# Patient Record
Sex: FEMALE | Race: WHITE | NOT HISPANIC OR LATINO | Employment: FULL TIME | URBAN - NONMETROPOLITAN AREA
[De-identification: names, ages, dates, MRNs, and addresses within clinical notes are randomized per-mention and may not be internally consistent; named-entity substitution may affect disease eponyms.]

---

## 2017-01-01 ENCOUNTER — HOSPITAL ENCOUNTER (EMERGENCY)
Facility: HOSPITAL | Age: 22
Discharge: HOME | End: 2017-01-01
Attending: EMERGENCY MEDICINE | Admitting: EMERGENCY MEDICINE

## 2017-01-01 VITALS
OXYGEN SATURATION: 99 % | RESPIRATION RATE: 16 BRPM | SYSTOLIC BLOOD PRESSURE: 117 MMHG | WEIGHT: 167 LBS | HEIGHT: 65 IN | TEMPERATURE: 97.8 F | DIASTOLIC BLOOD PRESSURE: 77 MMHG | HEART RATE: 91 BPM | BODY MASS INDEX: 27.82 KG/M2

## 2017-01-01 DIAGNOSIS — IMO0002 UNCONTROLLED TYPE 1 DIABETES MELLITUS WITH COMPLICATION: ICD-10-CM

## 2017-01-01 DIAGNOSIS — E16.2 HYPOGLYCEMIA: Primary | ICD-10-CM

## 2017-01-01 LAB
ALBUMIN SERPL-MCNC: 5.1 G/DL (ref 3.5–5)
ALBUMIN/GLOB SERPL: 1.3 G/DL (ref 1.5–2.5)
ALP SERPL-CCNC: 186 U/L (ref 46–116)
ALT SERPL W P-5'-P-CCNC: 28 U/L (ref 10–36)
ANION GAP SERPL CALCULATED.3IONS-SCNC: 18.9 MMOL/L (ref 3.6–11.2)
AST SERPL-CCNC: 33 U/L (ref 10–30)
B-HCG UR QL: NEGATIVE
BACTERIA UR QL AUTO: ABNORMAL /HPF
BASOPHILS # BLD AUTO: 0.02 10*3/MM3 (ref 0–0.3)
BASOPHILS NFR BLD AUTO: 0.2 % (ref 0–2)
BILIRUB SERPL-MCNC: 0.5 MG/DL (ref 0.2–1.8)
BILIRUB UR QL STRIP: NEGATIVE
BUN BLD-MCNC: 15 MG/DL (ref 7–21)
BUN/CREAT SERPL: 18.8 (ref 7–25)
CALCIUM SPEC-SCNC: 10.5 MG/DL (ref 7.7–10)
CHLORIDE SERPL-SCNC: 105 MMOL/L (ref 99–112)
CLARITY UR: ABNORMAL
CO2 SERPL-SCNC: 14.1 MMOL/L (ref 24.3–31.9)
COLOR UR: YELLOW
CREAT BLD-MCNC: 0.8 MG/DL (ref 0.43–1.29)
DEPRECATED RDW RBC AUTO: 47.9 FL (ref 37–54)
EOSINOPHIL # BLD AUTO: 0 10*3/MM3 (ref 0–0.7)
EOSINOPHIL NFR BLD AUTO: 0 % (ref 0–5)
ERYTHROCYTE [DISTWIDTH] IN BLOOD BY AUTOMATED COUNT: 15.8 % (ref 11.5–14.5)
GFR SERPL CREATININE-BSD FRML MDRD: 92 ML/MIN/1.73
GLOBULIN UR ELPH-MCNC: 3.9 GM/DL
GLUCOSE BLD-MCNC: 46 MG/DL (ref 70–110)
GLUCOSE BLDC GLUCOMTR-MCNC: 172 MG/DL (ref 70–130)
GLUCOSE BLDC GLUCOMTR-MCNC: 307 MG/DL (ref 70–130)
GLUCOSE BLDC GLUCOMTR-MCNC: 33 MG/DL (ref 70–130)
GLUCOSE BLDC GLUCOMTR-MCNC: 37 MG/DL (ref 70–130)
GLUCOSE BLDC GLUCOMTR-MCNC: 38 MG/DL (ref 70–130)
GLUCOSE UR STRIP-MCNC: ABNORMAL MG/DL
HCT VFR BLD AUTO: 44.9 % (ref 37–47)
HGB BLD-MCNC: 14.2 G/DL (ref 12–16)
HGB UR QL STRIP.AUTO: ABNORMAL
HYALINE CASTS UR QL AUTO: ABNORMAL /LPF
IMM GRANULOCYTES # BLD: 0.03 10*3/MM3 (ref 0–0.03)
IMM GRANULOCYTES NFR BLD: 0.4 % (ref 0–0.5)
KETONES UR QL STRIP: ABNORMAL
LEUKOCYTE ESTERASE UR QL STRIP.AUTO: ABNORMAL
LYMPHOCYTES # BLD AUTO: 1.62 10*3/MM3 (ref 1–3)
LYMPHOCYTES NFR BLD AUTO: 19.6 % (ref 21–51)
MCH RBC QN AUTO: 26.6 PG (ref 27–33)
MCHC RBC AUTO-ENTMCNC: 31.6 G/DL (ref 33–37)
MCV RBC AUTO: 84.1 FL (ref 80–94)
MONOCYTES # BLD AUTO: 0.9 10*3/MM3 (ref 0.1–0.9)
MONOCYTES NFR BLD AUTO: 10.9 % (ref 0–10)
NEUTROPHILS # BLD AUTO: 5.7 10*3/MM3 (ref 1.4–6.5)
NEUTROPHILS NFR BLD AUTO: 68.9 % (ref 30–70)
NITRITE UR QL STRIP: NEGATIVE
OSMOLALITY SERPL CALC.SUM OF ELEC: 273.6 MOSM/KG (ref 273–305)
PH UR STRIP.AUTO: <=5 [PH] (ref 5–8)
PLATELET # BLD AUTO: 327 10*3/MM3 (ref 130–400)
PMV BLD AUTO: 12.6 FL (ref 6–10)
POTASSIUM BLD-SCNC: 4 MMOL/L (ref 3.5–5.3)
PROT SERPL-MCNC: 9 G/DL (ref 6–8)
PROT UR QL STRIP: ABNORMAL
RBC # BLD AUTO: 5.34 10*6/MM3 (ref 4.2–5.4)
RBC # UR: ABNORMAL /HPF
REF LAB TEST METHOD: ABNORMAL
SODIUM BLD-SCNC: 138 MMOL/L (ref 135–153)
SP GR UR STRIP: 1.02 (ref 1–1.03)
SQUAMOUS #/AREA URNS HPF: ABNORMAL /HPF
UROBILINOGEN UR QL STRIP: ABNORMAL
WBC NRBC COR # BLD: 8.27 10*3/MM3 (ref 4.5–12.5)
WBC UR QL AUTO: ABNORMAL /HPF

## 2017-01-01 PROCEDURE — 25010000002 ONDANSETRON PER 1 MG: Performed by: PHYSICIAN ASSISTANT

## 2017-01-01 PROCEDURE — 85025 COMPLETE CBC W/AUTO DIFF WBC: CPT | Performed by: PHYSICIAN ASSISTANT

## 2017-01-01 PROCEDURE — 96375 TX/PRO/DX INJ NEW DRUG ADDON: CPT

## 2017-01-01 PROCEDURE — 80053 COMPREHEN METABOLIC PANEL: CPT | Performed by: PHYSICIAN ASSISTANT

## 2017-01-01 PROCEDURE — 99283 EMERGENCY DEPT VISIT LOW MDM: CPT

## 2017-01-01 PROCEDURE — 96361 HYDRATE IV INFUSION ADD-ON: CPT

## 2017-01-01 PROCEDURE — 82962 GLUCOSE BLOOD TEST: CPT

## 2017-01-01 PROCEDURE — 81025 URINE PREGNANCY TEST: CPT | Performed by: PHYSICIAN ASSISTANT

## 2017-01-01 PROCEDURE — 81001 URINALYSIS AUTO W/SCOPE: CPT | Performed by: PHYSICIAN ASSISTANT

## 2017-01-01 PROCEDURE — 87086 URINE CULTURE/COLONY COUNT: CPT | Performed by: PHYSICIAN ASSISTANT

## 2017-01-01 PROCEDURE — 96374 THER/PROPH/DIAG INJ IV PUSH: CPT

## 2017-01-01 RX ORDER — DEXTROSE MONOHYDRATE 25 G/50ML
25 INJECTION, SOLUTION INTRAVENOUS ONCE
Status: COMPLETED | OUTPATIENT
Start: 2017-01-01 | End: 2017-01-01

## 2017-01-01 RX ORDER — ONDANSETRON 2 MG/ML
4 INJECTION INTRAMUSCULAR; INTRAVENOUS ONCE
Status: COMPLETED | OUTPATIENT
Start: 2017-01-01 | End: 2017-01-01

## 2017-01-01 RX ORDER — SODIUM CHLORIDE 0.9 % (FLUSH) 0.9 %
10 SYRINGE (ML) INJECTION AS NEEDED
Status: DISCONTINUED | OUTPATIENT
Start: 2017-01-01 | End: 2017-01-02 | Stop reason: HOSPADM

## 2017-01-01 RX ORDER — DEXTROSE MONOHYDRATE 25 G/50ML
INJECTION, SOLUTION INTRAVENOUS
Status: DISCONTINUED
Start: 2017-01-01 | End: 2017-01-02 | Stop reason: HOSPADM

## 2017-01-01 RX ORDER — INSULIN GLARGINE 100 [IU]/ML
INJECTION, SOLUTION SUBCUTANEOUS 2 TIMES DAILY
COMMUNITY
End: 2017-08-10 | Stop reason: DRUGHIGH

## 2017-01-01 RX ADMIN — SODIUM CHLORIDE 1000 ML: 9 INJECTION, SOLUTION INTRAVENOUS at 19:02

## 2017-01-01 RX ADMIN — ONDANSETRON 4 MG: 2 INJECTION, SOLUTION INTRAMUSCULAR; INTRAVENOUS at 19:03

## 2017-01-01 RX ADMIN — DEXTROSE MONOHYDRATE 25 G: 25 INJECTION, SOLUTION INTRAVENOUS at 19:31

## 2017-01-02 NOTE — ED PROVIDER NOTES
Subjective   Patient is a 21 y.o. female presenting with vomiting.   History provided by:  Patient  Vomiting   The primary symptoms include nausea and vomiting. Primary symptoms do not include fever, abdominal pain or dysuria. The illness began today. The onset was sudden. The problem has not changed since onset.  Associated medical issues comments: type I Diabetic / HX of Diabetic ketoacidosis. .       Review of Systems   Constitutional: Negative.  Negative for fever.   HENT: Negative.    Respiratory: Negative.    Cardiovascular: Negative.  Negative for chest pain.   Gastrointestinal: Positive for nausea and vomiting. Negative for abdominal pain.   Endocrine: Negative.    Genitourinary: Negative.  Negative for dysuria.   Skin: Negative.    Neurological: Negative.    Psychiatric/Behavioral: Negative.    All other systems reviewed and are negative.      Past Medical History   Diagnosis Date   • Diabetes mellitus 2000     Type 1   • Disease of thyroid gland      hypothyroidism   • Gestational hypertension    • Hypertension 2016     With pregnancy   • Hypertension      since 15yo   • Kidney failure 2016     R/T diabetes   • Polyhydramnios        No Known Allergies    Past Surgical History   Procedure Laterality Date   • Finger/thumb closed reduction Left 2004   • Finger/thumb arthroplasty Left      age 9 yr   • Finger/thumb closed reduction         Family History   Problem Relation Age of Onset   • Hypertension Father        Social History     Social History   • Marital status: Single     Spouse name: N/A   • Number of children: N/A   • Years of education: N/A     Social History Main Topics   • Smoking status: Never Smoker   • Smokeless tobacco: Never Used   • Alcohol use No   • Drug use: No   • Sexual activity: Yes     Partners: Male     Other Topics Concern   • None     Social History Narrative           Objective   Physical Exam   Constitutional: She is oriented to person, place, and time. She appears well-developed  and well-nourished. No distress.   HENT:   Head: Normocephalic and atraumatic.   Right Ear: External ear normal.   Left Ear: External ear normal.   Nose: Nose normal.   Eyes: Conjunctivae and EOM are normal. Pupils are equal, round, and reactive to light.   Neck: Normal range of motion. Neck supple. No JVD present. No tracheal deviation present.   Cardiovascular: Normal rate, regular rhythm and normal heart sounds.    No murmur heard.  Pulmonary/Chest: Effort normal and breath sounds normal. No respiratory distress. She has no wheezes.   Abdominal: Soft. Bowel sounds are normal. There is no tenderness.   Musculoskeletal: Normal range of motion. She exhibits no edema or deformity.   Neurological: She is alert and oriented to person, place, and time. No cranial nerve deficit.   Skin: Skin is warm and dry. No rash noted. She is not diaphoretic. No erythema. No pallor.   Psychiatric: She has a normal mood and affect. Her behavior is normal. Thought content normal.   Nursing note and vitals reviewed.      Procedures         ED Course  ED Course   Comment By Time   First finger stick reported at 38  Second finger stick reported at 33.  Pt has consumed 8oz of OJ.   JAVIER Fischer 01/01 1931   Glucose 172 at last reading.  JAVIER Fischer 01/01 2011   Last finger stick was over 300.  Pt has insulin in her possession and will tx w/ sliding scale.  JAVIER Fischer 01/02 0316                  MDM  Number of Diagnoses or Management Options  Hypoglycemia: established and improving  Uncontrolled type 1 diabetes mellitus with complication: established and improving     Amount and/or Complexity of Data Reviewed  Clinical lab tests: ordered and reviewed    Risk of Complications, Morbidity, and/or Mortality  Presenting problems: moderate  Diagnostic procedures: low  Management options: low    Patient Progress  Patient progress: stable      Final diagnoses:   Hypoglycemia   Uncontrolled type 1 diabetes mellitus with complication             JAVIER Fischer  01/02/17 0314

## 2017-01-04 LAB
BACTERIA SPEC AEROBE CULT: NORMAL
GLUCOSE BLDC GLUCOMTR-MCNC: 38 MG/DL (ref 70–130)

## 2017-07-17 ENCOUNTER — TRANSCRIBE ORDERS (OUTPATIENT)
Dept: WOMENS IMAGING | Facility: HOSPITAL | Age: 22
End: 2017-07-17

## 2017-07-17 DIAGNOSIS — IMO0001 IDDM (INSULIN DEPENDENT DIABETES MELLITUS): Primary | ICD-10-CM

## 2017-07-17 LAB
EXTERNAL ABO GROUPING: NORMAL
EXTERNAL ANTIBODY SCREEN: NEGATIVE
EXTERNAL CHLAMYDIA SCREEN: NORMAL
EXTERNAL GONORRHEA SCREEN: NORMAL
EXTERNAL HEMOGLOBIN: 12.9 G/DL
EXTERNAL HEPATITIS B SURFACE ANTIGEN: NEGATIVE
EXTERNAL RH FACTOR: NEGATIVE
EXTERNAL RUBELLA QUALITATIVE: NORMAL
EXTERNAL SYPHILIS RPR SCREEN: NORMAL
HIV1 P24 AG SERPL QL IA: NORMAL

## 2017-08-10 ENCOUNTER — HOSPITAL ENCOUNTER (OUTPATIENT)
Dept: WOMENS IMAGING | Facility: HOSPITAL | Age: 22
Discharge: HOME | End: 2017-08-10
Admitting: OBSTETRICS & GYNECOLOGY

## 2017-08-10 ENCOUNTER — OFFICE VISIT (OUTPATIENT)
Dept: OBSTETRICS AND GYNECOLOGY | Facility: HOSPITAL | Age: 22
End: 2017-08-10

## 2017-08-10 VITALS
BODY MASS INDEX: 31.22 KG/M2 | WEIGHT: 187.4 LBS | SYSTOLIC BLOOD PRESSURE: 132 MMHG | DIASTOLIC BLOOD PRESSURE: 84 MMHG | HEIGHT: 65 IN

## 2017-08-10 DIAGNOSIS — IMO0001 IDDM (INSULIN DEPENDENT DIABETES MELLITUS): ICD-10-CM

## 2017-08-10 DIAGNOSIS — O24.019 TYPE 1 DIABETES MELLITUS IN PREGNANCY, UNSPECIFIED TRIMESTER: Primary | ICD-10-CM

## 2017-08-10 DIAGNOSIS — Z71.89 DIABETES EDUCATION, ENCOUNTER FOR: ICD-10-CM

## 2017-08-10 LAB
BILIRUB BLD-MCNC: NEGATIVE MG/DL
CLARITY, POC: CLEAR
COLOR UR: YELLOW
GLUCOSE UR STRIP-MCNC: ABNORMAL MG/DL
KETONES UR QL: ABNORMAL
LEUKOCYTE EST, POC: NEGATIVE
NITRITE UR-MCNC: NEGATIVE MG/ML
PH UR: 6 [PH] (ref 5–8)
PROT UR STRIP-MCNC: NEGATIVE MG/DL
RBC # UR STRIP: NEGATIVE /UL
SP GR UR: 1.02 (ref 1–1.03)
UROBILINOGEN UR QL: NORMAL

## 2017-08-10 PROCEDURE — 99241 PR OFFICE CONSULTATION NEW/ESTAB PATIENT 15 MIN: CPT | Performed by: OBSTETRICS & GYNECOLOGY

## 2017-08-10 PROCEDURE — 76811 OB US DETAILED SNGL FETUS: CPT

## 2017-08-10 PROCEDURE — 93325 DOPPLER ECHO COLOR FLOW MAPG: CPT

## 2017-08-10 PROCEDURE — 76825 ECHO EXAM OF FETAL HEART: CPT

## 2017-08-10 PROCEDURE — 76811 OB US DETAILED SNGL FETUS: CPT | Performed by: OBSTETRICS & GYNECOLOGY

## 2017-08-10 PROCEDURE — 93325 DOPPLER ECHO COLOR FLOW MAPG: CPT | Performed by: OBSTETRICS & GYNECOLOGY

## 2017-08-10 PROCEDURE — 76825 ECHO EXAM OF FETAL HEART: CPT | Performed by: OBSTETRICS & GYNECOLOGY

## 2017-08-10 RX ORDER — ASPIRIN 81 MG/1
81 TABLET ORAL DAILY
COMMUNITY
End: 2017-12-19 | Stop reason: HOSPADM

## 2017-08-10 RX ORDER — INSULIN GLARGINE 100 [IU]/ML
52 INJECTION, SOLUTION SUBCUTANEOUS DAILY
COMMUNITY
End: 2017-09-29 | Stop reason: CLARIF

## 2017-08-10 NOTE — PROGRESS NOTES
Denies problems. [facility] has not been offered genetic screening. Her PCP (APRN) manages T1DM when she is not pregnant but has not seen her as scheduled and only provides glucose values every 6 months. [facility] no one has been following glucose values during pregnancy but Dr. Rodriguez wants [facility] to manage. Admits only does fasting glucose values, which have been 90's-440's. Has not done a 24 hour urine in this pregnancy. Takes Lantus each morning and Humalog 1u/7gm carb ratio with each meal and snack. Denies any recent diabetes education. Had GHTN in previous pregnancy. Denies any history of CHTN or renal failure; both were previously documented in patient hx.  Patient was admitted at 33w 6d in the third epidsode of DKA during that pregnancy and IUFD was dx; was transferred to [facility] and delivered.

## 2017-08-10 NOTE — PROGRESS NOTES
Documentation of the ultrasound findings, images, and interpretations as well as consultation notes will be available in the patient's ViewPoint report located in the chart review imaging tab in [facility].

## 2017-08-14 ENCOUNTER — TELEPHONE (OUTPATIENT)
Dept: INTERNAL MEDICINE | Facility: CLINIC | Age: 22
End: 2017-08-14

## 2017-08-17 ENCOUNTER — TELEPHONE (OUTPATIENT)
Dept: WOMENS IMAGING | Facility: HOSPITAL | Age: 22
End: 2017-08-17

## 2017-08-17 NOTE — TELEPHONE ENCOUNTER
Patient did not have preeclampsia panel drawn as ordered on 8/10/2017. Also has not yet scheduled follow-up appointment here. Left voice message for patient to call [facility].

## 2017-08-22 ENCOUNTER — OFFICE VISIT (OUTPATIENT)
Dept: ENDOCRINOLOGY | Facility: CLINIC | Age: 22
End: 2017-08-22

## 2017-08-22 VITALS
WEIGHT: 185 LBS | OXYGEN SATURATION: 100 % | BODY MASS INDEX: 30.82 KG/M2 | DIASTOLIC BLOOD PRESSURE: 70 MMHG | HEIGHT: 65 IN | SYSTOLIC BLOOD PRESSURE: 124 MMHG | HEART RATE: 93 BPM

## 2017-08-22 DIAGNOSIS — I10 ESSENTIAL HYPERTENSION: ICD-10-CM

## 2017-08-22 DIAGNOSIS — IMO0001 IDDM (INSULIN DEPENDENT DIABETES MELLITUS): Primary | ICD-10-CM

## 2017-08-22 LAB
ALBUMIN SERPL-MCNC: 3.8 G/DL (ref 3.2–4.8)
ALBUMIN/GLOB SERPL: 1.4 G/DL (ref 1.5–2.5)
ALP SERPL-CCNC: 91 U/L (ref 25–100)
ALT SERPL W P-5'-P-CCNC: 10 U/L (ref 7–40)
ANION GAP SERPL CALCULATED.3IONS-SCNC: 14 MMOL/L (ref 3–11)
AST SERPL-CCNC: 13 U/L (ref 0–33)
BILIRUB SERPL-MCNC: 0.3 MG/DL (ref 0.3–1.2)
BUN BLD-MCNC: 14 MG/DL (ref 9–23)
BUN/CREAT SERPL: 23.3 (ref 7–25)
CALCIUM SPEC-SCNC: 9.2 MG/DL (ref 8.7–10.4)
CHLORIDE SERPL-SCNC: 105 MMOL/L (ref 99–109)
CO2 SERPL-SCNC: 17 MMOL/L (ref 20–31)
CREAT BLD-MCNC: 0.6 MG/DL (ref 0.6–1.3)
GFR SERPL CREATININE-BSD FRML MDRD: 127 ML/MIN/1.73
GLOBULIN UR ELPH-MCNC: 2.8 GM/DL
GLUCOSE BLD-MCNC: 215 MG/DL (ref 70–100)
GLUCOSE BLDC GLUCOMTR-MCNC: 274 MG/DL (ref 70–130)
HBA1C MFR BLD: 9.6 %
POTASSIUM BLD-SCNC: 4 MMOL/L (ref 3.5–5.5)
PROT SERPL-MCNC: 6.6 G/DL (ref 5.7–8.2)
SODIUM BLD-SCNC: 136 MMOL/L (ref 132–146)
T4 FREE SERPL-MCNC: 1.2 NG/DL (ref 0.89–1.76)
TSH SERPL DL<=0.05 MIU/L-ACNC: 3.64 MIU/ML (ref 0.35–5.35)

## 2017-08-22 PROCEDURE — 82043 UR ALBUMIN QUANTITATIVE: CPT | Performed by: INTERNAL MEDICINE

## 2017-08-22 PROCEDURE — 84439 ASSAY OF FREE THYROXINE: CPT | Performed by: INTERNAL MEDICINE

## 2017-08-22 PROCEDURE — 99244 OFF/OP CNSLTJ NEW/EST MOD 40: CPT | Performed by: INTERNAL MEDICINE

## 2017-08-22 PROCEDURE — 82947 ASSAY GLUCOSE BLOOD QUANT: CPT | Performed by: INTERNAL MEDICINE

## 2017-08-22 PROCEDURE — 80053 COMPREHEN METABOLIC PANEL: CPT | Performed by: INTERNAL MEDICINE

## 2017-08-22 PROCEDURE — 84443 ASSAY THYROID STIM HORMONE: CPT | Performed by: INTERNAL MEDICINE

## 2017-08-22 PROCEDURE — 83036 HEMOGLOBIN GLYCOSYLATED A1C: CPT | Performed by: INTERNAL MEDICINE

## 2017-08-22 PROCEDURE — 82570 ASSAY OF URINE CREATININE: CPT | Performed by: INTERNAL MEDICINE

## 2017-08-22 RX ORDER — INSULIN LISPRO 100 [IU]/ML
20 INJECTION, SOLUTION INTRAVENOUS; SUBCUTANEOUS
Qty: 10 PEN | Refills: 1 | Status: SHIPPED | OUTPATIENT
Start: 2017-08-22 | End: 2017-09-29 | Stop reason: CLARIF

## 2017-08-22 RX ORDER — LANCETS 33 GAUGE
EACH MISCELLANEOUS
Qty: 200 EACH | Refills: 5 | Status: ON HOLD | OUTPATIENT
Start: 2017-08-22 | End: 2017-10-12

## 2017-08-22 RX ORDER — CHLORPHENIR/PHENYLEPH/ASPIRIN 2-7.8-325
1 TABLET, EFFERVESCENT ORAL DAILY
Qty: 50 STRIP | Refills: 3 | Status: ON HOLD | OUTPATIENT
Start: 2017-08-22 | End: 2017-10-12

## 2017-08-22 RX ORDER — BLOOD-GLUCOSE METER
1 KIT MISCELLANEOUS DAILY
Qty: 1 EACH | Refills: 0 | Status: ON HOLD | OUTPATIENT
Start: 2017-08-22 | End: 2017-10-12

## 2017-08-22 NOTE — ASSESSMENT & PLAN NOTE
Blood sugar and 90 day average sugar reviewed  Results for orders placed or performed in visit on 08/22/17   POC Glycosylated Hemoglobin (Hb A1C)   Result Value Ref Range    Hemoglobin A1C 9.6 %   POC Glucose Fingerstick   Result Value Ref Range    Glucose 274 (A) 70 - 130 mg/dL     Goal average sugar 6.5% or less discussed  She will start testing sugars ac and hs, some pp sugars  Consider glucose sensor  Update eye exam   Check ur alb   F/u 4-6 weeks with weekly email   Adjustment based on blood sugars  No changes were made today as I am hopeful that with testing, adjustment and changes in diet sugars will improve   rx for pens sent as patient is working a lot, has no where to store insulin   Also having problems with manual dexterity due to the pregnancy and needs pen for management and ability to continue to self administer insulin at this time

## 2017-08-22 NOTE — PROGRESS NOTES
Emily Bagley 22 y.o.  [facility]:Establish Care (New patient being seen at the request of Dr Whitaker for a consultation regarding Type I Diabetes with pregnancy, GAVINO 1-)    [place]: Establish Care (New patient being seen at the request of Dr Whitaker for a consultation regarding Type I Diabetes with pregnancy, GAVINO 1-)    Blood sugar and 90 day average sugar reviewed  Results for orders placed or performed in visit on 08/22/17   POC Glycosylated Hemoglobin (Hb A1C)   Result Value Ref Range    Hemoglobin A1C 9.6 %   POC Glucose Fingerstick   Result Value Ref Range    Glucose 274 (A) 70 - 130 mg/dL     Average sugar is 225   Last check 10/16 was 8.5%  Diagnosed diabetes since age 5 and 1/2  Is currently 20 weeks pregnant  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)  lantus 52 units in the morning  Also using humalog 1 unit per 7 carbs   At times insulin working and at times sugar high after meals  Has talked to Dr Bazan in past about pumps   Most recent IDDM management via PCP   Discussed goals for fasting sugar less than 100 and goal for pp sugar less than 140  Rationale for goals discussed along with risk to baby (assoc with higher sugars) including macrosomia, IUFD  Discussed carb counts recommended with meals  Advised best results with standardization of meals and sticking with consistent meals which have known carb counts   Insulin to carb ratio and sensitivity reviewed  1:7:20 with 5 u per 100 over goal (goal 100) discussed  Also discussed long term management with pump and options reviewed, information provided   bp is good     No Known Allergies    Current Outpatient Prescriptions:   •  aspirin 81 MG EC tablet, Take 81 mg by mouth Daily., Disp: , Rfl:   •  insulin glargine (LANTUS) 100 UNIT/ML injection, Inject 52 Units under the skin Daily. Takes each morning, Disp: , Rfl:   •  insulin lispro (HumaLOG) 100 UNIT/ML injection, Inject 0-14 Units under the skin 4 (Four) Times a Day With Meals & at Bedtime. (Patient taking  differently: Inject 0-14 Units under the skin 4 (Four) Times a Day With Meals & at Bedtime. 1u/7gm carb ratio; takes with each meal and snack; also does correction scale - Inject 7-20 units TID with meals), Disp: 2 mL, Rfl: 12  •  Syringe, Disposable, 1 ML misc, 1 mL 4 (Four) Times a Day Before Meals & at Bedtime., Disp: 100 each, Rfl: 1  Patient Active Problem List    Diagnosis   • IDDM (insulin dependent diabetes mellitus) [E11.9, Z79.4]   • Endometritis [N71.9]   • PIH (pregnancy induced hypertension) [O13.9]   • DKA (diabetic ketoacidoses) [E13.10]   • Intrauterine fetal death in pregnancy, s/p induction, spontaneous vaginal delivery 10/9/16, Laborist Dr Juan [O36.4XX0]   • Lactic acidosis [E87.2]   • N&V (nausea and vomiting) [R11.2]   • Hypothyroid [E03.9]   • Pre-existing type 1 diabetes mellitus during pregnancy in third trimester [O24.013]   • Polyhydramnios [O40.9XX0]   • Chronic hypertension in pregnancy [O10.919]   • Hypertension [I10]     Overview Note:     With pregnancy     • Diabetes education, encounter for [Z71.89]     Overview Note:     Type 1       Review of Systems   Constitutional: Positive for fatigue and unexpected weight change. Negative for activity change, appetite change, chills, diaphoresis and fever.   HENT: Negative for congestion, dental problem, drooling, ear discharge, ear pain, facial swelling, hearing loss, mouth sores, nosebleeds, postnasal drip, rhinorrhea, sinus pressure, sneezing, sore throat, tinnitus, trouble swallowing and voice change.    Eyes: Negative for photophobia, pain, discharge, redness, itching and visual disturbance.   Respiratory: Negative for apnea, cough, choking, chest tightness, shortness of breath, wheezing and stridor.    Cardiovascular: Positive for palpitations. Negative for chest pain and leg swelling.   Gastrointestinal: Positive for nausea. Negative for abdominal distention, abdominal pain, anal bleeding, blood in stool, constipation, diarrhea,  "rectal pain and vomiting.   Endocrine: Positive for polydipsia. Negative for cold intolerance, heat intolerance, polyphagia and polyuria.   Genitourinary: Negative for decreased urine volume, difficulty urinating, dysuria, enuresis, flank pain, frequency, genital sores, hematuria and urgency.        Pregnant LMP 4/4/17   Musculoskeletal: Negative for arthralgias, back pain, gait problem, joint swelling, myalgias, neck pain and neck stiffness.   Skin: Negative for color change, pallor, rash and wound.   Allergic/Immunologic: Negative for environmental allergies, food allergies and immunocompromised state.   Neurological: Negative for dizziness, tremors, seizures, syncope, facial asymmetry, speech difficulty, weakness, light-headedness, numbness and headaches.   Hematological: Negative for adenopathy. Does not bruise/bleed easily.   Psychiatric/Behavioral: Negative for agitation, behavioral problems, confusion, decreased concentration, dysphoric mood, hallucinations, self-injury, sleep disturbance and suicidal ideas. The patient is not nervous/anxious and is not hyperactive.      Social History     Social History   • Marital status: Single     Spouse name: N/A   • Number of children: N/A   • Years of education: N/A     Occupational History   • Not on file.     Social History Main Topics   • Smoking status: Never Smoker   • Smokeless tobacco: Never Used   • Alcohol use No   • Drug use: No   • Sexual activity: Yes     Partners: Male     Other Topics Concern   • Not on file     Social History Narrative     Family History   Problem Relation Age of Onset   • Hypertension Father      /70  Pulse 93  Ht 65\" (165.1 cm)  Wt 185 lb (83.9 kg)  LMP 04/04/2017  SpO2 100%  BMI 30.79 kg/m2  Physical Exam   Constitutional: She is oriented to person, place, and time. She appears well-developed and well-nourished.   HENT:   Head: Normocephalic and atraumatic.   Nose: Nose normal.   Mouth/Throat: Oropharynx is clear and " moist.   Eyes: Conjunctivae, EOM and lids are normal. Pupils are equal, round, and reactive to light.   Neck: Trachea normal and normal range of motion. Neck supple. Carotid bruit is not present. No tracheal deviation present. No thyroid mass and no thyromegaly present.   Cardiovascular: Normal rate, regular rhythm, normal heart sounds and intact distal pulses.  Exam reveals no gallop and no friction rub.    No murmur heard.  Pulmonary/Chest: Effort normal and breath sounds normal. No respiratory distress. She has no wheezes.   Musculoskeletal: Normal range of motion. She exhibits no edema or deformity.    Emily had a diabetic foot exam performed today.   During the foot exam she had a monofilament test performed.    Neurological Sensory Findings - Unaltered hot/cold right ankle/foot discrimination and unaltered hot/cold left ankle/foot discrimination. Unaltered sharp/dull right ankle/foot discrimination and unaltered sharp/dull left ankle/foot discrimination. No right ankle/foot altered proprioception and no left ankle/foot altered proprioception    Vascular Status -  Her exam exhibits right foot vasculature normal. Her exam exhibits no right foot edema. Her exam exhibits left foot vasculature normal. Her exam exhibits no left foot edema.   Skin Integrity  -  Her right foot skin is intact.     Emily 's left foot skin is intact. .  Lymphadenopathy:     She has no cervical adenopathy.   Neurological: She is alert and oriented to person, place, and time. She has normal reflexes. She displays normal reflexes. No cranial nerve deficit.   Skin: Skin is warm and dry. No rash noted. No cyanosis or erythema. Nails show no clubbing.   Psychiatric: She has a normal mood and affect. Her speech is normal and behavior is normal. Judgment and thought content normal. Cognition and memory are normal.   Nursing note and vitals reviewed.    Results for orders placed or performed in visit on 08/10/17   POC Urinalysis Dipstick    Result Value Ref Range    Color Yellow Yellow, Straw, Dark Yellow, Seda    Clarity, UA Clear Clear    Glucose, UA 2000 mg/dL (A) Negative, 1000 mg/dL (3+) mg/dL    Bilirubin Negative Negative    Ketones, UA 15 mg/dL (A) Negative    Specific Gravity  1.020 1.005 - 1.030    Blood, UA Negative Negative    pH, Urine 6.0 5.0 - 8.0    Protein, POC Negative Negative mg/dL    Urobilinogen, UA Normal Normal    Leukocytes Negative Negative    Nitrite, UA Negative Negative     Emily was seen today for establish care.    Diagnoses and all orders for this visit:    IDDM (insulin dependent diabetes mellitus)  -     POC Glycosylated Hemoglobin (Hb A1C)  -     POC Glucose Fingerstick      Problem List Items Addressed This Visit        Cardiovascular and Mediastinum    Hypertension     bp is currently well controlled          Relevant Orders    TSH       Endocrine    IDDM (insulin dependent diabetes mellitus) - Primary     Blood sugar and 90 day average sugar reviewed  Results for orders placed or performed in visit on 08/22/17   POC Glycosylated Hemoglobin (Hb A1C)   Result Value Ref Range    Hemoglobin A1C 9.6 %   POC Glucose Fingerstick   Result Value Ref Range    Glucose 274 (A) 70 - 130 mg/dL     Goal average sugar 6.5% or less discussed  She will start testing sugars ac and hs, some pp sugars  Consider glucose sensor  Update eye exam   Check ur alb   F/u 4-6 weeks with weekly email   Adjustment based on blood sugars  No changes were made today as I am hopeful that with testing, adjustment and changes in diet sugars will improve   rx for pens sent as patient is working a lot, has no where to store insulin   Also having problems with manual dexterity due to the pregnancy and needs pen for management and ability to continue to self administer insulin at this time         Relevant Medications    glucagon (GLUCAGEN) 1 MG injection    Insulin Glargine (LANTUS SOLOSTAR) 100 UNIT/ML injection pen    HUMALOG KWIKPEN 100 UNIT/ML  solution pen-injector    Other Relevant Orders    POC Glycosylated Hemoglobin (Hb A1C) (Completed)    POC Glucose Fingerstick (Completed)    Microalbumin / Creatinine Urine Ratio    T4, Free    Comprehensive Metabolic Panel        Return in about 4 weeks (around 9/19/2017) for Recheck 30 min .    Crystal Reynolds MA  Signed Ne Kerr MD

## 2017-08-23 LAB
CREAT 24H UR-MCNC: 61.8 MG/DL
MICROALB/CRT. RATIO UR: 12.9 MG/G CREAT (ref 0–30)
MICROALBUMIN UR-MCNC: 8 UG/ML

## 2017-09-22 ENCOUNTER — TELEPHONE (OUTPATIENT)
Dept: INTERNAL MEDICINE | Facility: CLINIC | Age: 22
End: 2017-09-22

## 2017-09-22 NOTE — TELEPHONE ENCOUNTER
I called [facility] and spoke with John.  He states they did not receive the prescriptions that were eprescribed on 8-22-17  All prescriptions were given to him verbally.  I left a message for pt that all prescriptions were called to John and for her to call here if any questions

## 2017-09-22 NOTE — TELEPHONE ENCOUNTER
PATIENT TRY TO  RX FOR THE FOLLOWING RX'S FROM  [facility] AND WOULD NOT LET HER HAVE THEM.  CAN YOU PLEASE ADVISE   [facility]  [facility]    [phone] [facility]

## 2017-09-22 NOTE — TELEPHONE ENCOUNTER
PATIENTS HUMLOG MEDICATION REQUIRES PRIOR AUTH. PHARMACY NEEDS A PRIOR AUTH FOR THIS MEDICATION. PATIENTS INSURANCE IS NOT COVERING LANTUS SOLOSTAR BUT THEY WILL COVER BASAGLAR. PHARMACY NEEDS TO KNOW WHAT DR. HECK WANTS TO DO ABOUT THESE MEDICATIONS.

## 2017-09-25 NOTE — TELEPHONE ENCOUNTER
rx sent for Levemir (to replace Lantus) and novolog (to replace humalog) due to insurance requiring a PA on Lantus and Humalog

## 2017-09-29 ENCOUNTER — OFFICE VISIT (OUTPATIENT)
Dept: ENDOCRINOLOGY | Facility: CLINIC | Age: 22
End: 2017-09-29

## 2017-09-29 VITALS
DIASTOLIC BLOOD PRESSURE: 64 MMHG | SYSTOLIC BLOOD PRESSURE: 112 MMHG | BODY MASS INDEX: 31.02 KG/M2 | HEIGHT: 66 IN | OXYGEN SATURATION: 99 % | WEIGHT: 193 LBS | HEART RATE: 98 BPM

## 2017-09-29 DIAGNOSIS — IMO0001 IDDM (INSULIN DEPENDENT DIABETES MELLITUS): Primary | ICD-10-CM

## 2017-09-29 LAB — HBA1C MFR BLD: 10 %

## 2017-09-29 PROCEDURE — 99213 OFFICE O/P EST LOW 20 MIN: CPT | Performed by: INTERNAL MEDICINE

## 2017-09-29 PROCEDURE — 82947 ASSAY GLUCOSE BLOOD QUANT: CPT | Performed by: INTERNAL MEDICINE

## 2017-09-29 PROCEDURE — 83036 HEMOGLOBIN GLYCOSYLATED A1C: CPT | Performed by: INTERNAL MEDICINE

## 2017-10-02 LAB — GLUCOSE BLDC GLUCOMTR-MCNC: 232 MG/DL (ref 70–130)

## 2017-10-03 RX ORDER — SYRINGE AND NEEDLE,INSULIN,1ML 28GX1/2"
SYRINGE, EMPTY DISPOSABLE MISCELLANEOUS
Refills: 1 | OUTPATIENT
Start: 2017-10-03

## 2017-10-11 ENCOUNTER — HOSPITAL ENCOUNTER (EMERGENCY)
Facility: HOSPITAL | Age: 22
Discharge: HOME | End: 2017-10-11
Attending: EMERGENCY MEDICINE | Admitting: EMERGENCY MEDICINE

## 2017-10-11 ENCOUNTER — HOSPITAL ENCOUNTER (OUTPATIENT)
Facility: HOSPITAL | Age: 22
Setting detail: OBSERVATION
Discharge: HOME | End: 2017-10-12
Attending: OBSTETRICS & GYNECOLOGY | Admitting: OBSTETRICS & GYNECOLOGY

## 2017-10-11 VITALS
OXYGEN SATURATION: 100 % | TEMPERATURE: 97.9 F | WEIGHT: 190 LBS | SYSTOLIC BLOOD PRESSURE: 119 MMHG | HEART RATE: 105 BPM | DIASTOLIC BLOOD PRESSURE: 79 MMHG | HEIGHT: 66 IN | BODY MASS INDEX: 30.53 KG/M2 | RESPIRATION RATE: 18 BRPM

## 2017-10-11 DIAGNOSIS — IMO0001 IDDM (INSULIN DEPENDENT DIABETES MELLITUS): Primary | ICD-10-CM

## 2017-10-11 PROBLEM — Z34.90 PREGNANCY: Status: ACTIVE | Noted: 2017-10-11

## 2017-10-11 LAB
ALBUMIN SERPL-MCNC: 4.5 G/DL (ref 3.5–5)
ALBUMIN/GLOB SERPL: 1.2 G/DL (ref 1.5–2.5)
ALP SERPL-CCNC: 95 U/L (ref 35–104)
ALT SERPL W P-5'-P-CCNC: 20 U/L (ref 10–36)
ANION GAP SERPL CALCULATED.3IONS-SCNC: 17.7 MMOL/L (ref 3.6–11.2)
AST SERPL-CCNC: 29 U/L (ref 10–30)
BASOPHILS # BLD AUTO: 0.03 10*3/MM3 (ref 0–0.3)
BASOPHILS NFR BLD AUTO: 0.3 % (ref 0–2)
BILIRUB SERPL-MCNC: 0.4 MG/DL (ref 0.2–1.8)
BILIRUB UR QL STRIP: NEGATIVE
BUN BLD-MCNC: 13 MG/DL (ref 7–21)
BUN/CREAT SERPL: 13.8 (ref 7–25)
CALCIUM SPEC-SCNC: 9.5 MG/DL (ref 7.7–10)
CHLORIDE SERPL-SCNC: 104 MMOL/L (ref 99–112)
CLARITY UR: CLEAR
CO2 SERPL-SCNC: 10.3 MMOL/L (ref 24.3–31.9)
COLOR UR: YELLOW
CREAT BLD-MCNC: 0.94 MG/DL (ref 0.43–1.29)
DEPRECATED RDW RBC AUTO: 42.5 FL (ref 37–54)
EOSINOPHIL # BLD AUTO: 0.01 10*3/MM3 (ref 0–0.7)
EOSINOPHIL NFR BLD AUTO: 0.1 % (ref 0–5)
ERYTHROCYTE [DISTWIDTH] IN BLOOD BY AUTOMATED COUNT: 13.6 % (ref 11.5–14.5)
GFR SERPL CREATININE-BSD FRML MDRD: 76 ML/MIN/1.73
GLOBULIN UR ELPH-MCNC: 3.9 GM/DL
GLUCOSE BLD-MCNC: 300 MG/DL (ref 70–110)
GLUCOSE BLDC GLUCOMTR-MCNC: 108 MG/DL (ref 70–130)
GLUCOSE BLDC GLUCOMTR-MCNC: 267 MG/DL (ref 70–130)
GLUCOSE BLDC GLUCOMTR-MCNC: 310 MG/DL (ref 70–130)
GLUCOSE BLDC GLUCOMTR-MCNC: 57 MG/DL (ref 70–130)
GLUCOSE UR STRIP-MCNC: ABNORMAL MG/DL
HCT VFR BLD AUTO: 42 % (ref 37–47)
HGB BLD-MCNC: 13.9 G/DL (ref 12–16)
HGB UR QL STRIP.AUTO: NEGATIVE
IMM GRANULOCYTES # BLD: 0.11 10*3/MM3 (ref 0–0.03)
IMM GRANULOCYTES NFR BLD: 0.9 % (ref 0–0.5)
KETONES UR QL STRIP: ABNORMAL
LEUKOCYTE ESTERASE UR QL STRIP.AUTO: NEGATIVE
LYMPHOCYTES # BLD AUTO: 1.14 10*3/MM3 (ref 1–3)
LYMPHOCYTES NFR BLD AUTO: 9.8 % (ref 21–51)
MCH RBC QN AUTO: 28.7 PG (ref 27–33)
MCHC RBC AUTO-ENTMCNC: 33.1 G/DL (ref 33–37)
MCV RBC AUTO: 86.6 FL (ref 80–94)
MONOCYTES # BLD AUTO: 0.54 10*3/MM3 (ref 0.1–0.9)
MONOCYTES NFR BLD AUTO: 4.6 % (ref 0–10)
NEUTROPHILS # BLD AUTO: 9.82 10*3/MM3 (ref 1.4–6.5)
NEUTROPHILS NFR BLD AUTO: 84.3 % (ref 30–70)
NITRITE UR QL STRIP: NEGATIVE
OSMOLALITY SERPL CALC.SUM OF ELEC: 275.8 MOSM/KG (ref 273–305)
PH UR STRIP.AUTO: <=5 [PH] (ref 5–8)
PLATELET # BLD AUTO: 243 10*3/MM3 (ref 130–400)
PMV BLD AUTO: 12 FL (ref 6–10)
POTASSIUM BLD-SCNC: 4.8 MMOL/L (ref 3.5–5.3)
PROT SERPL-MCNC: 8.4 G/DL (ref 6–8)
PROT UR QL STRIP: ABNORMAL
RBC # BLD AUTO: 4.85 10*6/MM3 (ref 4.2–5.4)
SODIUM BLD-SCNC: 132 MMOL/L (ref 135–153)
SP GR UR STRIP: >1.03 (ref 1–1.03)
UROBILINOGEN UR QL STRIP: ABNORMAL
WBC NRBC COR # BLD: 11.65 10*3/MM3 (ref 4.5–12.5)

## 2017-10-11 PROCEDURE — 82962 GLUCOSE BLOOD TEST: CPT

## 2017-10-11 PROCEDURE — 99284 EMERGENCY DEPT VISIT MOD MDM: CPT

## 2017-10-11 PROCEDURE — 85025 COMPLETE CBC W/AUTO DIFF WBC: CPT | Performed by: EMERGENCY MEDICINE

## 2017-10-11 PROCEDURE — 96361 HYDRATE IV INFUSION ADD-ON: CPT

## 2017-10-11 PROCEDURE — 80053 COMPREHEN METABOLIC PANEL: CPT | Performed by: EMERGENCY MEDICINE

## 2017-10-11 PROCEDURE — 63710000001 INSULIN REGULAR HUMAN PER 5 UNITS: Performed by: EMERGENCY MEDICINE

## 2017-10-11 PROCEDURE — 63710000001 INSULIN ASPART PER 5 UNITS: Performed by: OBSTETRICS & GYNECOLOGY

## 2017-10-11 PROCEDURE — 96375 TX/PRO/DX INJ NEW DRUG ADDON: CPT

## 2017-10-11 PROCEDURE — 81003 URINALYSIS AUTO W/O SCOPE: CPT | Performed by: EMERGENCY MEDICINE

## 2017-10-11 PROCEDURE — 96365 THER/PROPH/DIAG IV INF INIT: CPT

## 2017-10-11 PROCEDURE — 25010000002 PROMETHAZINE PER 50 MG: Performed by: EMERGENCY MEDICINE

## 2017-10-11 PROCEDURE — 59025 FETAL NON-STRESS TEST: CPT

## 2017-10-11 PROCEDURE — G0378 HOSPITAL OBSERVATION PER HR: HCPCS

## 2017-10-11 RX ORDER — DEXTROSE MONOHYDRATE 25 G/50ML
25 INJECTION, SOLUTION INTRAVENOUS
Status: DISCONTINUED | OUTPATIENT
Start: 2017-10-11 | End: 2017-10-12 | Stop reason: HOSPADM

## 2017-10-11 RX ORDER — SODIUM CHLORIDE 0.9 % (FLUSH) 0.9 %
10 SYRINGE (ML) INJECTION AS NEEDED
Status: DISCONTINUED | OUTPATIENT
Start: 2017-10-11 | End: 2017-10-11 | Stop reason: HOSPADM

## 2017-10-11 RX ORDER — DEXTROSE MONOHYDRATE 25 G/50ML
INJECTION, SOLUTION INTRAVENOUS
Status: DISCONTINUED
Start: 2017-10-11 | End: 2017-10-11 | Stop reason: HOSPADM

## 2017-10-11 RX ORDER — DEXTROSE MONOHYDRATE 25 G/50ML
12.5 INJECTION, SOLUTION INTRAVENOUS ONCE
Status: COMPLETED | OUTPATIENT
Start: 2017-10-11 | End: 2017-10-11

## 2017-10-11 RX ORDER — NICOTINE POLACRILEX 4 MG
15 LOZENGE BUCCAL
Status: DISCONTINUED | OUTPATIENT
Start: 2017-10-11 | End: 2017-10-12 | Stop reason: HOSPADM

## 2017-10-11 RX ORDER — SODIUM CHLORIDE 9 MG/ML
150 INJECTION, SOLUTION INTRAVENOUS CONTINUOUS
Status: DISCONTINUED | OUTPATIENT
Start: 2017-10-11 | End: 2017-10-12 | Stop reason: HOSPADM

## 2017-10-11 RX ADMIN — SODIUM CHLORIDE 1000 ML: 9 INJECTION, SOLUTION INTRAVENOUS at 15:57

## 2017-10-11 RX ADMIN — HUMAN INSULIN 5 UNITS: 100 INJECTION, SOLUTION SUBCUTANEOUS at 17:14

## 2017-10-11 RX ADMIN — PROMETHAZINE HYDROCHLORIDE 12.5 MG: 25 INJECTION INTRAMUSCULAR; INTRAVENOUS at 15:58

## 2017-10-11 RX ADMIN — SODIUM CHLORIDE 1000 ML: 9 INJECTION, SOLUTION INTRAVENOUS at 17:18

## 2017-10-11 RX ADMIN — DEXTROSE MONOHYDRATE 12.5 G: 500 INJECTION PARENTERAL at 18:41

## 2017-10-11 RX ADMIN — INSULIN ASPART 10 UNITS: 100 INJECTION, SOLUTION INTRAVENOUS; SUBCUTANEOUS at 23:43

## 2017-10-11 NOTE — ED PROVIDER NOTES
Subjective   Patient is a 22 y.o. female presenting with hyperglycemia.   Hyperglycemia   Severity:  Moderate  Onset quality:  Gradual  Progression:  Worsening  Chronicity:  Recurrent  Diabetes status:  Controlled with insulin  Context: recent illness    Relieved by:  Nothing  Ineffective treatments:  Insulin  Associated symptoms: fatigue    Associated symptoms: no abdominal pain, no chest pain, no dysuria and no fever        Review of Systems   Constitutional: Positive for appetite change and fatigue. Negative for fever.   HENT: Negative.    Respiratory: Negative.    Cardiovascular: Negative.  Negative for chest pain.   Gastrointestinal: Negative.  Negative for abdominal pain.   Endocrine: Negative.    Genitourinary: Negative.  Negative for dysuria.   Skin: Negative.    Neurological: Negative.    Psychiatric/Behavioral: Negative.    All other systems reviewed and are negative.      Past Medical History:   Diagnosis Date   • Anxiety 2014    took meds briefly   • Diabetes mellitus 2000    Type 1   • Gestational hypertension 2016   • Hypertension 2016    With pregnancy (2017-pt denies previously recorded dx of CHTN)   • Kidney failure 2016    R/T diabetes (8/10/2017- patient denies)    • Polyhydramnios        No Known Allergies    Past Surgical History:   Procedure Laterality Date   • FINGER/THUMB ARTHROPLASTY Left     age 9 yr   • FINGER/THUMB CLOSED REDUCTION Left 2004   • FINGER/THUMB CLOSED REDUCTION         Family History   Problem Relation Age of Onset   • Hypertension Father    • Cancer Maternal Grandfather    • Cancer Paternal Grandmother    • Arthritis Paternal Grandfather    • Heart attack Paternal Grandfather    • Hypertension Paternal Grandfather        Social History     Social History   • Marital status: Single     Spouse name: N/A   • Number of children: N/A   • Years of education: N/A     Social History Main Topics   • Smoking status: Never Smoker   • Smokeless tobacco: Never Used   • Alcohol use No    • Drug use: No   • Sexual activity: Yes     Partners: Male     Other Topics Concern   • None     Social History Narrative           Objective   Physical Exam   Constitutional: She is oriented to person, place, and time. She appears well-developed and well-nourished. No distress.   HENT:   Head: Normocephalic and atraumatic.   Right Ear: External ear normal.   Left Ear: External ear normal.   Nose: Nose normal.   Eyes: Conjunctivae and EOM are normal. Pupils are equal, round, and reactive to light.   Neck: Normal range of motion. Neck supple. No JVD present. No tracheal deviation present.   Cardiovascular: Normal rate, regular rhythm and normal heart sounds.    No murmur heard.  Pulmonary/Chest: Effort normal and breath sounds normal. No respiratory distress. She has no wheezes.   Abdominal: Soft. Bowel sounds are normal. There is no tenderness.   Musculoskeletal: Normal range of motion. She exhibits no edema or deformity.   Neurological: She is alert and oriented to person, place, and time. No cranial nerve deficit.   Skin: Skin is warm and dry. No rash noted. She is not diaphoretic. No erythema. No pallor.   Psychiatric: She has a normal mood and affect. Her behavior is normal. Thought content normal.   Nursing note and vitals reviewed.      Procedures         ED Course  ED Course                  MDM    Final diagnoses:   IDDM (insulin dependent diabetes mellitus)            Rolando Malik MD  10/20/17 [id number]

## 2017-10-11 NOTE — ED NOTES
Spoke to l&d, was asked if we could hold the pt. Was told there was not anyone available to monitor the pt due to stat  being performed. Lead nurse notified     Carol Michelle RN  10/11/17 2616

## 2017-10-12 VITALS
SYSTOLIC BLOOD PRESSURE: 115 MMHG | RESPIRATION RATE: 18 BRPM | HEART RATE: 85 BPM | HEIGHT: 66 IN | DIASTOLIC BLOOD PRESSURE: 71 MMHG | BODY MASS INDEX: 31.82 KG/M2 | TEMPERATURE: 97.6 F | WEIGHT: 198 LBS

## 2017-10-12 LAB
GLUCOSE BLDC GLUCOMTR-MCNC: 178 MG/DL (ref 70–130)
GLUCOSE BLDC GLUCOMTR-MCNC: 268 MG/DL (ref 70–130)

## 2017-10-12 PROCEDURE — 82962 GLUCOSE BLOOD TEST: CPT

## 2017-10-12 PROCEDURE — 63710000001 INSULIN ASPART PER 5 UNITS: Performed by: OBSTETRICS & GYNECOLOGY

## 2017-10-12 PROCEDURE — G0378 HOSPITAL OBSERVATION PER HR: HCPCS

## 2017-10-12 PROCEDURE — 59025 FETAL NON-STRESS TEST: CPT

## 2017-10-12 RX ORDER — ASPIRIN 81 MG/1
81 TABLET ORAL DAILY
Status: CANCELLED | OUTPATIENT
Start: 2017-10-12

## 2017-10-12 RX ORDER — PRENATAL VIT/IRON FUM/FOLIC AC 27MG-0.8MG
1 TABLET ORAL DAILY
Status: DISCONTINUED | OUTPATIENT
Start: 2017-10-12 | End: 2017-10-12 | Stop reason: HOSPADM

## 2017-10-12 RX ORDER — PRENATAL VIT/IRON FUM/FOLIC AC 27MG-0.8MG
1 TABLET ORAL DAILY
COMMUNITY
End: 2018-05-24

## 2017-10-12 RX ADMIN — SODIUM CHLORIDE 150 ML/HR: 9 INJECTION, SOLUTION INTRAVENOUS at 07:21

## 2017-10-12 RX ADMIN — INSULIN ASPART 8 UNITS: 100 INJECTION, SOLUTION INTRAVENOUS; SUBCUTANEOUS at 07:20

## 2017-10-12 RX ADMIN — INSULIN ASPART 3 UNITS: 100 INJECTION, SOLUTION INTRAVENOUS; SUBCUTANEOUS at 02:24

## 2017-10-12 NOTE — ED PROVIDER NOTES
Subjective   HPI Comments: 27 week GA IUP with pregestational IDDM. C/O N/V and decreased baby movement    Patient is a 22 y.o. female presenting with vomiting.   History provided by:  Patient   used: No    Vomiting   The primary symptoms include nausea and vomiting. Primary symptoms do not include fever, abdominal pain or dysuria. Primary symptoms comment: decrease baby movement. The illness began today. The onset was gradual. The problem has been gradually worsening.       Review of Systems   Constitutional: Negative.  Negative for fever.   HENT: Negative.    Respiratory: Negative.    Cardiovascular: Negative.  Negative for chest pain.   Gastrointestinal: Positive for nausea and vomiting. Negative for abdominal pain.   Endocrine: Negative.    Genitourinary: Negative.  Negative for dysuria.   Skin: Negative.    Neurological: Negative.    Psychiatric/Behavioral: Negative.    All other systems reviewed and are negative.      Past Medical History:   Diagnosis Date   • Anxiety 2014    took meds briefly   • Diabetes mellitus 2000    Type 1   • Gestational hypertension 2016   • Hypertension 2016    With pregnancy (2017-[facility] denies [facility]   • Kidney failure 2016    R/T diabetes (8/10/2017- patient denies)    • Polyhydramnios        No Known Allergies    Past Surgical History:   Procedure Laterality Date   • FINGER/THUMB ARTHROPLASTY Left     age 9 yr   • FINGER/THUMB CLOSED REDUCTION Left 2004   • FINGER/THUMB CLOSED REDUCTION         Family History   Problem Relation Age of Onset   • Hypertension Father    • Cancer Maternal Grandfather    • Cancer Paternal Grandmother    • Arthritis Paternal Grandfather    • Heart attack Paternal Grandfather    • Hypertension Paternal Grandfather        Social History     Social History   • Marital status: Single     Spouse name: N/A   • Number of children: N/A   • Years of education: N/A     Social History Main Topics   • Smoking status: Never  Smoker   • Smokeless tobacco: Never Used   • Alcohol use No   • Drug use: No   • Sexual activity: Yes     Partners: Male     Other Topics Concern   • None     Social History Narrative           Objective   Physical Exam   Constitutional: She is oriented to person, place, and time. She appears well-developed and well-nourished. No distress.   HENT:   Head: Normocephalic and atraumatic.   Right Ear: External ear normal.   Left Ear: External ear normal.   Nose: Nose normal.   Eyes: Conjunctivae and EOM are normal. Pupils are equal, round, and reactive to light.   Neck: Normal range of motion. Neck supple. No JVD present. No tracheal deviation present.   Cardiovascular: Normal rate, regular rhythm and normal heart sounds.    No murmur heard.  Pulmonary/Chest: Effort normal and breath sounds normal. No respiratory distress. She has no wheezes.   Abdominal: Soft. Bowel sounds are normal. There is no tenderness.   Gravid c/w dates. FHT's 150's   Musculoskeletal: Normal range of motion. She exhibits no edema or deformity.   Neurological: She is alert and oriented to person, place, and time. No cranial nerve deficit.   Skin: Skin is warm and dry. No rash noted. She is not diaphoretic. No erythema. No pallor.   Psychiatric: She has a normal mood and affect. Her behavior is normal. Thought content normal.   Nursing note and vitals reviewed.      Procedures        Lab Results (last 24 hours)     Procedure Component Value Units Date/Time    CBC & Differential [[id number]] Collected:  10/11/17 1552    Specimen:  Blood Updated:  10/11/17 1605    Narrative:       The following orders were created for panel order CBC & Differential.  Procedure                               Abnormality         Status                     ---------                               -----------         ------                     CBC Auto Differential[[id number]]        Abnormal            Final result                 Please view results for these tests on the  individual orders.    Comprehensive Metabolic Panel [[id number]]  (Abnormal) Collected:  10/11/17 1552    Specimen:  Blood from Arm, Right Updated:  10/11/17 1652     Glucose 300 (H) mg/dL      BUN 13 mg/dL      Creatinine 0.94 mg/dL      Sodium 132 (L) mmol/L      Potassium 4.8 mmol/L       2+ Hemolysis          Chloride 104 mmol/L      CO2 10.3 (L) mmol/L      Calcium 9.5 mg/dL      Total Protein 8.4 (H) g/dL      Albumin 4.50 g/dL      ALT (SGPT) 20 U/L      AST (SGOT) 29 U/L      Alkaline Phosphatase 95 U/L       Note New Reference Ranges        Total Bilirubin 0.4 mg/dL      eGFR Non African Amer 76 mL/min/1.73      Globulin 3.9 gm/dL      A/G Ratio 1.2 (L) g/dL      BUN/Creatinine Ratio 13.8     Anion Gap 17.7 (H) mmol/L     CBC Auto Differential [[id number]]  (Abnormal) Collected:  10/11/17 1552    Specimen:  Blood from Arm, Right Updated:  10/11/17 1605     WBC 11.65 10*3/mm3      RBC 4.85 10*6/mm3      Hemoglobin 13.9 g/dL      Hematocrit 42.0 %      MCV 86.6 fL      MCH 28.7 pg      MCHC 33.1 g/dL      RDW 13.6 %      RDW-SD 42.5 fl      MPV 12.0 (H) fL      Platelets 243 10*3/mm3      Neutrophil % 84.3 (H) %      Lymphocyte % 9.8 (L) %      Monocyte % 4.6 %      Eosinophil % 0.1 %      Basophil % 0.3 %      Immature Grans % 0.9 (H) %      Neutrophils, Absolute 9.82 (H) 10*3/mm3      Lymphocytes, Absolute 1.14 10*3/mm3      Monocytes, Absolute 0.54 10*3/mm3      Eosinophils, Absolute 0.01 10*3/mm3      Basophils, Absolute 0.03 10*3/mm3      Immature Grans, Absolute 0.11 (H) 10*3/mm3     Urinalysis With / Culture If Indicated - Urine, Clean Catch [[id number]]  (Abnormal) Collected:  10/11/17 1603    Specimen:  Urine from Urine, Clean Catch Updated:  10/11/17 1612     Color, UA Yellow     Appearance, UA Clear     pH, UA <=5.0     Specific Gravity, UA >1.030 (H)     Glucose, UA >=1000 mg/dL (3+) (A)     Ketones, UA >=160 mg/dL (4+) (A)     Bilirubin, UA Negative     Blood, UA Negative     Protein, UA Trace (A)      Leuk Esterase, UA Negative     Nitrite, UA Negative     Urobilinogen, UA 0.2 E.U./dL    Narrative:       Urine microscopic not indicated.    POC Glucose Fingerstick [[id number]]  (Abnormal) Collected:  10/11/17 1835    Specimen:  Blood Updated:  10/11/17 1842     Glucose 57 (L) mg/dL     Narrative:       Meter: [id number] : [id number] alex wetzel    POC Glucose Fingerstick [[id number]]  (Normal) Collected:  10/11/17 1853    Specimen:  Blood Updated:  10/11/17 1859     Glucose 108 mg/dL     Narrative:       Meter: [id number] : [id number] YIN YODER    POC Glucose Fingerstick [[id number]]  (Abnormal) Collected:  10/11/17 2051    Specimen:  Blood Updated:  10/11/17 2101     Glucose 267 (H) mg/dL     Narrative:       Meter: [id number] : [id number] ZAK WETZEL            ED Course  ED Course      Discussed with Dr maki and will send to L&D            MDM    Final diagnoses:   IDDM (insulin dependent diabetes mellitus)            Christiano Thomas MD  10/11/17 2156

## 2017-10-12 NOTE — H&P
Chief complaint   Chief Complaint   Patient presents with   • Vomiting     seen in er for nausea/vomiting dehydration       History of Present Illness: Patient is a 22 y.o. female who presents with IUP at 26w6d weeks gestation. G 3, P 0110. Nausea. Vomiting. Elevated Glucose.       Past Medical History:   Diagnosis Date   • Anxiety 2014    took meds briefly   • Diabetes mellitus 2000    Type 1   • Gestational hypertension 2016   • Hypertension 2016    With pregnancy (2017-pt denies previously recorded dx of [place])   • Kidney failure 2016    R/T diabetes (8/10/2017- patient denies)    • Polyhydramnios    History of IUFD  Type 1 Diabetic    Blood Type: O -  Fetal Gender Female    Past Surgical History:   Procedure Laterality Date   • FINGER/THUMB ARTHROPLASTY Left     age 9 yr   • FINGER/THUMB CLOSED REDUCTION Left 2004   • FINGER/THUMB CLOSED REDUCTION       Family History   Problem Relation Age of Onset   • Hypertension Father    • Cancer Maternal Grandfather    • Cancer Paternal Grandmother    • Arthritis Paternal Grandfather    • Heart attack Paternal Grandfather    • Hypertension Paternal Grandfather      Social History   Substance Use Topics   • Smoking status: Never Smoker   • Smokeless tobacco: Never Used   • Alcohol use No     Prescriptions Prior to Admission   Medication Sig Dispense Refill Last Dose   • Acetone, Urine, Test (KETONE TEST) strip 1 strip by In Vitro route Daily. Test urine ketones daily in am 50 strip 3    • aspirin 81 MG EC tablet Take 81 mg by mouth Daily.   Taking   • Blood Glucose Monitoring Suppl (ONE TOUCH ULTRA MINI) w/Device kit 1 Device Daily. 1 each 0    • glucagon (GLUCAGEN) 1 MG injection Inject 1 mg under the skin See Admin Instructions. Follow package directions for low blood sugar. 1 kit 5    • glucose blood (ONE TOUCH ULTRA TEST) test strip Test blood sugars 4 - 6 times per day 200 each 5    • insulin aspart (NOVOLOG FLEXPEN) 100 UNIT/ML solution pen-injector sc pen Inject  10 - 25 units TID and prn correction dose 5 pen 3    • insulin detemir (LEVEMIR FLEXPEN) 100 UNIT/ML injection Inject 52 units subQ daily (Patient taking differently: Inject 56 units subQ daily) 5 pen 3    • insulin lispro (HumaLOG) 100 UNIT/ML injection Inject 0-14 Units under the skin 4 (Four) Times a Day With Meals & at Bedtime. (Patient taking differently: Inject 0-14 Units under the skin 4 (Four) Times a Day With Meals & at Bedtime. 1u/7gm carb ratio; takes with each meal and snack; also does correction scale - Inject 7-20 units TID with meals) 2 mL 12 Taking   • Insulin Pen Needle (B-D ULTRAFINE III SHORT PEN) 31G X 8 MM misc Use qid to administer insulin 100 each 5    • ONETOUCH DELICA LANCETS 33G misc Test blood sugars 4 - 6 times per day 200 each 5    • Syringe, Disposable, 1 ML misc 1 mL 4 (Four) Times a Day Before Meals & at Bedtime. 100 each 1 Taking     Allergies:  Review of patient's allergies indicates no known allergies.      Vital Signs  Temp:  [97.9 °F (36.6 °C)] 97.9 °F (36.6 °C)  Heart Rate:  [105-128] 105  Resp:  [18] 18  BP: (116-133)/(79-92) 119/79    Radiology  Imaging Results (last 24 hours)     ** No results found for the last 24 hours. **          Labs  Lab Results (last 24 hours)     Procedure Component Value Units Date/Time    POC Glucose Fingerstick [[id number]]  (Abnormal) Collected:  10/11/17 2051    Specimen:  Blood Updated:  10/11/17 2101     Glucose 267 (H) mg/dL     Narrative:       Meter: [id number] : [id number] ZAK WETZEL            Review of Systems    The following systems were reviewed and negative;  ENT, respiratory, cardiovascular, gastrointestinal, genitourinary, breast, endocrine and allergies / immunologic.      Physical Exam:      General Appearance:    Alert, cooperative, in no acute distress   Head:    Normocephalic, without obvious abnormality, atraumatic   Eyes:            Lids and lashes normal, conjunctivae and sclerae normal, no   icterus, no pallor,  corneas clear, PERRLA   Ears:    Ears appear intact with no abnormalities noted   Throat:   No oral lesions, no thrush, oral mucosa moist   Neck:   No adenopathy, supple, trachea midline, no thyromegaly, no     carotid bruit, no JVD   Back:     No kyphosis present, no scoliosis present, no skin lesions,       erythema or scars, no tenderness to percussion or                   palpation,   range of motion normal   Lungs:     Clear to auscultation,respirations regular, even and                   unlabored    Heart:    Regular rhythm and normal rate, normal S1 and S2, no            murmur, no gallop, no rub, no click   Breast Exam:    Deferred   Abdomen:     Normal bowel sounds, no masses, no organomegaly, soft        non-tender, non-distended, no guarding, no rebound                 tenderness   Genitalia:    Deferred   Extremities:   Moves all extremities well, no edema, no cyanosis, no              redness   Pulses:   Pulses palpable and equal bilaterally   Skin:   No bleeding, bruising or rash   Lymph nodes:   No palpable adenopathy   Neurologic:   Cranial nerves 2 - 12 grossly intact, sensation intact, DTR        present and equal bilaterally         Assessment: Patient is a 22 y.o. female who presents with IUP at 26w6d weeks gestation. G 3, . Elevated glucose. Nausea & Vomiting.     Chief Complaint   Patient presents with   • Vomiting     seen in er for nausea/vomiting dehydration       Plan of Care: Admit. Proceed with IV hydration, sliding scale insulin, glucose control. Hospitalist consult.       Jose Noel III, MD  10/11/17  11:00 PM

## 2017-10-12 NOTE — PLAN OF CARE
Problem: Patient Care Overview (Adult)  Goal: Plan of Care Review  Outcome: Ongoing (interventions implemented as appropriate)    10/12/17 0751   Coping/Psychosocial Response Interventions   Plan Of Care Reviewed With patient   Patient Care Overview   Progress no change       Goal: Adult Individualization and Mutuality  Outcome: Ongoing (interventions implemented as appropriate)  Goal: Discharge Needs Assessment  Outcome: Ongoing (interventions implemented as appropriate)    Problem: Diabetes, Type 1 (Adult)  Goal: Signs and Symptoms of Listed Potential Problems Will be Absent or Manageable (Diabetes, Type 1)  Outcome: Ongoing (interventions implemented as appropriate)

## 2017-10-12 NOTE — DISCHARGE SUMMARY
Discharge Summary    Admit Date: 10/11/2017  8:15 PM    Admit Diagnosis: Pregnancy [Z34.90]    Date of Discharge:  10/12/2017    Discharge Diagnosis: Same        Hospital Course  Patient is a 22 y.o. female,  @ 27.0 wks. Patient was admitted due to N/V/, h/o IUFD and type I DM. Patient doing better. Symptoms have improved. Patient tolerating a regular diet and ambulating without difficulty. Will discharge to home today. Pt denies VB/LOF/ctx, +FM. Pt strongly desires to be discharged. Pt declines discussion about insulin adjustments or pump, states she is going to continue current insulin regimen despite intermittent elevated BSs.        Vital Signs  Temp:  [97.6 °F (36.4 °C)-98.5 °F (36.9 °C)] 97.6 °F (36.4 °C)  Heart Rate:  [] 85  Resp:  [18] 18  BP: (115-133)/(66-92) 115/71    Review of Systems    The following systems were reviewed and negative;  ENT, respiratory, cardiovascular, gastrointestinal, genitourinary, breast, endocrine and allergies / immunologic.      Physical Exam:      General Appearance:    Alert, cooperative, in no acute distress   Head:    Normocephalic, without obvious abnormality, atraumatic   Eyes:            Lids and lashes normal, conjunctivae and sclerae normal, no   icterus, no pallor, corneas clear, PERRLA   Ears:    Ears appear intact with no abnormalities noted   Throat:   No oral lesions, no thrush, oral mucosa moist   Neck:   No adenopathy, supple, trachea midline, no thyromegaly, no     carotid bruit, no JVD   Back:     No kyphosis present, no scoliosis present, no skin lesions,       erythema or scars, no tenderness to percussion or                   palpation,   range of motion normal   Lungs:     Clear to auscultation,respirations regular, even and                   unlabored    Heart:    Regular rhythm and normal rate, normal S1 and S2, no            murmur, no gallop, no rub, no click   Breast Exam:    Deferred   Abdomen:     Normal bowel sounds, no masses, no  organomegaly, soft        non-tender, gravid uterus, no guarding, no rebound                 tenderness   Genitalia:    Deferred   Extremities:   Moves all extremities well, no edema, no cyanosis, no              redness   Pulses:   Pulses palpable and equal bilaterally   Skin:   No bleeding, bruising or rash   Lymph nodes:   No palpable adenopathy   Neurologic:   Cranial nerves 2 - 12 grossly intact, sensation intact, DTR        present and equal bilaterally             Condition on Discharge:  Stable    Discharge Diet: Regular    Follow-up Appointments  Patient will follow up in clinic this week. ER warnings discussed.       Sylvia James DO   10/12/17  10:20 AM

## 2017-10-12 NOTE — NON STRESS TEST
Emily Bagley, a  at 27w0d with an GAVINO of 2018, by Ultrasound, was seen at [facility] LABOR DELIV[facility] for a nonstress test.    Chief Complaint   Patient presents with   • Vomiting     seen in er for nausea/vomiting dehydration     Diabetic type 1     Reactive  Brittany Crawford RN

## 2017-10-26 ENCOUNTER — OFFICE VISIT (OUTPATIENT)
Dept: ENDOCRINOLOGY | Facility: CLINIC | Age: 22
End: 2017-10-26

## 2017-10-26 VITALS
WEIGHT: 199 LBS | HEART RATE: 85 BPM | OXYGEN SATURATION: 99 % | SYSTOLIC BLOOD PRESSURE: 120 MMHG | HEIGHT: 66 IN | DIASTOLIC BLOOD PRESSURE: 60 MMHG | BODY MASS INDEX: 31.98 KG/M2

## 2017-10-26 DIAGNOSIS — O24.013 PRE-EXISTING TYPE 1 DIABETES MELLITUS DURING PREGNANCY IN THIRD TRIMESTER: Primary | ICD-10-CM

## 2017-10-26 DIAGNOSIS — I10 ESSENTIAL HYPERTENSION: ICD-10-CM

## 2017-10-26 DIAGNOSIS — E03.9 ACQUIRED HYPOTHYROIDISM: ICD-10-CM

## 2017-10-26 LAB
GLUCOSE BLDC GLUCOMTR-MCNC: 351 MG/DL (ref 70–130)
HBA1C MFR BLD: 10 %

## 2017-10-26 PROCEDURE — 99214 OFFICE O/P EST MOD 30 MIN: CPT | Performed by: INTERNAL MEDICINE

## 2017-10-26 PROCEDURE — 82947 ASSAY GLUCOSE BLOOD QUANT: CPT | Performed by: INTERNAL MEDICINE

## 2017-10-26 PROCEDURE — 83036 HEMOGLOBIN GLYCOSYLATED A1C: CPT | Performed by: INTERNAL MEDICINE

## 2017-10-26 NOTE — ASSESSMENT & PLAN NOTE
IDDM on levemir and novolog   Results for orders placed or performed in visit on 10/26/17   POC Glycosylated Hemoglobin (Hb A1C)   Result Value Ref Range    Hemoglobin A1C 10.0 %   POC Glucose Fingerstick   Result Value Ref Range    Glucose 351 (A) 70 - 130 mg/dL     Average sugar is 240   She is due for eye exam  No neuropathy   Ur alb neg early pregnancy   Risk of higher sugars reviewed, especially risk of dka and fetal demise  She is aware of these risks  Refills sent for levemir and novolog  She was asked to increase levemir by 2 units every other day until fasting sugar is under 100  Based on total daily dose we reviewed insulin to carb ratio should be 1:3 with correction factor of 10   F/u 3-4 months

## 2017-10-26 NOTE — PROGRESS NOTES
Emily Bagley 22 y.o.  [place]:Follow-up and Diabetes (Type I with pregnancy, GAVINO 1-, OB: Dr Rodriguez in [facility])    [place]: Follow-up and Diabetes (Type I with pregnancy, GAVINO 1-, OB: Dr Rodriguez in [facility])  blood sugar and 90 day average sugar reviewed  Results for orders placed or performed in visit on 10/26/17   POC Glycosylated Hemoglobin (Hb A1C)   Result Value Ref Range    Hemoglobin A1C 10.0 %   POC Glucose Fingerstick   Result Value Ref Range    Glucose 351 (A) 70 - 130 mg/dL     Average sugar is 240  This is the same as last ov   She is on levemir 60 units daily  She takes novolog 10-27 u before meals  All sugars are high- especially fasting and sugars are rising overnight   She has not been sending in blood sugars by email   She states insurance is not covering insulin   Risk of dka discussed with her - risk of fetal demise reviewed  Samples provided today of levemir and novolog pens and printed PA for novolog pens provided as well  We have never gotten a PA request from [facility]   She denies headache or floaters  No neuropathy or foot lesions  Ur alb neg 8/17    No Known Allergies    Current Outpatient Prescriptions:   •  aspirin 81 MG EC tablet, Take 81 mg by mouth Daily., Disp: , Rfl:   •  insulin aspart (novoLOG) 100 UNIT/ML injection, Inject 10-27 Units under the skin 3 (Three) Times a Day Before Meals. Patient says she uses on sliding scale by counting carbs, Disp: , Rfl:   •  insulin detemir (LEVEMIR) 100 UNIT/ML injection, Inject 60 Units under the skin Daily., Disp: , Rfl:   •  Prenatal Vit-Fe Fumarate-FA (PRENATAL VITAMIN 27-0.8) 27-0.8 MG tablet tablet, Take 1 tablet by mouth Daily., Disp: , Rfl:   Patient Active Problem List    Diagnosis   • Pregnancy [Z34.90]   • IDDM (insulin dependent diabetes mellitus) [E11.9, Z79.4]   • Endometritis [N71.9]   • PIH (pregnancy induced hypertension) [O13.9]   • DKA (diabetic ketoacidoses) [E13.10]   • Intrauterine fetal death in pregnancy, s/p  induction, spontaneous vaginal delivery 10/9/16, Laborist Dr Juan [O36.4XX0]   • Lactic acidosis [E87.2]   • N&V (nausea and vomiting) [R11.2]   • Hypothyroid [E03.9]   • Pre-existing type 1 diabetes mellitus during pregnancy in third trimester [O24.013]   • Polyhydramnios [O40.9XX0]   • Chronic hypertension in pregnancy [O10.919]   • Hypertension [I10]     Overview Note:     With pregnancy     • Diabetes education, encounter for [Z71.89]     Overview Note:     Type 1       Review of Systems   Constitutional: Negative for activity change, appetite change, chills, diaphoresis, fatigue, fever and unexpected weight change.   HENT: Negative for congestion, dental problem, drooling, ear discharge, ear pain, facial swelling, hearing loss, mouth sores, nosebleeds, postnasal drip, rhinorrhea, sinus pressure, sneezing, sore throat, tinnitus, trouble swallowing and voice change.    Eyes: Negative for photophobia, pain, discharge, redness, itching and visual disturbance.   Respiratory: Negative for apnea, cough, choking, chest tightness, shortness of breath, wheezing and stridor.    Cardiovascular: Negative for chest pain, palpitations and leg swelling.   Gastrointestinal: Negative for abdominal distention, abdominal pain, anal bleeding, blood in stool, constipation, diarrhea, nausea, rectal pain and vomiting.   Endocrine: Negative for cold intolerance, heat intolerance, polydipsia, polyphagia and polyuria.   Genitourinary: Negative for decreased urine volume, difficulty urinating, dysuria, enuresis, flank pain, frequency, genital sores, hematuria and urgency.   Musculoskeletal: Negative for arthralgias, back pain, gait problem, joint swelling, myalgias, neck pain and neck stiffness.   Skin: Negative for color change, pallor, rash and wound.   Allergic/Immunologic: Negative for environmental allergies, food allergies and immunocompromised state.   Neurological: Negative for dizziness, tremors, seizures, syncope, facial  "asymmetry, speech difficulty, weakness, light-headedness, numbness and headaches.   Hematological: Negative for adenopathy. Does not bruise/bleed easily.   Psychiatric/Behavioral: Negative for agitation, behavioral problems, confusion, decreased concentration, dysphoric mood, hallucinations, self-injury, sleep disturbance and suicidal ideas. The patient is not nervous/anxious and is not hyperactive.      Social History     Social History   • Marital status: Single     Spouse name: N/A   • Number of children: N/A   • Years of education: N/A     Occupational History   • Not on file.     Social History Main Topics   • Smoking status: Never Smoker   • Smokeless tobacco: Never Used   • Alcohol use No   • Drug use: No   • Sexual activity: Yes     Partners: Male     Other Topics Concern   • Not on file     Social History Narrative     Family History   Problem Relation Age of Onset   • Hypertension Father    • Cancer Maternal Grandfather    • Cancer Paternal Grandmother    • Arthritis Paternal Grandfather    • Heart attack Paternal Grandfather    • Hypertension Paternal Grandfather      /60  Pulse 85  Ht 66\" (167.6 cm)  Wt 199 lb (90.3 kg)  LMP 04/04/2017  SpO2 99%  BMI 32.12 kg/m2  Physical Exam   Constitutional: She is oriented to person, place, and time. She appears well-developed and well-nourished.   HENT:   Head: Normocephalic and atraumatic.   Nose: Nose normal.   Mouth/Throat: Oropharynx is clear and moist.   Eyes: Conjunctivae, EOM and lids are normal. Pupils are equal, round, and reactive to light.   Neck: Trachea normal and normal range of motion. Neck supple. Carotid bruit is not present. No tracheal deviation present. No thyroid mass and no thyromegaly present.   Cardiovascular: Normal rate, regular rhythm, normal heart sounds and intact distal pulses.  Exam reveals no gallop and no friction rub.    No murmur heard.  Pulmonary/Chest: Effort normal and breath sounds normal. No respiratory distress. " She has no wheezes.   Musculoskeletal: Normal range of motion. She exhibits no edema or deformity.    Emily had a diabetic foot exam performed today.   During the foot exam she had a monofilament test performed.    Neurological Sensory Findings - Unaltered hot/cold right ankle/foot discrimination and unaltered hot/cold left ankle/foot discrimination. Unaltered sharp/dull right ankle/foot discrimination and unaltered sharp/dull left ankle/foot discrimination. No right ankle/foot altered proprioception and no left ankle/foot altered proprioception    Vascular Status -  Her exam exhibits right foot vasculature normal. Her exam exhibits no right foot edema. Her exam exhibits left foot vasculature normal. Her exam exhibits no left foot edema.   Skin Integrity  -  Her right foot skin is intact.     Emily 's left foot skin is intact. .  Lymphadenopathy:     She has no cervical adenopathy.   Neurological: She is alert and oriented to person, place, and time. She has normal reflexes. She displays normal reflexes. No cranial nerve deficit.   Skin: Skin is warm and dry. No rash noted. No cyanosis or erythema. Nails show no clubbing.   Psychiatric: She has a normal mood and affect. Her speech is normal and behavior is normal. Judgment and thought content normal. Cognition and memory are normal.   Nursing note and vitals reviewed.    Results for orders placed or performed during the hospital encounter of 10/11/17   Chlamydia trachomatis, Neisseria gonorrhoeae, PCR w/ confirmation - Swab, Vagina   Result Value Ref Range    External Chlamydia Screen NEG    Gonorrhea Screen - Swab,   Result Value Ref Range    External Gonorrhea Screen NEG    Hemoglobin   Result Value Ref Range    External Hemoglobin 12.9 g/dL   Hepatitis B Surface Antigen   Result Value Ref Range    External Hepatitis B Surface Ag Negative    RPR   Result Value Ref Range    External RPR Non-Reactive    Rubella Antibody, IgG   Result Value Ref Range    External  Rubella Qual Immune    HIV-1 Antibody, EIA   Result Value Ref Range    External HIV-1 Antibody Non-Reactive    POC Glucose Fingerstick   Result Value Ref Range    Glucose 267 (H) 70 - 130 mg/dL   POC Glucose Fingerstick   Result Value Ref Range    Glucose 310 (H) 70 - 130 mg/dL   POC Glucose Fingerstick   Result Value Ref Range    Glucose 178 (H) 70 - 130 mg/dL   POC Glucose Fingerstick   Result Value Ref Range    Glucose 268 (H) 70 - 130 mg/dL   Antibody Screen   Result Value Ref Range    External Antibody Screen Negative    ABO / Rh   Result Value Ref Range    External ABO Grouping O    ABO / Rh   Result Value Ref Range    External Rh Factor Negative      Emily was seen today for follow-up and diabetes.    Diagnoses and all orders for this visit:    Pre-existing type 1 diabetes mellitus during pregnancy in third trimester  -     POC Glycosylated Hemoglobin (Hb A1C)  -     POC Glucose Fingerstick      Problem List Items Addressed This Visit        Cardiovascular and Mediastinum    Hypertension     bp is good             Endocrine    Pre-existing type 1 diabetes mellitus during pregnancy in third trimester - Primary     IDDM on levemir and novolog   Results for orders placed or performed in visit on 10/26/17   POC Glycosylated Hemoglobin (Hb A1C)   Result Value Ref Range    Hemoglobin A1C 10.0 %   POC Glucose Fingerstick   Result Value Ref Range    Glucose 351 (A) 70 - 130 mg/dL     Average sugar is 240   She is due for eye exam  No neuropathy   Ur alb neg early pregnancy   Risk of higher sugars reviewed, especially risk of dka and fetal demise  She is aware of these risks  Refills sent for levemir and novolog  She was asked to increase levemir by 2 units every other day until fasting sugar is under 100  Based on total daily dose we reviewed insulin to carb ratio should be 1:3 with correction factor of 10   F/u 3-4 months          Relevant Medications    insulin aspart (novoLOG FLEXPEN) 100 UNIT/ML solution  pen-injector sc pen    insulin detemir (LEVEMIR) 100 UNIT/ML injection    Other Relevant Orders    POC Glycosylated Hemoglobin (Hb A1C) (Completed)    POC Glucose Fingerstick (Completed)    Hypothyroid     tsh 8/17 was 3.6            Return in about 3 weeks (around 11/16/2017) for Recheck 30 min .      Crystal Reynolds MA  Signed Ne Kerr MD

## 2017-11-07 ENCOUNTER — HOSPITAL ENCOUNTER (OUTPATIENT)
Facility: HOSPITAL | Age: 22
Setting detail: OBSERVATION
Discharge: HOME | End: 2017-11-08
Attending: OBSTETRICS & GYNECOLOGY | Admitting: OBSTETRICS & GYNECOLOGY

## 2017-11-07 LAB
ALBUMIN SERPL-MCNC: 3.6 G/DL (ref 3.5–5)
ALBUMIN/GLOB SERPL: 1.2 G/DL (ref 1.5–2.5)
ALP SERPL-CCNC: 81 U/L (ref 35–104)
ALT SERPL W P-5'-P-CCNC: 17 U/L (ref 10–36)
ANION GAP SERPL CALCULATED.3IONS-SCNC: 11.1 MMOL/L (ref 3.6–11.2)
AST SERPL-CCNC: 21 U/L (ref 10–30)
BACTERIA UR QL AUTO: NORMAL /HPF
BASOPHILS # BLD AUTO: 0.01 10*3/MM3 (ref 0–0.3)
BASOPHILS NFR BLD AUTO: 0.1 % (ref 0–2)
BILIRUB SERPL-MCNC: 0.5 MG/DL (ref 0.2–1.8)
BILIRUB UR QL STRIP: NEGATIVE
BUN BLD-MCNC: 9 MG/DL (ref 7–21)
BUN/CREAT SERPL: 15.5 (ref 7–25)
CALCIUM SPEC-SCNC: 8.9 MG/DL (ref 7.7–10)
CHLORIDE SERPL-SCNC: 106 MMOL/L (ref 99–112)
CLARITY UR: CLEAR
CO2 SERPL-SCNC: 15.9 MMOL/L (ref 24.3–31.9)
COLOR UR: YELLOW
CREAT BLD-MCNC: 0.58 MG/DL (ref 0.43–1.29)
DEPRECATED RDW RBC AUTO: 42.4 FL (ref 37–54)
EOSINOPHIL # BLD AUTO: 0.02 10*3/MM3 (ref 0–0.7)
EOSINOPHIL NFR BLD AUTO: 0.2 % (ref 0–5)
ERYTHROCYTE [DISTWIDTH] IN BLOOD BY AUTOMATED COUNT: 13.8 % (ref 11.5–14.5)
GFR SERPL CREATININE-BSD FRML MDRD: 133 ML/MIN/1.73
GLOBULIN UR ELPH-MCNC: 3.1 GM/DL
GLUCOSE BLD-MCNC: 127 MG/DL (ref 70–110)
GLUCOSE UR STRIP-MCNC: ABNORMAL MG/DL
HCT VFR BLD AUTO: 37 % (ref 37–47)
HGB BLD-MCNC: 11.9 G/DL (ref 12–16)
HGB UR QL STRIP.AUTO: NEGATIVE
HYALINE CASTS UR QL AUTO: NORMAL /LPF
IMM GRANULOCYTES # BLD: 0.09 10*3/MM3 (ref 0–0.03)
IMM GRANULOCYTES NFR BLD: 0.8 % (ref 0–0.5)
KETONES UR QL STRIP: ABNORMAL
LEUKOCYTE ESTERASE UR QL STRIP.AUTO: NEGATIVE
LYMPHOCYTES # BLD AUTO: 1.16 10*3/MM3 (ref 1–3)
LYMPHOCYTES NFR BLD AUTO: 10.5 % (ref 21–51)
MCH RBC QN AUTO: 27.7 PG (ref 27–33)
MCHC RBC AUTO-ENTMCNC: 32.2 G/DL (ref 33–37)
MCV RBC AUTO: 86.2 FL (ref 80–94)
MONOCYTES # BLD AUTO: 0.75 10*3/MM3 (ref 0.1–0.9)
MONOCYTES NFR BLD AUTO: 6.8 % (ref 0–10)
NEUTROPHILS # BLD AUTO: 9.01 10*3/MM3 (ref 1.4–6.5)
NEUTROPHILS NFR BLD AUTO: 81.6 % (ref 30–70)
NITRITE UR QL STRIP: NEGATIVE
OSMOLALITY SERPL CALC.SUM OF ELEC: 266.6 MOSM/KG (ref 273–305)
PH UR STRIP.AUTO: 5.5 [PH] (ref 5–8)
PLATELET # BLD AUTO: 247 10*3/MM3 (ref 130–400)
PMV BLD AUTO: 11.6 FL (ref 6–10)
POTASSIUM BLD-SCNC: 3.6 MMOL/L (ref 3.5–5.3)
PROT SERPL-MCNC: 6.7 G/DL (ref 6–8)
PROT UR QL STRIP: ABNORMAL
RBC # BLD AUTO: 4.29 10*6/MM3 (ref 4.2–5.4)
RBC # UR: NORMAL /HPF
REF LAB TEST METHOD: NORMAL
SODIUM BLD-SCNC: 133 MMOL/L (ref 135–153)
SP GR UR STRIP: >1.03 (ref 1–1.03)
SQUAMOUS #/AREA URNS HPF: NORMAL /HPF
URATE SERPL-MCNC: 4.6 MG/DL (ref 2.4–5.7)
UROBILINOGEN UR QL STRIP: ABNORMAL
WBC NRBC COR # BLD: 11.04 10*3/MM3 (ref 4.5–12.5)
WBC UR QL AUTO: NORMAL /HPF

## 2017-11-07 PROCEDURE — 84550 ASSAY OF BLOOD/URIC ACID: CPT | Performed by: OBSTETRICS & GYNECOLOGY

## 2017-11-07 PROCEDURE — 80053 COMPREHEN METABOLIC PANEL: CPT | Performed by: OBSTETRICS & GYNECOLOGY

## 2017-11-07 PROCEDURE — 96372 THER/PROPH/DIAG INJ SC/IM: CPT

## 2017-11-07 PROCEDURE — 87086 URINE CULTURE/COLONY COUNT: CPT | Performed by: OBSTETRICS & GYNECOLOGY

## 2017-11-07 PROCEDURE — 96361 HYDRATE IV INFUSION ADD-ON: CPT

## 2017-11-07 PROCEDURE — G0378 HOSPITAL OBSERVATION PER HR: HCPCS

## 2017-11-07 PROCEDURE — P9612 CATHETERIZE FOR URINE SPEC: HCPCS

## 2017-11-07 PROCEDURE — 25010000002 TERBUTALINE PER 1 MG

## 2017-11-07 PROCEDURE — G0463 HOSPITAL OUTPT CLINIC VISIT: HCPCS

## 2017-11-07 PROCEDURE — 85025 COMPLETE CBC W/AUTO DIFF WBC: CPT | Performed by: OBSTETRICS & GYNECOLOGY

## 2017-11-07 PROCEDURE — 59025 FETAL NON-STRESS TEST: CPT

## 2017-11-07 PROCEDURE — 81001 URINALYSIS AUTO W/SCOPE: CPT | Performed by: OBSTETRICS & GYNECOLOGY

## 2017-11-07 RX ORDER — SODIUM CHLORIDE, SODIUM LACTATE, POTASSIUM CHLORIDE, CALCIUM CHLORIDE 600; 310; 30; 20 MG/100ML; MG/100ML; MG/100ML; MG/100ML
999 INJECTION, SOLUTION INTRAVENOUS CONTINUOUS PRN
Status: DISCONTINUED | OUTPATIENT
Start: 2017-11-07 | End: 2017-11-08 | Stop reason: HOSPADM

## 2017-11-07 RX ORDER — ASPIRIN 81 MG/1
81 TABLET ORAL DAILY
Status: DISCONTINUED | OUTPATIENT
Start: 2017-11-08 | End: 2017-11-08 | Stop reason: HOSPADM

## 2017-11-07 RX ORDER — PRENATAL VIT/IRON FUM/FOLIC AC 27MG-0.8MG
1 TABLET ORAL DAILY
Status: DISCONTINUED | OUTPATIENT
Start: 2017-11-08 | End: 2017-11-08 | Stop reason: HOSPADM

## 2017-11-07 RX ORDER — TERBUTALINE SULFATE 1 MG/ML
INJECTION, SOLUTION SUBCUTANEOUS
Status: COMPLETED
Start: 2017-11-07 | End: 2017-11-07

## 2017-11-07 RX ORDER — SODIUM CHLORIDE, SODIUM LACTATE, POTASSIUM CHLORIDE, CALCIUM CHLORIDE 600; 310; 30; 20 MG/100ML; MG/100ML; MG/100ML; MG/100ML
125 INJECTION, SOLUTION INTRAVENOUS CONTINUOUS
Status: DISCONTINUED | OUTPATIENT
Start: 2017-11-07 | End: 2017-11-08 | Stop reason: HOSPADM

## 2017-11-07 RX ORDER — TERBUTALINE SULFATE 1 MG/ML
0.25 INJECTION, SOLUTION SUBCUTANEOUS ONCE
Status: COMPLETED | OUTPATIENT
Start: 2017-11-08 | End: 2017-11-07

## 2017-11-07 RX ADMIN — SODIUM CHLORIDE, POTASSIUM CHLORIDE, SODIUM LACTATE AND CALCIUM CHLORIDE 999 ML/HR: 600; 310; 30; 20 INJECTION, SOLUTION INTRAVENOUS at 20:15

## 2017-11-07 RX ADMIN — TERBUTALINE SULFATE 0.25 MG: 1 INJECTION SUBCUTANEOUS at 23:56

## 2017-11-07 RX ADMIN — TERBUTALINE SULFATE 0.25 MG: 1 INJECTION, SOLUTION SUBCUTANEOUS at 23:56

## 2017-11-07 RX ADMIN — SODIUM CHLORIDE, POTASSIUM CHLORIDE, SODIUM LACTATE AND CALCIUM CHLORIDE 125 ML/HR: 600; 310; 30; 20 INJECTION, SOLUTION INTRAVENOUS at 21:12

## 2017-11-08 ENCOUNTER — APPOINTMENT (OUTPATIENT)
Dept: ULTRASOUND IMAGING | Facility: HOSPITAL | Age: 22
End: 2017-11-08

## 2017-11-08 VITALS
BODY MASS INDEX: 31.77 KG/M2 | SYSTOLIC BLOOD PRESSURE: 114 MMHG | HEART RATE: 90 BPM | DIASTOLIC BLOOD PRESSURE: 66 MMHG | RESPIRATION RATE: 18 BRPM | OXYGEN SATURATION: 100 % | HEIGHT: 66 IN | WEIGHT: 197.7 LBS | TEMPERATURE: 98.5 F

## 2017-11-08 LAB
CALCIUM 24H UR-MCNC: 9.8 MG/DL
CALCIUM 24H UR-MRATE: 294 MG/24 HR (ref 0–249)
COLLECT DURATION TIME UR: 24 HRS
CREAT CL 24H UR+SERPL-VRATE: 139 ML/MIN (ref 63–143)
CREAT CL 24H UR+SERPL-VRATE: 200.1 L/24 HR (ref 95–205.9)
CREAT UR-MCNC: 43.4 MG/DL
CREAT UR-MCNC: 44.5 MG/DL
CREATINE 24H UR-MRATE: 1.3 G/24 HR (ref 0.72–1.51)
CREATINE 24H UR-MRATE: 1.34 G/24 HR (ref 0.72–1.51)
HBA1C MFR BLD: 9.8 % (ref 4.5–5.7)
MICRO TOTAL PROTEIN: 7.9 MG/DL
MICROALBUMIN 24H MFR UR: 237 MG/24 HR (ref 0–29)
SPECIMEN VOL 24H UR: 3000 ML

## 2017-11-08 PROCEDURE — 82570 ASSAY OF URINE CREATININE: CPT | Performed by: OBSTETRICS & GYNECOLOGY

## 2017-11-08 PROCEDURE — 81050 URINALYSIS VOLUME MEASURE: CPT | Performed by: OBSTETRICS & GYNECOLOGY

## 2017-11-08 PROCEDURE — 99219 PR INITIAL OBSERVATION CARE/DAY 50 MINUTES: CPT | Performed by: PHYSICIAN ASSISTANT

## 2017-11-08 PROCEDURE — 83036 HEMOGLOBIN GLYCOSYLATED A1C: CPT | Performed by: PHYSICIAN ASSISTANT

## 2017-11-08 PROCEDURE — 76819 FETAL BIOPHYS PROFIL W/O NST: CPT

## 2017-11-08 PROCEDURE — 96374 THER/PROPH/DIAG INJ IV PUSH: CPT

## 2017-11-08 PROCEDURE — 82340 ASSAY OF CALCIUM IN URINE: CPT | Performed by: OBSTETRICS & GYNECOLOGY

## 2017-11-08 PROCEDURE — 25010000002 ONDANSETRON PER 1 MG

## 2017-11-08 PROCEDURE — 59025 FETAL NON-STRESS TEST: CPT

## 2017-11-08 PROCEDURE — 96361 HYDRATE IV INFUSION ADD-ON: CPT

## 2017-11-08 PROCEDURE — 82575 CREATININE CLEARANCE TEST: CPT | Performed by: OBSTETRICS & GYNECOLOGY

## 2017-11-08 PROCEDURE — 76819 FETAL BIOPHYS PROFIL W/O NST: CPT | Performed by: RADIOLOGY

## 2017-11-08 PROCEDURE — 63710000001 INSULIN DETEMIR PER 5 UNITS: Performed by: OBSTETRICS & GYNECOLOGY

## 2017-11-08 PROCEDURE — G0378 HOSPITAL OBSERVATION PER HR: HCPCS

## 2017-11-08 PROCEDURE — 84156 ASSAY OF PROTEIN URINE: CPT | Performed by: OBSTETRICS & GYNECOLOGY

## 2017-11-08 RX ORDER — ONDANSETRON 2 MG/ML
INJECTION INTRAMUSCULAR; INTRAVENOUS
Status: COMPLETED
Start: 2017-11-08 | End: 2017-11-08

## 2017-11-08 RX ORDER — ONDANSETRON 2 MG/ML
4 INJECTION INTRAMUSCULAR; INTRAVENOUS EVERY 6 HOURS PRN
Status: DISCONTINUED | OUTPATIENT
Start: 2017-11-08 | End: 2017-11-08 | Stop reason: HOSPADM

## 2017-11-08 RX ADMIN — INSULIN DETEMIR 60 UNITS: 100 INJECTION, SOLUTION SUBCUTANEOUS at 12:21

## 2017-11-08 RX ADMIN — ASPIRIN 81 MG: 81 TABLET ORAL at 10:00

## 2017-11-08 RX ADMIN — ONDANSETRON 4 MG: 2 INJECTION INTRAMUSCULAR; INTRAVENOUS at 01:49

## 2017-11-08 RX ADMIN — SODIUM CHLORIDE, POTASSIUM CHLORIDE, SODIUM LACTATE AND CALCIUM CHLORIDE 125 ML/HR: 600; 310; 30; 20 INJECTION, SOLUTION INTRAVENOUS at 03:22

## 2017-11-08 RX ADMIN — PRENATAL VIT W/ FE FUMARATE-FA TAB 27-0.8 MG 1 TABLET: 27-0.8 TAB at 11:29

## 2017-11-08 RX ADMIN — SODIUM CHLORIDE, POTASSIUM CHLORIDE, SODIUM LACTATE AND CALCIUM CHLORIDE 125 ML/HR: 600; 310; 30; 20 INJECTION, SOLUTION INTRAVENOUS at 11:28

## 2017-11-08 RX ADMIN — ONDANSETRON 4 MG: 2 INJECTION, SOLUTION INTRAMUSCULAR; INTRAVENOUS at 01:49

## 2017-11-08 NOTE — NON STRESS TEST
Emily Bagley, a  at 30w6d with an GAVINO of 2018, by Ultrasound, was seen at [facility] for a nonstress test.    NST FOR DIABETES          REACTIVE 17  Barby Flores RN    VERIFIED BY REMEDIOS PICKETT RN  17  [phone]

## 2017-11-08 NOTE — DISCHARGE SUMMARY
Discharge Summary    Admit Date: 11/7/2017  6:47 PM    Admit Diagnosis: Pregnancy [Z34.90]  Pregnancy [Z34.90]    Date of Discharge:  11/8/2017    Discharge Diagnosis: Same        Hospital Course  Patient is a 22 y.o. female, G3, . Patient was admitted last night secondary to abdominal pain. Patient doing better. Symptoms have improved. Patient tolerating a regular diet and ambulating without difficulty. BPP 8 of 8. NST reassuring. Cervix: No Change. Will discharge to home today.         Vital Signs  Temp:  [97.8 °F (36.6 °C)] 97.8 °F (36.6 °C)  Heart Rate:  [] 90  Resp:  [16-19] 16  BP: (107-129)/(66-82) 114/66    Review of Systems    The following systems were reviewed and negative;  ENT, respiratory, cardiovascular, gastrointestinal, genitourinary, breast, endocrine and allergies / immunologic.      Physical Exam:      General Appearance:    Alert, cooperative, in no acute distress   Head:    Normocephalic, without obvious abnormality, atraumatic   Eyes:            Lids and lashes normal, conjunctivae and sclerae normal, no   icterus, no pallor, corneas clear, PERRLA   Ears:    Ears appear intact with no abnormalities noted   Throat:   No oral lesions, no thrush, oral mucosa moist   Neck:   No adenopathy, supple, trachea midline, no thyromegaly, no     carotid bruit, no JVD   Back:     No kyphosis present, no scoliosis present, no skin lesions,       erythema or scars, no tenderness to percussion or                   palpation,   range of motion normal   Lungs:     Clear to auscultation,respirations regular, even and                   unlabored    Heart:    Regular rhythm and normal rate, normal S1 and S2, no            murmur, no gallop, no rub, no click   Breast Exam:    Deferred   Abdomen:     Normal bowel sounds, no masses, no organomegaly, soft        non-tender, non-distended, no guarding, no rebound                 tenderness   Genitalia:    Cervix: Dilated .5 cm, No Change   Extremities:    Moves all extremities well, no edema, no cyanosis, no              redness   Pulses:   Pulses palpable and equal bilaterally   Skin:   No bleeding, bruising or rash   Lymph nodes:   No palpable adenopathy   Neurologic:   Cranial nerves 2 - 12 grossly intact, sensation intact, DTR        present and equal bilaterally             Condition on Discharge:  Stable    Urine Output Good    Discharge Diet: Regular    Follow-up Appointments  Patient will follow up in clinic this week.        Jose Noel MD.   11/08/17  11:41 AM

## 2017-11-08 NOTE — NURSING NOTE
New admit to room 227 to the services of Dr. Calloway a 22y.o. G3,P1  @ 30 5/7 weeks gestation with complaints of abdominal pain, patient states that she is currently doing a 24 hour urine but denies any history of gestational hypertension or CHTN, there is documentation in patients chart that she had GHTN with her G2 pregnancy.

## 2017-11-08 NOTE — CONSULTS
[facility] HOSPITALIST CONSULT NOTE     Consults    Patient Identification:  Name:  Emily Bagley  Age:  22 y.o.  Sex:  female  :  1995  MRN:  [id number]  Visit Number:  [id number]  Primary care provider:  PANFILO Herrera    History of presenting illness:  Patient is a 23 yo female with IUP at 30w5d gestation that was admitted yesterday today per OBGYN for abdominal pain. Hospitalist services were consulted for hyperglycemia in setting of type I diabetes mellitus. Patient has been tracking her own glucose levels while inpatient, no record available. She states at home her blood glucose ranges in the low 200s at baseline. She is on insulin therapy with meal time doses and basal insulin dose at night. She states that she does have history of DKA, total 7-8 times in her life time most recent being one year ago where she presented to the hospital in DKA and fetal demise. Currently, she reports nausea without any further vomiting. No further abdominal pain. No other complaints or acute events. She denies any chest pain or pressure. No shortness of breath. No urinary symptoms, urgency, polyuria, or odorous urine. She does state she is followed by endocrinologist in [place], but is unsure when her next appointment is.   ---------------------------------------------------------------------------------------------------------------------  Review of Systems   Constitutional: Positive for fatigue. Negative for chills, diaphoresis and fever.   HENT: Negative for congestion, rhinorrhea, sinus pain, sinus pressure, sneezing and sore throat.    Eyes: Negative for discharge and visual disturbance.   Respiratory: Negative for cough, chest tightness, shortness of breath and wheezing.    Gastrointestinal: Positive for nausea. Negative for abdominal pain, constipation, diarrhea and vomiting.   Endocrine: Negative for cold intolerance, heat intolerance and polyuria.    Genitourinary: Negative for decreased urine volume, dysuria, frequency and urgency.   Musculoskeletal: Negative for arthralgias and myalgias.   Skin: Negative for rash and wound.   Allergic/Immunologic: Negative for environmental allergies and immunocompromised state.   Neurological: Negative for dizziness, weakness, numbness and headaches.   Hematological: Negative for adenopathy. Does not bruise/bleed easily.   Psychiatric/Behavioral: Negative for confusion and decreased concentration.      ---------------------------------------------------------------------------------------------------------------------   Past History:  Family History   Problem Relation Age of Onset   • Hypertension Father    • Cancer Maternal Grandfather    • Cancer Paternal Grandmother    • Arthritis Paternal Grandfather    • Heart attack Paternal Grandfather    • Hypertension Paternal Grandfather      Past Medical History:   Diagnosis Date   • Anxiety 2014    took meds briefly   • Diabetes mellitus 2000    Type 1   • Gestational hypertension 2016   • Hypertension 2016    With pregnancy (2017-pt denies previously recorded dx of [facility])   • Kidney failure 2016    R/T diabetes (8/10/2017- patient denies)    • Polyhydramnios      Past Surgical History:   Procedure Laterality Date   • FINGER/THUMB ARTHROPLASTY Left     age 9 yr   • FINGER/THUMB CLOSED REDUCTION Left 2004   • FINGER/THUMB CLOSED REDUCTION       Social History     Social History   • Marital status: Single     Spouse name: N/A   • Number of children: N/A   • Years of education: N/A     Social History Main Topics   • Smoking status: Never Smoker   • Smokeless tobacco: Never Used   • Alcohol use No   • Drug use: No   • Sexual activity: Yes     Partners: Male     Other Topics Concern   • None     Social History Narrative     ---------------------------------------------------------------------------------------------------------------------   Allergies:  Review of patient's allergies  indicates no known allergies.  ---------------------------------------------------------------------------------------------------------------------   Prior to Admission Medications     Prescriptions Last Dose Informant Patient Reported? Taking?    aspirin 81 MG EC tablet 11/7/2017  Yes Yes    Take 81 mg by mouth Daily.    insulin aspart (novoLOG FLEXPEN) 100 UNIT/ML solution pen-injector sc pen 11/7/2017 Self No Yes    Inject 30 Units under the skin 3 (Three) Times a Day With Meals.    Patient taking differently:  Inject 15-25 Units under the skin 4 (Four) Times a Day Before Meals & at Bedtime As Needed (bs). Sliding scale 1unit per 6 grams of carbs    insulin detemir (LEVEMIR) 100 UNIT/ML injection 11/7/2017  No Yes    Inject 60 Units under the skin Daily.    Patient taking differently:  Inject 60 Units under the skin Every Morning.    Prenatal Vit-Fe Fumarate-FA (PRENATAL VITAMIN 27-0.8) 27-0.8 MG tablet tablet 11/7/2017 Self Yes Yes    Take 1 tablet by mouth Daily.        [facility] Meds:    aspirin 81 mg Oral Daily   insulin detemir 60 Units Subcutaneous Q24H   prenatal vitamin 27-0.8 1 tablet Oral Daily       lactated ringers 999 mL/hr Last Rate: Stopped (11/07/17 2110)   lactated ringers 125 mL/hr Last Rate: 125 mL/hr (11/08/17 1128)     ---------------------------------------------------------------------------------------------------------------------   Vital Signs:  Temp:  [97.8 °F (36.6 °C)] 97.8 °F (36.6 °C)  Heart Rate:  [] 90  Resp:  [16-19] 16  BP: (107-129)/(66-82) 114/66  Last 3 weights    11/07/17  4629   Weight: 197 lb 11.2 oz (89.7 kg)     Body mass index is 31.91 kg/(m^2).  ---------------------------------------------------------------------------------------------------------------------   Physical exam:  Constitutional:  Well-developed and well-nourished.  No respiratory distress. Pregnant. Pleasant.      HENT:  Head: Normocephalic and atraumatic.  Mouth:  Moist mucous membranes.    Eyes:   Conjunctivae and EOM are normal.  Pupils are equal, round, and reactive to light.  No scleral icterus.    Neck:  Neck supple.  No JVD present.    Cardiovascular:  Normal rate, regular rhythm and normal heart sounds with no murmur.  Pulmonary/Chest:  No respiratory distress, no wheezes, no crackles, with normal breath sounds and good air movement.  Abdominal:  Gravid uterus. ega 30 weeks. Soft. Bowel sounds are normal.  No distension and no tenderness.   Musculoskeletal:  No edema, no tenderness, and no deformity.  No red or swollen joints anywhere.    Neurological:  Alert and oriented to person, place, and time.  No cranial nerve deficit.  No tongue deviation.  No facial droop.  No slurred speech.   Skin:  Skin is warm and dry. No rash noted.  No pallor.   Psychiatric:  Normal mood and affect.  Behavior is normal. Judgment and thought content normal.   ---------------------------------------------------------------------------------------------------------------------   Telemetry: Patient not currently on telemetry   ---------------------------------------------------------------------------------------------------------------------     Results from last 7 days  Lab Units 11/07/17  1917   WBC 10*3/mm3 11.04   HEMOGLOBIN g/dL 11.9*   HEMATOCRIT % 37.0   MCV fL 86.2   MCHC g/dL 32.2*   PLATELETS 10*3/mm3 247           Results from last 7 days  Lab Units 11/07/17 1917   SODIUM mmol/L 133*   POTASSIUM mmol/L 3.6   CHLORIDE mmol/L 106   CO2 mmol/L 15.9*   BUN mg/dL 9   CREATININE mg/dL 0.58   EGFR IF NONAFRICN AM mL/min/1.73 133   CALCIUM mg/dL 8.9   GLUCOSE mg/dL 127*   ALBUMIN g/dL 3.60   BILIRUBIN mg/dL 0.5   ALK PHOS U/L 81   AST (SGOT) U/L 21   ALT (SGPT) U/L 17   Estimated Creatinine Clearance: 174.6 mL/min (by C-G formula based on Cr of 0.58).  No results found for: AMMONIA          Lab Results   Component Value Date    HGBA1C 10.0 10/26/2017     Lab Results   Component Value Date    TSH 3.636 08/22/2017     FREET4 1.20 2017     Lab Results   Component Value Date    PREGTESTUR Negative 2017     Pain Management Panel     Pain Management Panel Latest Ref Rng & Units 2017    CREATININE UR mg/dL 44.5 43.4    AMPHETAMINES SCREEN, URINE Negative - -    BARBITURATES SCREEN Negative - -    BENZODIAZEPINE SCREEN, URINE Negative - -    BUPRENORPHINE Negative - -    COCAINE SCREEN, URINE Negative - -    METHADONE SCREEN, URINE Negative - -    METHAMPHETAMINE UR Negative - -        I have personally reviewed the above laboratory results.    ---------------------------------------------------------------------------------------------------------------------   Imaging Results (last 7 days)     Procedure Component Value Units Date/Time    US Fetal Biophysical Profile;Without Non-Stress Testing [[id number]] Collected:  17 1041     Updated:  17 1103    Narrative:       EXAMINATION: US FETAL BIOPHYSICAL PROFILE;WITHOUT NON-STRESS TESTING-      TECHNIQUE: Real-time transabdominal obstetrical ultrasound of the  maternal pelvis and a second or third trimester pregnancy with image  documentation.     CLINICAL INDICATION:     30 week gestation  labor      COMPARISON:    None available.     FINDINGS:         FETUS: Single living intrauterine gestation.  HEART RATE:  142 bpm  PRESENTATION: Cephalic  PLACENTA: Posterior  AMNIOTIC FLUID INDEX:  19.95 cm   ANATOMY: The visualized fetal anatomy, although limited, is  unremarkable.     BIOMETRICS:   GESTATIONAL AGE BY ULTRASOUND: 32 weeks, 3 days  CORD INSERTION: Unremarkable as visualized.  EFW: 2208 g +/-331.14 g     MATERNAL:  UTERUS: Unremarkable as visualized.  CERVIX: Not visualized or assessed.  FREE FLUID: No visible free fluid.  OTHER FINDINGS: No additional remarkable findings.     Biophysical profile score: 8/8       Impression:          1. Single living intrauterine gestation, approximately 32 weeks, 3 days  gestational age based on  biometrics.  2. YESENIA: 19.95 cm.  3. Fetal heart rate: 142 bpm.  4. Physical profile score: 8/8.  5. Obstetric follow-up as clinically indicated.     This report was finalized on 11/8/2017 10:43 AM by Dr. Brayan Baez MD.      I have personally reviewed the radiology report.  ---------------------------------------------------------------------------------------------------------------------     Assessment and Plan:   -Type I diabetes mellitus   -History of DKA  -30 week gestation   -Hypothyroidism   -Gestational hypertension   -Normocytic anemia likely pregnancy related   -Anxiety     Continue OB care and management per primary (OBGYN). Per chart review, patient is prescribed 30 units of aspart TID with meals, however inpatient she is only getting 15-25 units with meals PRN. I have started her back 30 units TID scheduled. HgbA1C level ordered and pending. Patient has been checking blood glucose on her own without any recording, thus I have scheduled accuchecks QAC and QHS. Will titrate insulin therapy as necessary. Patient does run in the 200s at baseline prior to admission, likely titrating up to prescribed dosage will improve glycemic control. Patient also on regular diet, I have placed her on consistent carbohydrate diet for better glycemic control and compliance. Patient does follow with endocrinologist in [place], [place]. Recommend patient follow up in 1-2 weeks post discharge with her history of DKA in pregnancy. Patient not currently in DKA. Will continue to follow along, don't hesitate to call for any questions or concerns.       Thank you for the consult, Hospitalist service will continue to follow.     JAVIER Gale  11/08/17  2:06 PM  ---------------------------------------------------------------------------------------------------------------------

## 2017-11-08 NOTE — NON STRESS TEST
Emily Bagley, a  at 30w5d with an GAVINO of 2018, by Ultrasound, was seen at [facility] LABOR DELIVERY for a nonstress test.    Chief Complaint   Patient presents with   • Abdominal Pain   • Headache       Interpretation A  Nonstress Test Interpretation A: Reactive (17 : Marilyn Fortune, RN)  Comments A: Verified by Michelle Elizalde RN (17 : Marilyn Fortune RN)

## 2017-11-08 NOTE — PLAN OF CARE
Problem: Patient Care Overview (Adult)  Goal: Plan of Care Review  Outcome: Ongoing (interventions implemented as appropriate)    11/08/17 0307   Coping/Psychosocial Response Interventions   Plan Of Care Reviewed With patient   Patient Care Overview   Progress progress toward functional goals as expected       Goal: Adult Individualization and Mutuality  Outcome: Ongoing (interventions implemented as appropriate)  Goal: Discharge Needs Assessment  Outcome: Ongoing (interventions implemented as appropriate)    11/08/17 0307   Discharge Needs Assessment   Concerns To Be Addressed no discharge needs identified   Readmission Within The Last 30 Days no previous admission in last 30 days   Equipment Needed After Discharge none   Current Health   Anticipated Changes Related to Illness none   Self-Care   Equipment Currently Used at Home none   Living Environment   Transportation Available car;family or friend will provide         Problem: Prenatal Inpatient (Adult,Obstetrics,Pediatric)  Intervention: Promote/Monitor Maternal/Fetal Well-being    11/07/17 2100 11/08/17 0000   Promote/Monitor Maternal/Fetal Well-being   Method --  palpation;per patient report;TOCO (external toco transducer)   Contraction Intensity --  mild by palpation  (patient does not feel these )   Uterine Resting Tone --  soft by palpation   Promote Fetal Well-being   Fetal Movement --  active   Fetal HR Assessment Method --  external   Fetal HR (Beats/Min) --  145   Fetal HR Baseline --  normal range (110-160 bpm)   Fetal HR Variability --  moderate (amplitude range 6 to 25 bpm)   Fetal HR Accelerations --  greater than/equal to10 bpm (32 wks gest or less);lasts at least 10 seconds (32 wks gest or less)   Fetal HR Decelerations --  absent   Sinusoidal Pattern Present --  absent   Positioning   Positioning semi-Fowlers --        Intervention: Prevent/Manage DVT/VTE Risk    11/08/17 0307   Support Surgical/Anesthesia Recovery   Venous Thromboembolism  Prevent/Manage intravenous hydration       Intervention: Support/Optimize Psychosocial Response to Acute Illness during Pregnancy    11/08/17 0307   Coping Strategies   Supportive Measures active listening utilized   Coping/Psychosocial Interventions   Parent/Child Attachment Promotion positive reinforcement provided         Goal: Signs and Symptoms of Listed Potential Problems Will be Absent or Manageable (Prenatal Inpatient)  Outcome: Ongoing (interventions implemented as appropriate)    11/08/17 0307   Prenatal Inpatient   Problems Assessed (Prenatal Patient) all   Problems Present (Prenatal Patient) none

## 2017-11-09 LAB — BACTERIA SPEC AEROBE CULT: NORMAL

## 2017-11-11 ENCOUNTER — HOSPITAL ENCOUNTER (OUTPATIENT)
Facility: HOSPITAL | Age: 22
Discharge: HOME | End: 2017-11-11
Attending: OBSTETRICS & GYNECOLOGY | Admitting: OBSTETRICS & GYNECOLOGY

## 2017-11-11 PROBLEM — Z36.89 NON-STRESS TEST REACTIVE: Status: ACTIVE | Noted: 2017-11-11

## 2017-11-11 PROCEDURE — 59025 FETAL NON-STRESS TEST: CPT

## 2017-11-11 NOTE — NON STRESS TEST
Emily Bagley, a  at 31w2d with an GAVINO of 2018, by Ultrasound, was seen at [facility] LABOR DELIVERY for a nonstress test.    Chief Complaint   Patient presents with   • Non-stress Test     IDDM, HX OF IUFD       Interpretation A  Nonstress Test Interpretation A: Reactive (17 1012 : Jessy Blackburn RN)  GLENNA COLLETT RN

## 2017-11-15 ENCOUNTER — HOSPITAL ENCOUNTER (OUTPATIENT)
Facility: HOSPITAL | Age: 22
Discharge: HOME | End: 2017-11-15
Attending: OBSTETRICS & GYNECOLOGY | Admitting: OBSTETRICS & GYNECOLOGY

## 2017-11-15 ENCOUNTER — HOSPITAL ENCOUNTER (OUTPATIENT)
Dept: LABOR AND DELIVERY | Facility: HOSPITAL | Age: 22
Discharge: HOME | End: 2017-11-15

## 2017-11-15 VITALS
BODY MASS INDEX: 32.95 KG/M2 | TEMPERATURE: 98.5 F | RESPIRATION RATE: 18 BRPM | HEART RATE: 78 BPM | WEIGHT: 205 LBS | DIASTOLIC BLOOD PRESSURE: 67 MMHG | SYSTOLIC BLOOD PRESSURE: 115 MMHG | HEIGHT: 66 IN

## 2017-11-15 PROBLEM — O24.913 DIABETES MELLITUS COMPLICATING PREGNANCY, ANTEPARTUM, THIRD TRIMESTER: Status: ACTIVE | Noted: 2017-11-15

## 2017-11-15 PROCEDURE — 59025 FETAL NON-STRESS TEST: CPT

## 2017-11-15 NOTE — NON STRESS TEST
Emily Bagley, a  at 31w6d with an GAVINO of 2018, by Ultrasound, was seen at [facility] LABOR DELIVERY for a nonstress test.    Chief Complaint   Patient presents with   • Non-stress Test     BIWEEKLY NST FOR TYPE 1 IDDM, DENIES CTX, VB OR LOF. STATES BABY IS MOVING WELL.        Interpretation A  Nonstress Test Interpretation A: Reactive (11/15/17 0945 : Beth Hussein RN)  Comments A: VERIFIED PER ALY WELLS RN.  (11/15/17 0945 : Beth Hussein RN)        FETAL NONSTRESS TEST REPORT      Gestational age: As recorded in hospital chart    Indication: See hospital chart    Accelerations: Present    Baseline fetal heart rate: 130    Decelerations: Few variables present    Conclusion: Reactive

## 2017-11-18 ENCOUNTER — HOSPITAL ENCOUNTER (OUTPATIENT)
Dept: LABOR AND DELIVERY | Facility: HOSPITAL | Age: 22
Discharge: HOME | End: 2017-11-18

## 2017-11-18 ENCOUNTER — HOSPITAL ENCOUNTER (OUTPATIENT)
Facility: HOSPITAL | Age: 22
Discharge: HOME | End: 2017-11-18
Attending: OBSTETRICS & GYNECOLOGY | Admitting: OBSTETRICS & GYNECOLOGY

## 2017-11-18 VITALS
HEART RATE: 116 BPM | RESPIRATION RATE: 18 BRPM | DIASTOLIC BLOOD PRESSURE: 76 MMHG | HEIGHT: 66 IN | WEIGHT: 205 LBS | BODY MASS INDEX: 32.95 KG/M2 | TEMPERATURE: 98.3 F | SYSTOLIC BLOOD PRESSURE: 120 MMHG

## 2017-11-18 PROCEDURE — 59025 FETAL NON-STRESS TEST: CPT

## 2017-11-18 NOTE — NON STRESS TEST
Emily Bagley, shari  at 32w2d with an GAVINO of 2018, by Ultrasound, was seen at [facility] LABOR DELIVERY for a nonstress test.    Chief Complaint   Patient presents with   • Non-stress Test     type 1 diabetes       Interpretation A  Nonstress Test Interpretation A: Reactive (17 0942 : Jessy Blackburn, RN)     Glenna collett rn        FETAL NONSTRESS TEST REPORT      Gestational age: As recorded in hospital chart    Indication: See hospital chart    Accelerations: Present    Baseline fetal heart rate: 150    Decelerations: Few variables present    Conclusion: Reactive

## 2017-11-22 ENCOUNTER — HOSPITAL ENCOUNTER (OUTPATIENT)
Facility: HOSPITAL | Age: 22
Discharge: HOME | End: 2017-11-22
Attending: OBSTETRICS & GYNECOLOGY | Admitting: OBSTETRICS & GYNECOLOGY

## 2017-11-22 ENCOUNTER — APPOINTMENT (OUTPATIENT)
Dept: ULTRASOUND IMAGING | Facility: HOSPITAL | Age: 22
End: 2017-11-22

## 2017-11-22 ENCOUNTER — TRANSCRIBE ORDERS (OUTPATIENT)
Dept: OBSTETRICS AND GYNECOLOGY | Facility: HOSPITAL | Age: 22
End: 2017-11-22

## 2017-11-22 VITALS
TEMPERATURE: 98.1 F | WEIGHT: 203 LBS | RESPIRATION RATE: 18 BRPM | DIASTOLIC BLOOD PRESSURE: 82 MMHG | HEIGHT: 66 IN | HEART RATE: 99 BPM | BODY MASS INDEX: 32.62 KG/M2 | SYSTOLIC BLOOD PRESSURE: 124 MMHG

## 2017-11-22 DIAGNOSIS — O40.3XX1 POLYHYDRAMNIOS IN THIRD TRIMESTER COMPLICATION, FETUS 1 OF MULTIPLE GESTATION: ICD-10-CM

## 2017-11-22 DIAGNOSIS — O10.913 PRE-EXISTING HYPERTENSION COMPLICATING PREGNANCY IN THIRD TRIMESTER: ICD-10-CM

## 2017-11-22 DIAGNOSIS — O24.013 PRE-EXISTING TYPE 1 DIABETES MELLITUS IN PREGNANCY IN THIRD TRIMESTER: Primary | ICD-10-CM

## 2017-11-22 PROBLEM — O24.919 DM (DIABETES MELLITUS) IN PREGNANCY: Status: ACTIVE | Noted: 2017-11-22

## 2017-11-22 PROCEDURE — 76819 FETAL BIOPHYS PROFIL W/O NST: CPT

## 2017-11-22 PROCEDURE — G0463 HOSPITAL OUTPT CLINIC VISIT: HCPCS

## 2017-11-22 PROCEDURE — 76819 FETAL BIOPHYS PROFIL W/O NST: CPT | Performed by: RADIOLOGY

## 2017-11-22 PROCEDURE — 59025 FETAL NON-STRESS TEST: CPT

## 2017-11-25 ENCOUNTER — HOSPITAL ENCOUNTER (OUTPATIENT)
Dept: LABOR AND DELIVERY | Facility: HOSPITAL | Age: 22
Discharge: HOME | End: 2017-11-25

## 2017-11-25 ENCOUNTER — HOSPITAL ENCOUNTER (OUTPATIENT)
Facility: HOSPITAL | Age: 22
Discharge: HOME | End: 2017-11-25
Attending: OBSTETRICS & GYNECOLOGY | Admitting: OBSTETRICS & GYNECOLOGY

## 2017-11-25 VITALS
BODY MASS INDEX: 32.78 KG/M2 | TEMPERATURE: 98.3 F | DIASTOLIC BLOOD PRESSURE: 81 MMHG | RESPIRATION RATE: 18 BRPM | HEIGHT: 66 IN | HEART RATE: 93 BPM | WEIGHT: 204 LBS | SYSTOLIC BLOOD PRESSURE: 134 MMHG

## 2017-11-25 PROCEDURE — 59025 FETAL NON-STRESS TEST: CPT

## 2017-11-25 NOTE — NON STRESS TEST
Emily Bagley, a  at 33w2d with an GAVINO of 2018, by Ultrasound, was seen at [facility] LABOR DELIVERY for a nonstress test.    Chief Complaint   Patient presents with   • Non-stress Test     hx of iufd, IDDM                Moni Chandler RN

## 2017-11-27 ENCOUNTER — OFFICE VISIT (OUTPATIENT)
Dept: OBSTETRICS AND GYNECOLOGY | Facility: HOSPITAL | Age: 22
End: 2017-11-27

## 2017-11-27 ENCOUNTER — HOSPITAL ENCOUNTER (OUTPATIENT)
Dept: WOMENS IMAGING | Facility: HOSPITAL | Age: 22
Discharge: HOME | End: 2017-11-27
Admitting: OBSTETRICS & GYNECOLOGY

## 2017-11-27 ENCOUNTER — OFFICE VISIT (OUTPATIENT)
Dept: ENDOCRINOLOGY | Facility: CLINIC | Age: 22
End: 2017-11-27

## 2017-11-27 VITALS — BODY MASS INDEX: 33.25 KG/M2 | SYSTOLIC BLOOD PRESSURE: 122 MMHG | WEIGHT: 206 LBS | DIASTOLIC BLOOD PRESSURE: 88 MMHG

## 2017-11-27 VITALS
HEIGHT: 66 IN | SYSTOLIC BLOOD PRESSURE: 104 MMHG | HEART RATE: 88 BPM | BODY MASS INDEX: 32.95 KG/M2 | OXYGEN SATURATION: 100 % | DIASTOLIC BLOOD PRESSURE: 64 MMHG | WEIGHT: 205 LBS

## 2017-11-27 DIAGNOSIS — O24.013 PRE-EXISTING TYPE 1 DIABETES MELLITUS DURING PREGNANCY IN THIRD TRIMESTER: Primary | ICD-10-CM

## 2017-11-27 DIAGNOSIS — Z3A.33 33 WEEKS GESTATION OF PREGNANCY: ICD-10-CM

## 2017-11-27 DIAGNOSIS — O24.013 PRE-EXISTING TYPE 1 DIABETES MELLITUS IN PREGNANCY IN THIRD TRIMESTER: ICD-10-CM

## 2017-11-27 DIAGNOSIS — O10.913 PRE-EXISTING HYPERTENSION COMPLICATING PREGNANCY IN THIRD TRIMESTER: ICD-10-CM

## 2017-11-27 DIAGNOSIS — O40.3XX0 POLYHYDRAMNIOS IN THIRD TRIMESTER COMPLICATION, SINGLE OR UNSPECIFIED FETUS: ICD-10-CM

## 2017-11-27 DIAGNOSIS — O40.3XX1 POLYHYDRAMNIOS IN THIRD TRIMESTER COMPLICATION, FETUS 1 OF MULTIPLE GESTATION: ICD-10-CM

## 2017-11-27 LAB
BILIRUB BLD-MCNC: NEGATIVE MG/DL
CLARITY, POC: CLEAR
COLOR UR: ABNORMAL
GLUCOSE BLDC GLUCOMTR-MCNC: 84 MG/DL (ref 70–130)
GLUCOSE UR STRIP-MCNC: ABNORMAL MG/DL
HBA1C MFR BLD: 9.1 %
KETONES UR QL: NEGATIVE
LEUKOCYTE EST, POC: NEGATIVE
NITRITE UR-MCNC: NEGATIVE MG/ML
PH UR: 6 [PH] (ref 5–8)
PROT UR STRIP-MCNC: NEGATIVE MG/DL
RBC # UR STRIP: NEGATIVE /UL
SP GR UR: 1.01 (ref 1–1.03)
UROBILINOGEN UR QL: NORMAL

## 2017-11-27 PROCEDURE — 99213 OFFICE O/P EST LOW 20 MIN: CPT | Performed by: INTERNAL MEDICINE

## 2017-11-27 PROCEDURE — 83036 HEMOGLOBIN GLYCOSYLATED A1C: CPT | Performed by: INTERNAL MEDICINE

## 2017-11-27 PROCEDURE — 81002 URINALYSIS NONAUTO W/O SCOPE: CPT | Performed by: OBSTETRICS & GYNECOLOGY

## 2017-11-27 PROCEDURE — 82947 ASSAY GLUCOSE BLOOD QUANT: CPT | Performed by: INTERNAL MEDICINE

## 2017-11-27 PROCEDURE — 76819 FETAL BIOPHYS PROFIL W/O NST: CPT | Performed by: OBSTETRICS & GYNECOLOGY

## 2017-11-27 PROCEDURE — 76816 OB US FOLLOW-UP PER FETUS: CPT | Performed by: OBSTETRICS & GYNECOLOGY

## 2017-11-27 RX ORDER — LORATADINE 10 MG/1
1 CAPSULE, LIQUID FILLED ORAL DAILY
COMMUNITY
End: 2017-12-19 | Stop reason: HOSPADM

## 2017-11-27 NOTE — PROGRESS NOTES
Emily Bagley 22 y.o.  CC:Follow-up and Diabetes (Type I with pregnancy, GAVINO 1- OB: Dr Rodriguez in [facility])    Telida: Follow-up and Diabetes (Type I with pregnancy, GAVINO 1- OB: Dr Rodriguez in [facility])    Blood sugar and 90 day average sugar reviewed -   Results for orders placed or performed in visit on 11/27/17   POC Glycosylated Hemoglobin (Hb A1C)   Result Value Ref Range    Hemoglobin A1C 9.1 %   POC Glucose Fingerstick   Result Value Ref Range    Glucose 84 70 - 130 mg/dL     Average sugar is 210  Fasting sugar   Pp lunch 200-300  Pp dinner 104-284  Is titrating lantus - current dose 70 units  Discussed dividing dose and go to 36 units twice daily  occ low - is sensing low sugar well  Is taking 20-30 units of insulin prior to meals  Baby already 8 lbs (at 33 weeks)  Discussed goals fasting sugar less than 95 and goal pp sugar less than 120  Recommended she find some meals that had predictable carb amounts and for which she knew the insulin dose would result in a normal blood sugar and stick with those until delivery   Cautioned her about risk of higher sugars including but not limited to macrosomia, fetal lung immaturity, and IUFD/stillbirth, low blood sugar pp   She is not emailing sugars and we discussed the importance of adjusting insulin more often either individually or with my help to achieve better sugar control     No Known Allergies    Current Outpatient Prescriptions:   •  aspirin 81 MG EC tablet, Take 81 mg by mouth Daily., Disp: , Rfl:   •  insulin aspart (novoLOG FLEXPEN) 100 UNIT/ML solution pen-injector sc pen, Inject 30 Units under the skin 3 (Three) Times a Day With Meals. (Patient taking differently: Inject 25-35 Units under the skin 4 (Four) Times a Day Before Meals & at Bedtime As Needed (bs). Sliding scale 1unit per 6 grams of carbs), Disp: 30 mL, Rfl: 5  •  insulin detemir (LEVEMIR) 100 UNIT/ML injection, Inject 60 Units under the skin Daily. (Patient taking differently:  Inject 70 Units under the skin Every Morning.), Disp: 30 mL, Rfl: 5  •  Loratadine (CLARITIN) 10 MG capsule, Take 1 tablet/day by mouth As Needed., Disp: , Rfl:   •  Prenatal Vit-Fe Fumarate-FA (PRENATAL VITAMIN 27-0.8) 27-0.8 MG tablet tablet, Take 1 tablet by mouth Daily., Disp: , Rfl:   Patient Active Problem List    Diagnosis   • DM (diabetes mellitus) in pregnancy [O24.919]   • Diabetes mellitus complicating pregnancy, antepartum, third trimester [O24.913]   • Non-stress test reactive [Z36.89]   • Pregnancy [Z34.90]   • IDDM (insulin dependent diabetes mellitus) [E11.9, Z79.4]   • Endometritis [N71.9]   • PIH (pregnancy induced hypertension) [O13.9]   • DKA (diabetic ketoacidoses) [E13.10]   • Intrauterine fetal death in pregnancy, s/p induction, spontaneous vaginal delivery 10/9/16, Laborist Dr Juan [O36.4XX0]   • Lactic acidosis [E87.2]   • N&V (nausea and vomiting) [R11.2]   • Hypothyroid [E03.9]   • Pre-existing type 1 diabetes mellitus during pregnancy in third trimester [O24.013]   • Polyhydramnios [O40.9XX0]   • Chronic hypertension in pregnancy [O10.919]   • Hypertension [I10]     Overview Note:     With pregnancy     • Diabetes education, encounter for [Z71.89]     Overview Note:     Type 1       Review of Systems   Constitutional: Negative for activity change, appetite change, chills, diaphoresis, fatigue, fever and unexpected weight change.   HENT: Negative for congestion, dental problem, drooling, ear discharge, ear pain, facial swelling, hearing loss, mouth sores, nosebleeds, postnasal drip, rhinorrhea, sinus pressure, sneezing, sore throat, tinnitus, trouble swallowing and voice change.    Eyes: Negative for photophobia, pain, discharge, redness, itching and visual disturbance.   Respiratory: Negative for apnea, cough, choking, chest tightness, shortness of breath, wheezing and stridor.    Cardiovascular: Negative for chest pain, palpitations and leg swelling.   Gastrointestinal: Negative for  "abdominal distention, abdominal pain, anal bleeding, blood in stool, constipation, diarrhea, nausea, rectal pain and vomiting.   Endocrine: Negative for cold intolerance, heat intolerance, polydipsia, polyphagia and polyuria.   Genitourinary: Negative for decreased urine volume, difficulty urinating, dysuria, enuresis, flank pain, frequency, genital sores, hematuria and urgency.   Musculoskeletal: Negative for arthralgias, back pain, gait problem, joint swelling, myalgias, neck pain and neck stiffness.   Skin: Negative for color change, pallor, rash and wound.   Allergic/Immunologic: Negative for environmental allergies, food allergies and immunocompromised state.   Neurological: Negative for dizziness, tremors, seizures, syncope, facial asymmetry, speech difficulty, weakness, light-headedness, numbness and headaches.   Hematological: Negative for adenopathy. Does not bruise/bleed easily.   Psychiatric/Behavioral: Negative for agitation, behavioral problems, confusion, decreased concentration, dysphoric mood, hallucinations, self-injury, sleep disturbance and suicidal ideas. The patient is not nervous/anxious and is not hyperactive.      Social History     Social History   • Marital status: Single     Spouse name: N/A   • Number of children: N/A   • Years of education: N/A     Occupational History   • Not on file.     Social History Main Topics   • Smoking status: Never Smoker   • Smokeless tobacco: Never Used   • Alcohol use No   • Drug use: No   • Sexual activity: Yes     Partners: Male     Other Topics Concern   • Not on file     Social History Narrative     Family History   Problem Relation Age of Onset   • Hypertension Father    • Cancer Maternal Grandfather    • Cancer Paternal Grandmother    • Arthritis Paternal Grandfather    • Heart attack Paternal Grandfather    • Hypertension Paternal Grandfather      /64  Pulse 88  Ht 66\" (167.6 cm)  Wt 205 lb (93 kg)  LMP 04/04/2017  SpO2 100%  BMI 33.09 " kg/m2  Physical Exam   Constitutional: She is oriented to person, place, and time. She appears well-developed and well-nourished.   HENT:   Head: Normocephalic and atraumatic.   Nose: Nose normal.   Mouth/Throat: Oropharynx is clear and moist.   Eyes: Conjunctivae, EOM and lids are normal. Pupils are equal, round, and reactive to light.   Neck: Trachea normal and normal range of motion. Neck supple. Carotid bruit is not present. No tracheal deviation present. No thyroid mass and no thyromegaly present.   Cardiovascular: Normal rate, regular rhythm, normal heart sounds and intact distal pulses.  Exam reveals no gallop and no friction rub.    No murmur heard.  Pulmonary/Chest: Effort normal and breath sounds normal. No respiratory distress. She has no wheezes.   Musculoskeletal: Normal range of motion. She exhibits no edema or deformity.   Lymphadenopathy:     She has no cervical adenopathy.   Neurological: She is alert and oriented to person, place, and time. She has normal reflexes. She displays normal reflexes. No cranial nerve deficit.   Skin: Skin is warm and dry. No rash noted. No cyanosis or erythema. Nails show no clubbing.   Psychiatric: She has a normal mood and affect. Her speech is normal and behavior is normal. Judgment and thought content normal. Cognition and memory are normal.   Nursing note and vitals reviewed.    Results for orders placed or performed in visit on 11/27/17   POC Urinalysis Dipstick   Result Value Ref Range    Color Seda Yellow, Straw, Dark Yellow, Seda    Clarity, UA Clear Clear    Glucose, UA 2000 mg/dL (A) Negative, 1000 mg/dL (3+) mg/dL    Bilirubin Negative Negative    Ketones, UA Negative Negative    Specific Gravity  1.010 1.005 - 1.030    Blood, UA Negative Negative    pH, Urine 6.0 5.0 - 8.0    Protein, POC Negative Negative mg/dL    Urobilinogen, UA Normal Normal    Leukocytes Negative Negative    Nitrite, UA Negative Negative     Emily was seen today for follow-up and  diabetes.    Diagnoses and all orders for this visit:    Pre-existing type 1 diabetes mellitus during pregnancy in third trimester  -     POC Glycosylated Hemoglobin (Hb A1C)  -     POC Glucose Fingerstick      Problem List Items Addressed This Visit        Endocrine    Pre-existing type 1 diabetes mellitus during pregnancy in third trimester - Primary     Uncontrolled IDDM- blood sugar and 90 day average sugar reviewed  Results for orders placed or performed in visit on 11/27/17   POC Glycosylated Hemoglobin (Hb A1C)   Result Value Ref Range    Hemoglobin A1C 9.1 %   POC Glucose Fingerstick   Result Value Ref Range    Glucose 84 70 - 130 mg/dL     Discussed higher sugars- asked her to split lantus to 36 units twice daily and change insulin to carb to 1:5, correction 1:10  email sugars in 2-3 days and we will continue to try to adjust   Cautioned her about nighttime correction   Recommended she try to have standard/known carb meals until delivery   Risk to her and baby discussed with her and her   Baby lga with signs of accelerated growth          Relevant Orders    POC Glycosylated Hemoglobin (Hb A1C) (Completed)    POC Glucose Fingerstick (Completed)        Return in about 3 weeks (around 12/18/2017) for Recheck 30 min .    Crystal Reynolds MA  Signed Ne Kerr MD    ]

## 2017-11-27 NOTE — PROGRESS NOTES
Pt doing twice weekly NSTs in [place].  Fasting blood sugars around 130, after meals 170-180.  Pt did not bring blood sugar log with her today.

## 2017-11-28 NOTE — ASSESSMENT & PLAN NOTE
Uncontrolled IDDM- blood sugar and 90 day average sugar reviewed  Results for orders placed or performed in visit on 11/27/17   POC Glycosylated Hemoglobin (Hb A1C)   Result Value Ref Range    Hemoglobin A1C 9.1 %   POC Glucose Fingerstick   Result Value Ref Range    Glucose 84 70 - 130 mg/dL     Discussed higher sugars- asked her to split lantus to 36 units twice daily and change insulin to carb to 1:5, correction 1:10  email sugars in 2-3 days and we will continue to try to adjust   Cautioned her about nighttime correction   Recommended she try to have standard/known carb meals until delivery   Risk to her and baby discussed with her and her   Baby lga with signs of accelerated growth

## 2017-11-29 ENCOUNTER — HOSPITAL ENCOUNTER (INPATIENT)
Facility: HOSPITAL | Age: 22
LOS: 1 days | Discharge: ANOTHER HOSPITAL | End: 2017-11-30
Attending: OBSTETRICS & GYNECOLOGY | Admitting: OBSTETRICS & GYNECOLOGY

## 2017-11-29 ENCOUNTER — HOSPITAL ENCOUNTER (OUTPATIENT)
Facility: HOSPITAL | Age: 22
Discharge: HOME | End: 2017-11-29
Attending: OBSTETRICS & GYNECOLOGY | Admitting: OBSTETRICS & GYNECOLOGY

## 2017-11-29 ENCOUNTER — HOSPITAL ENCOUNTER (OUTPATIENT)
Dept: LABOR AND DELIVERY | Facility: HOSPITAL | Age: 22
Discharge: HOME | End: 2017-11-29

## 2017-11-29 VITALS
DIASTOLIC BLOOD PRESSURE: 88 MMHG | HEART RATE: 102 BPM | BODY MASS INDEX: 33.27 KG/M2 | SYSTOLIC BLOOD PRESSURE: 144 MMHG | TEMPERATURE: 98.6 F | RESPIRATION RATE: 18 BRPM | WEIGHT: 207 LBS | HEIGHT: 66 IN

## 2017-11-29 PROBLEM — O24.919 DIABETES MELLITUS IN PREGNANCY: Status: ACTIVE | Noted: 2017-11-29

## 2017-11-29 LAB
A1 MICROGLOB PLACENTAL VAG QL: NEGATIVE
ALBUMIN SERPL-MCNC: 3.6 G/DL (ref 3.5–5)
ALBUMIN/GLOB SERPL: 1.2 G/DL (ref 1.5–2.5)
ALP SERPL-CCNC: 101 U/L (ref 35–104)
ALT SERPL W P-5'-P-CCNC: 12 U/L (ref 10–36)
ANION GAP SERPL CALCULATED.3IONS-SCNC: ABNORMAL MMOL/L (ref 3.6–11.2)
AST SERPL-CCNC: 14 U/L (ref 10–30)
BACTERIA UR QL AUTO: ABNORMAL /HPF
BASOPHILS # BLD AUTO: 0.03 10*3/MM3 (ref 0–0.3)
BASOPHILS NFR BLD AUTO: 0.3 % (ref 0–2)
BILIRUB SERPL-MCNC: 0.6 MG/DL (ref 0.2–1.8)
BILIRUB UR QL STRIP: NEGATIVE
BUN BLD-MCNC: 8 MG/DL (ref 7–21)
BUN/CREAT SERPL: 13.1 (ref 7–25)
CALCIUM SPEC-SCNC: 8.9 MG/DL (ref 7.7–10)
CHLORIDE SERPL-SCNC: 106 MMOL/L (ref 99–112)
CLARITY UR: CLEAR
CO2 SERPL-SCNC: <10 MMOL/L (ref 24.3–31.9)
COLOR UR: YELLOW
CREAT BLD-MCNC: 0.61 MG/DL (ref 0.43–1.29)
DEPRECATED RDW RBC AUTO: 46.1 FL (ref 37–54)
EOSINOPHIL # BLD AUTO: 0 10*3/MM3 (ref 0–0.7)
EOSINOPHIL NFR BLD AUTO: 0 % (ref 0–5)
ERYTHROCYTE [DISTWIDTH] IN BLOOD BY AUTOMATED COUNT: 14.9 % (ref 11.5–14.5)
GFR SERPL CREATININE-BSD FRML MDRD: 125 ML/MIN/1.73
GLOBULIN UR ELPH-MCNC: 3.1 GM/DL
GLUCOSE BLD-MCNC: 296 MG/DL (ref 70–110)
GLUCOSE BLDC GLUCOMTR-MCNC: 292 MG/DL (ref 70–130)
GLUCOSE BLDC GLUCOMTR-MCNC: 334 MG/DL (ref 70–130)
GLUCOSE UR STRIP-MCNC: ABNORMAL MG/DL
HCT VFR BLD AUTO: 37 % (ref 37–47)
HGB BLD-MCNC: 11.3 G/DL (ref 12–16)
HGB UR QL STRIP.AUTO: ABNORMAL
HYALINE CASTS UR QL AUTO: ABNORMAL /LPF
IMM GRANULOCYTES # BLD: 0.08 10*3/MM3 (ref 0–0.03)
IMM GRANULOCYTES NFR BLD: 0.7 % (ref 0–0.5)
KETONES UR QL STRIP: ABNORMAL
LEUKOCYTE ESTERASE UR QL STRIP.AUTO: NEGATIVE
LYMPHOCYTES # BLD AUTO: 1.36 10*3/MM3 (ref 1–3)
LYMPHOCYTES NFR BLD AUTO: 11.8 % (ref 21–51)
MCH RBC QN AUTO: 26.3 PG (ref 27–33)
MCHC RBC AUTO-ENTMCNC: 30.5 G/DL (ref 33–37)
MCV RBC AUTO: 86.2 FL (ref 80–94)
MONOCYTES # BLD AUTO: 0.76 10*3/MM3 (ref 0.1–0.9)
MONOCYTES NFR BLD AUTO: 6.6 % (ref 0–10)
NEUTROPHILS # BLD AUTO: 9.34 10*3/MM3 (ref 1.4–6.5)
NEUTROPHILS NFR BLD AUTO: 80.6 % (ref 30–70)
NITRITE UR QL STRIP: NEGATIVE
OSMOLALITY SERPL CALC.SUM OF ELEC: 275.7 MOSM/KG (ref 273–305)
PH UR STRIP.AUTO: <=5 [PH] (ref 5–8)
PLATELET # BLD AUTO: 206 10*3/MM3 (ref 130–400)
PMV BLD AUTO: 12.5 FL (ref 6–10)
POTASSIUM BLD-SCNC: 4.3 MMOL/L (ref 3.5–5.3)
PROT SERPL-MCNC: 6.7 G/DL (ref 6–8)
PROT UR QL STRIP: NEGATIVE
RBC # BLD AUTO: 4.29 10*6/MM3 (ref 4.2–5.4)
RBC # UR: ABNORMAL /HPF
REF LAB TEST METHOD: ABNORMAL
SODIUM BLD-SCNC: 133 MMOL/L (ref 135–153)
SP GR UR STRIP: >=1.03 (ref 1–1.03)
SQUAMOUS #/AREA URNS HPF: ABNORMAL /HPF
UROBILINOGEN UR QL STRIP: ABNORMAL
WBC NRBC COR # BLD: 11.57 10*3/MM3 (ref 4.5–12.5)
WBC UR QL AUTO: ABNORMAL /HPF

## 2017-11-29 PROCEDURE — 59025 FETAL NON-STRESS TEST: CPT

## 2017-11-29 PROCEDURE — 83050 HGB METHEMOGLOBIN QUAN: CPT | Performed by: HOSPITALIST

## 2017-11-29 PROCEDURE — 82805 BLOOD GASES W/O2 SATURATION: CPT | Performed by: HOSPITALIST

## 2017-11-29 PROCEDURE — 82375 ASSAY CARBOXYHB QUANT: CPT | Performed by: HOSPITALIST

## 2017-11-29 PROCEDURE — 63710000001 INSULIN ASPART PER 5 UNITS: Performed by: HOSPITALIST

## 2017-11-29 PROCEDURE — 36600 WITHDRAWAL OF ARTERIAL BLOOD: CPT | Performed by: HOSPITALIST

## 2017-11-29 PROCEDURE — 84112 EVAL AMNIOTIC FLUID PROTEIN: CPT | Performed by: OBSTETRICS & GYNECOLOGY

## 2017-11-29 PROCEDURE — 81001 URINALYSIS AUTO W/SCOPE: CPT | Performed by: HOSPITALIST

## 2017-11-29 PROCEDURE — 85025 COMPLETE CBC W/AUTO DIFF WBC: CPT | Performed by: HOSPITALIST

## 2017-11-29 PROCEDURE — 82962 GLUCOSE BLOOD TEST: CPT

## 2017-11-29 PROCEDURE — 36415 COLL VENOUS BLD VENIPUNCTURE: CPT | Performed by: HOSPITALIST

## 2017-11-29 PROCEDURE — 80053 COMPREHEN METABOLIC PANEL: CPT | Performed by: HOSPITALIST

## 2017-11-29 RX ORDER — DEXTROSE MONOHYDRATE 25 G/50ML
25 INJECTION, SOLUTION INTRAVENOUS
Status: DISCONTINUED | OUTPATIENT
Start: 2017-11-29 | End: 2017-11-30 | Stop reason: HOSPADM

## 2017-11-29 RX ORDER — TERBUTALINE SULFATE 1 MG/ML
0.25 INJECTION, SOLUTION SUBCUTANEOUS ONCE
Status: DISCONTINUED | OUTPATIENT
Start: 2017-11-29 | End: 2017-11-29 | Stop reason: SDUPTHER

## 2017-11-29 RX ORDER — SODIUM CHLORIDE 9 MG/ML
125 INJECTION, SOLUTION INTRAVENOUS CONTINUOUS
Status: DISCONTINUED | OUTPATIENT
Start: 2017-11-29 | End: 2017-11-30

## 2017-11-29 RX ORDER — SODIUM CHLORIDE 0.9 % (FLUSH) 0.9 %
1-10 SYRINGE (ML) INJECTION AS NEEDED
Status: DISCONTINUED | OUTPATIENT
Start: 2017-11-29 | End: 2017-11-30 | Stop reason: HOSPADM

## 2017-11-29 RX ORDER — TERBUTALINE SULFATE 1 MG/ML
INJECTION, SOLUTION SUBCUTANEOUS
Status: COMPLETED
Start: 2017-11-29 | End: 2017-11-29

## 2017-11-29 RX ORDER — PRENATAL VIT/IRON FUM/FOLIC AC 27MG-0.8MG
1 TABLET ORAL DAILY
Status: DISCONTINUED | OUTPATIENT
Start: 2017-11-30 | End: 2017-11-30 | Stop reason: HOSPADM

## 2017-11-29 RX ORDER — CALCIUM CARBONATE 200(500)MG
2 TABLET,CHEWABLE ORAL 3 TIMES DAILY PRN
Status: DISCONTINUED | OUTPATIENT
Start: 2017-11-29 | End: 2017-11-30 | Stop reason: HOSPADM

## 2017-11-29 RX ORDER — TERBUTALINE SULFATE 1 MG/ML
0.25 INJECTION, SOLUTION SUBCUTANEOUS ONCE
Status: COMPLETED | OUTPATIENT
Start: 2017-11-29 | End: 2017-11-29

## 2017-11-29 RX ORDER — CETIRIZINE HYDROCHLORIDE 10 MG/1
10 TABLET ORAL DAILY
Status: DISCONTINUED | OUTPATIENT
Start: 2017-11-30 | End: 2017-11-30 | Stop reason: HOSPADM

## 2017-11-29 RX ORDER — NICOTINE POLACRILEX 4 MG
15 LOZENGE BUCCAL
Status: DISCONTINUED | OUTPATIENT
Start: 2017-11-29 | End: 2017-11-30 | Stop reason: HOSPADM

## 2017-11-29 RX ORDER — ASPIRIN 81 MG/1
81 TABLET ORAL DAILY
Status: DISCONTINUED | OUTPATIENT
Start: 2017-11-30 | End: 2017-11-30 | Stop reason: HOSPADM

## 2017-11-29 RX ADMIN — TERBUTALINE SULFATE 0.25 MG: 1 INJECTION SUBCUTANEOUS at 10:40

## 2017-11-29 RX ADMIN — SODIUM CHLORIDE 125 ML/HR: 9 INJECTION, SOLUTION INTRAVENOUS at 22:12

## 2017-11-29 RX ADMIN — Medication 2 TABLET: at 22:13

## 2017-11-29 RX ADMIN — TERBUTALINE SULFATE 0.25 MG: 1 INJECTION SUBCUTANEOUS at 12:58

## 2017-11-29 RX ADMIN — INSULIN ASPART 6 UNITS: 100 INJECTION, SOLUTION INTRAVENOUS; SUBCUTANEOUS at 22:51

## 2017-11-30 ENCOUNTER — HOSPITAL ENCOUNTER (INPATIENT)
Facility: HOSPITAL | Age: 22
LOS: 5 days | Discharge: HOME | End: 2017-12-05
Attending: OBSTETRICS & GYNECOLOGY | Admitting: OBSTETRICS & GYNECOLOGY

## 2017-11-30 VITALS
TEMPERATURE: 97.9 F | WEIGHT: 207 LBS | SYSTOLIC BLOOD PRESSURE: 116 MMHG | DIASTOLIC BLOOD PRESSURE: 83 MMHG | BODY MASS INDEX: 33.27 KG/M2 | OXYGEN SATURATION: 100 % | HEART RATE: 82 BPM | HEIGHT: 66 IN | RESPIRATION RATE: 20 BRPM

## 2017-11-30 DIAGNOSIS — O24.013 PRE-EXISTING TYPE 1 DIABETES MELLITUS DURING PREGNANCY IN THIRD TRIMESTER: Primary | ICD-10-CM

## 2017-11-30 LAB
A-A DO2: 73.8 MMHG (ref 0–300)
ABO GROUP BLD: NORMAL
ABO GROUP BLD: NORMAL
ACETONE BLD QL: ABNORMAL
ALBUMIN SERPL-MCNC: 3.9 G/DL (ref 3.2–4.8)
ALBUMIN/GLOB SERPL: 1.3 G/DL (ref 1.5–2.5)
ALP SERPL-CCNC: 109 U/L (ref 25–100)
ALT SERPL W P-5'-P-CCNC: 10 U/L (ref 7–40)
AMPHET+METHAMPHET UR QL: NEGATIVE
AMPHETAMINES UR QL: NEGATIVE
ANION GAP SERPL CALCULATED.3IONS-SCNC: 10.9 MMOL/L (ref 3.6–11.2)
ANION GAP SERPL CALCULATED.3IONS-SCNC: 12 MMOL/L (ref 3–11)
ANION GAP SERPL CALCULATED.3IONS-SCNC: 12.7 MMOL/L (ref 3.6–11.2)
ANION GAP SERPL CALCULATED.3IONS-SCNC: ABNORMAL MMOL/L (ref 3–11)
ANTI-D, PASSIVE: NORMAL
ARTERIAL PATENCY WRIST A: POSITIVE
AST SERPL-CCNC: 13 U/L (ref 0–33)
ATMOSPHERIC PRESS: 733 MMHG
BACTERIA UR QL AUTO: ABNORMAL /HPF
BARBITURATES UR QL SCN: NEGATIVE
BASE EXCESS BLDA CALC-SCNC: -15.8 MMOL/L
BASOPHILS # BLD AUTO: 0.04 10*3/MM3 (ref 0–0.2)
BASOPHILS # BLD AUTO: 0.04 10*3/MM3 (ref 0–0.2)
BASOPHILS # BLD AUTO: 0.04 10*3/MM3 (ref 0–0.3)
BASOPHILS NFR BLD AUTO: 0.3 % (ref 0–1)
BASOPHILS NFR BLD AUTO: 0.4 % (ref 0–1)
BASOPHILS NFR BLD AUTO: 0.4 % (ref 0–2)
BDY SITE: ABNORMAL
BENZODIAZ UR QL SCN: NEGATIVE
BILIRUB SERPL-MCNC: 0.6 MG/DL (ref 0.3–1.2)
BILIRUB UR QL STRIP: NEGATIVE
BLD GP AB SCN SERPL QL: POSITIVE
BLD GP AB SCN SERPL QL: POSITIVE
BODY TEMPERATURE: 98.6 C
BUN BLD-MCNC: 7 MG/DL (ref 7–21)
BUN BLD-MCNC: 7 MG/DL (ref 7–21)
BUN BLD-MCNC: 7 MG/DL (ref 9–23)
BUN BLD-MCNC: 8 MG/DL (ref 9–23)
BUN/CREAT SERPL: 10 (ref 7–25)
BUN/CREAT SERPL: 12.5 (ref 7–25)
BUN/CREAT SERPL: 13.3 (ref 7–25)
BUN/CREAT SERPL: 14.3 (ref 7–25)
BUPRENORPHINE SERPL-MCNC: NEGATIVE NG/ML
CA-I SERPL ISE-MCNC: 1.36 MMOL/L (ref 1.12–1.32)
CA-I SERPL ISE-MCNC: 1.39 MMOL/L (ref 1.12–1.32)
CALCIUM SPEC-SCNC: 9 MG/DL (ref 7.7–10)
CALCIUM SPEC-SCNC: 9.2 MG/DL (ref 7.7–10)
CALCIUM SPEC-SCNC: 9.2 MG/DL (ref 8.7–10.4)
CALCIUM SPEC-SCNC: 9.4 MG/DL (ref 8.7–10.4)
CANNABINOIDS SERPL QL: NEGATIVE
CHLORIDE SERPL-SCNC: 105 MMOL/L (ref 99–109)
CHLORIDE SERPL-SCNC: 108 MMOL/L (ref 99–109)
CHLORIDE SERPL-SCNC: 109 MMOL/L (ref 99–112)
CHLORIDE SERPL-SCNC: 109 MMOL/L (ref 99–112)
CLARITY UR: ABNORMAL
CO2 SERPL-SCNC: 13.1 MMOL/L (ref 24.3–31.9)
CO2 SERPL-SCNC: 13.3 MMOL/L (ref 24.3–31.9)
CO2 SERPL-SCNC: 14 MMOL/L (ref 20–31)
CO2 SERPL-SCNC: <10 MMOL/L (ref 20–31)
COCAINE UR QL: NEGATIVE
COHGB MFR BLD: 0.6 % (ref 0–5)
COLOR UR: YELLOW
CREAT BLD-MCNC: 0.49 MG/DL (ref 0.43–1.29)
CREAT BLD-MCNC: 0.56 MG/DL (ref 0.43–1.29)
CREAT BLD-MCNC: 0.6 MG/DL (ref 0.6–1.3)
CREAT BLD-MCNC: 0.7 MG/DL (ref 0.6–1.3)
DEPRECATED RDW RBC AUTO: 45.6 FL (ref 37–54)
DEPRECATED RDW RBC AUTO: 45.8 FL (ref 37–54)
DEPRECATED RDW RBC AUTO: 47 FL (ref 37–54)
EOSINOPHIL # BLD AUTO: 0.02 10*3/MM3 (ref 0–0.3)
EOSINOPHIL # BLD AUTO: 0.03 10*3/MM3 (ref 0–0.7)
EOSINOPHIL # BLD AUTO: 0.04 10*3/MM3 (ref 0–0.3)
EOSINOPHIL NFR BLD AUTO: 0.2 % (ref 0–3)
EOSINOPHIL NFR BLD AUTO: 0.3 % (ref 0–3)
EOSINOPHIL NFR BLD AUTO: 0.3 % (ref 0–5)
ERYTHROCYTE [DISTWIDTH] IN BLOOD BY AUTOMATED COUNT: 14.6 % (ref 11.3–14.5)
ERYTHROCYTE [DISTWIDTH] IN BLOOD BY AUTOMATED COUNT: 14.9 % (ref 11.5–14.5)
ERYTHROCYTE [DISTWIDTH] IN BLOOD BY AUTOMATED COUNT: 15 % (ref 11.3–14.5)
GFR SERPL CREATININE-BSD FRML MDRD: 107 ML/MIN/1.73
GFR SERPL CREATININE-BSD FRML MDRD: 127 ML/MIN/1.73
GFR SERPL CREATININE-BSD FRML MDRD: 138 ML/MIN/1.73
GFR SERPL CREATININE-BSD FRML MDRD: >150 ML/MIN/1.73
GLOBULIN UR ELPH-MCNC: 3.1 GM/DL
GLUCOSE BLD-MCNC: 119 MG/DL (ref 70–110)
GLUCOSE BLD-MCNC: 202 MG/DL (ref 70–110)
GLUCOSE BLD-MCNC: 229 MG/DL (ref 70–100)
GLUCOSE BLD-MCNC: 88 MG/DL (ref 70–100)
GLUCOSE BLDC GLUCOMTR-MCNC: 104 MG/DL (ref 70–130)
GLUCOSE BLDC GLUCOMTR-MCNC: 112 MG/DL (ref 70–130)
GLUCOSE BLDC GLUCOMTR-MCNC: 120 MG/DL (ref 70–130)
GLUCOSE BLDC GLUCOMTR-MCNC: 131 MG/DL (ref 70–130)
GLUCOSE BLDC GLUCOMTR-MCNC: 145 MG/DL (ref 70–130)
GLUCOSE BLDC GLUCOMTR-MCNC: 147 MG/DL (ref 70–130)
GLUCOSE BLDC GLUCOMTR-MCNC: 155 MG/DL (ref 70–130)
GLUCOSE BLDC GLUCOMTR-MCNC: 192 MG/DL (ref 70–130)
GLUCOSE BLDC GLUCOMTR-MCNC: 195 MG/DL (ref 70–130)
GLUCOSE BLDC GLUCOMTR-MCNC: 195 MG/DL (ref 70–130)
GLUCOSE BLDC GLUCOMTR-MCNC: 229 MG/DL (ref 70–130)
GLUCOSE BLDC GLUCOMTR-MCNC: 232 MG/DL (ref 70–130)
GLUCOSE BLDC GLUCOMTR-MCNC: 237 MG/DL (ref 70–130)
GLUCOSE BLDC GLUCOMTR-MCNC: 78 MG/DL (ref 70–130)
GLUCOSE UR STRIP-MCNC: ABNORMAL MG/DL
HBA1C MFR BLD: 9.7 % (ref 4.8–5.6)
HCO3 BLDA-SCNC: 8.2 MMOL/L (ref 22–26)
HCT VFR BLD AUTO: 32.8 % (ref 37–47)
HCT VFR BLD AUTO: 34.9 % (ref 34.5–44)
HCT VFR BLD AUTO: 37.5 % (ref 34.5–44)
HCT VFR BLD CALC: 35 % (ref 37–47)
HGB BLD-MCNC: 11.2 G/DL (ref 11.5–15.5)
HGB BLD-MCNC: 11.7 G/DL (ref 11.5–15.5)
HGB BLD-MCNC: 9.9 G/DL (ref 12–16)
HGB BLDA-MCNC: 11.9 G/DL (ref 12–16)
HGB UR QL STRIP.AUTO: ABNORMAL
HOROWITZ INDEX BLD+IHG-RTO: 21 %
HYALINE CASTS UR QL AUTO: ABNORMAL /LPF
IMM GRANULOCYTES # BLD: 0.06 10*3/MM3 (ref 0–0.03)
IMM GRANULOCYTES # BLD: 0.11 10*3/MM3 (ref 0–0.03)
IMM GRANULOCYTES # BLD: 0.12 10*3/MM3 (ref 0–0.03)
IMM GRANULOCYTES NFR BLD: 0.6 % (ref 0–0.5)
IMM GRANULOCYTES NFR BLD: 0.9 % (ref 0–0.6)
IMM GRANULOCYTES NFR BLD: 1.1 % (ref 0–0.6)
KETONES UR QL STRIP: ABNORMAL
LEUKOCYTE ESTERASE UR QL STRIP.AUTO: NEGATIVE
LYMPHOCYTES # BLD AUTO: 1.59 10*3/MM3 (ref 1–3)
LYMPHOCYTES # BLD AUTO: 1.6 10*3/MM3 (ref 0.6–4.8)
LYMPHOCYTES # BLD AUTO: 1.65 10*3/MM3 (ref 0.6–4.8)
LYMPHOCYTES NFR BLD AUTO: 13.4 % (ref 24–44)
LYMPHOCYTES NFR BLD AUTO: 14.8 % (ref 24–44)
LYMPHOCYTES NFR BLD AUTO: 15.1 % (ref 21–51)
MAGNESIUM SERPL-MCNC: 1.4 MG/DL (ref 1.6–2.2)
MAGNESIUM SERPL-MCNC: 1.5 MG/DL (ref 1.6–2.2)
MAGNESIUM SERPL-MCNC: 1.6 MG/DL (ref 1.3–2.7)
MAGNESIUM SERPL-MCNC: 1.7 MG/DL (ref 1.3–2.7)
MCH RBC QN AUTO: 26.4 PG (ref 27–33)
MCH RBC QN AUTO: 26.5 PG (ref 27–31)
MCH RBC QN AUTO: 26.7 PG (ref 27–31)
MCHC RBC AUTO-ENTMCNC: 30.2 G/DL (ref 33–37)
MCHC RBC AUTO-ENTMCNC: 31.2 G/DL (ref 32–36)
MCHC RBC AUTO-ENTMCNC: 32.1 G/DL (ref 32–36)
MCV RBC AUTO: 83.3 FL (ref 80–99)
MCV RBC AUTO: 84.8 FL (ref 80–99)
MCV RBC AUTO: 87.5 FL (ref 80–94)
METHADONE UR QL SCN: NEGATIVE
METHGB BLD QL: 0.4 % (ref 0–3)
MODALITY: ABNORMAL
MONOCYTES # BLD AUTO: 0.79 10*3/MM3 (ref 0–1)
MONOCYTES # BLD AUTO: 0.92 10*3/MM3 (ref 0.1–0.9)
MONOCYTES # BLD AUTO: 0.94 10*3/MM3 (ref 0–1)
MONOCYTES NFR BLD AUTO: 7.1 % (ref 0–12)
MONOCYTES NFR BLD AUTO: 7.9 % (ref 0–12)
MONOCYTES NFR BLD AUTO: 8.7 % (ref 0–10)
NEUTROPHILS # BLD AUTO: 7.88 10*3/MM3 (ref 1.4–6.5)
NEUTROPHILS # BLD AUTO: 8.56 10*3/MM3 (ref 1.5–8.3)
NEUTROPHILS # BLD AUTO: 9.24 10*3/MM3 (ref 1.5–8.3)
NEUTROPHILS NFR BLD AUTO: 74.9 % (ref 30–70)
NEUTROPHILS NFR BLD AUTO: 76.4 % (ref 41–71)
NEUTROPHILS NFR BLD AUTO: 77.2 % (ref 41–71)
NITRITE UR QL STRIP: NEGATIVE
OPIATES UR QL: NEGATIVE
OSMOLALITY SERPL CALC.SUM OF ELEC: 269.2 MOSM/KG (ref 273–305)
OSMOLALITY SERPL CALC.SUM OF ELEC: 270.1 MOSM/KG (ref 273–305)
OXYCODONE UR QL SCN: NEGATIVE
OXYHGB MFR BLDV: 79.6 % (ref 85–100)
PCO2 BLDA: 16.9 MM HG (ref 35–45)
PCP UR QL SCN: NEGATIVE
PH BLDA: 7.3 PH UNITS (ref 7.35–7.45)
PH UR STRIP.AUTO: <=5 [PH] (ref 5–8)
PHOSPHATE SERPL-MCNC: 1.7 MG/DL (ref 2.4–5.1)
PHOSPHATE SERPL-MCNC: 2.6 MG/DL (ref 2.4–5.1)
PHOSPHATE SERPL-MCNC: 3 MG/DL (ref 2.7–4.5)
PHOSPHATE SERPL-MCNC: 3.4 MG/DL (ref 2.7–4.5)
PLATELET # BLD AUTO: 189 10*3/MM3 (ref 130–400)
PLATELET # BLD AUTO: 235 10*3/MM3 (ref 150–450)
PLATELET # BLD AUTO: 253 10*3/MM3 (ref 150–450)
PMV BLD AUTO: 11.7 FL (ref 6–12)
PMV BLD AUTO: 12.5 FL (ref 6–10)
PMV BLD AUTO: 12.5 FL (ref 6–12)
PO2 BLDA: 50 MM HG (ref 80–100)
POTASSIUM BLD-SCNC: 3.8 MMOL/L (ref 3.5–5.5)
POTASSIUM BLD-SCNC: 4 MMOL/L (ref 3.5–5.3)
POTASSIUM BLD-SCNC: 4.3 MMOL/L (ref 3.5–5.5)
POTASSIUM BLD-SCNC: 4.4 MMOL/L (ref 3.5–5.3)
PROPOXYPH UR QL: NEGATIVE
PROT SERPL-MCNC: 7 G/DL (ref 5.7–8.2)
PROT UR QL STRIP: ABNORMAL
RBC # BLD AUTO: 3.75 10*6/MM3 (ref 4.2–5.4)
RBC # BLD AUTO: 4.19 10*6/MM3 (ref 3.89–5.14)
RBC # BLD AUTO: 4.42 10*6/MM3 (ref 3.89–5.14)
RBC # UR: ABNORMAL /HPF
REF LAB TEST METHOD: ABNORMAL
RESIDUAL RHIG DETECTED: NORMAL
RH BLD: NEGATIVE
RH BLD: NEGATIVE
SAO2 % BLDCOA: 80.4 % (ref 90–100)
SODIUM BLD-SCNC: 132 MMOL/L (ref 132–146)
SODIUM BLD-SCNC: 133 MMOL/L (ref 135–153)
SODIUM BLD-SCNC: 134 MMOL/L (ref 132–146)
SODIUM BLD-SCNC: 135 MMOL/L (ref 135–153)
SP GR UR STRIP: 1.02 (ref 1–1.03)
SQUAMOUS #/AREA URNS HPF: ABNORMAL /HPF
TRICYCLICS UR QL SCN: NEGATIVE
TROPONIN I SERPL-MCNC: <0.006 NG/ML
TSH SERPL DL<=0.05 MIU/L-ACNC: 1.32 MIU/ML (ref 0.35–5.35)
UROBILINOGEN UR QL STRIP: ABNORMAL
WBC NRBC COR # BLD: 10.52 10*3/MM3 (ref 4.5–12.5)
WBC NRBC COR # BLD: 11.18 10*3/MM3 (ref 4.5–13.5)
WBC NRBC COR # BLD: 11.97 10*3/MM3 (ref 4.5–13.5)
WBC UR QL AUTO: ABNORMAL /HPF

## 2017-11-30 PROCEDURE — 83735 ASSAY OF MAGNESIUM: CPT | Performed by: OBSTETRICS & GYNECOLOGY

## 2017-11-30 PROCEDURE — 82962 GLUCOSE BLOOD TEST: CPT

## 2017-11-30 PROCEDURE — 25810000003 POTASSIUM CHLORIDE PER 2 MEQ: Performed by: HOSPITALIST

## 2017-11-30 PROCEDURE — 82330 ASSAY OF CALCIUM: CPT | Performed by: OBSTETRICS & GYNECOLOGY

## 2017-11-30 PROCEDURE — 59025 FETAL NON-STRESS TEST: CPT

## 2017-11-30 PROCEDURE — 86850 RBC ANTIBODY SCREEN: CPT | Performed by: OBSTETRICS & GYNECOLOGY

## 2017-11-30 PROCEDURE — 99221 1ST HOSP IP/OBS SF/LOW 40: CPT | Performed by: OBSTETRICS & GYNECOLOGY

## 2017-11-30 PROCEDURE — 93010 ELECTROCARDIOGRAM REPORT: CPT | Performed by: INTERNAL MEDICINE

## 2017-11-30 PROCEDURE — 82009 KETONE BODYS QUAL: CPT | Performed by: HOSPITALIST

## 2017-11-30 PROCEDURE — 93005 ELECTROCARDIOGRAM TRACING: CPT | Performed by: HOSPITALIST

## 2017-11-30 PROCEDURE — 87040 BLOOD CULTURE FOR BACTERIA: CPT | Performed by: HOSPITALIST

## 2017-11-30 PROCEDURE — 63710000001 INSULIN REGULAR HUMAN PER 5 UNITS: Performed by: OBSTETRICS & GYNECOLOGY

## 2017-11-30 PROCEDURE — 86900 BLOOD TYPING SEROLOGIC ABO: CPT | Performed by: OBSTETRICS & GYNECOLOGY

## 2017-11-30 PROCEDURE — 86870 RBC ANTIBODY IDENTIFICATION: CPT | Performed by: OBSTETRICS & GYNECOLOGY

## 2017-11-30 PROCEDURE — 25010000002 ONDANSETRON PER 1 MG: Performed by: OBSTETRICS & GYNECOLOGY

## 2017-11-30 PROCEDURE — 84100 ASSAY OF PHOSPHORUS: CPT | Performed by: HOSPITALIST

## 2017-11-30 PROCEDURE — 85025 COMPLETE CBC W/AUTO DIFF WBC: CPT | Performed by: HOSPITALIST

## 2017-11-30 PROCEDURE — 83036 HEMOGLOBIN GLYCOSYLATED A1C: CPT | Performed by: OBSTETRICS & GYNECOLOGY

## 2017-11-30 PROCEDURE — 86901 BLOOD TYPING SEROLOGIC RH(D): CPT | Performed by: OBSTETRICS & GYNECOLOGY

## 2017-11-30 PROCEDURE — 80053 COMPREHEN METABOLIC PANEL: CPT | Performed by: HOSPITALIST

## 2017-11-30 PROCEDURE — 85025 COMPLETE CBC W/AUTO DIFF WBC: CPT | Performed by: OBSTETRICS & GYNECOLOGY

## 2017-11-30 PROCEDURE — 84443 ASSAY THYROID STIM HORMONE: CPT | Performed by: OBSTETRICS & GYNECOLOGY

## 2017-11-30 PROCEDURE — 25010000002 MAGNESIUM SULFATE IN D5W 1G/100ML (PREMIX) 1-5 GM/100ML-% SOLUTION: Performed by: HOSPITALIST

## 2017-11-30 PROCEDURE — 81001 URINALYSIS AUTO W/SCOPE: CPT | Performed by: OBSTETRICS & GYNECOLOGY

## 2017-11-30 PROCEDURE — 63710000001 INSULIN REGULAR HUMAN PER 5 UNITS: Performed by: HOSPITALIST

## 2017-11-30 PROCEDURE — 99255 IP/OBS CONSLTJ NEW/EST HI 80: CPT | Performed by: HOSPITALIST

## 2017-11-30 PROCEDURE — 83735 ASSAY OF MAGNESIUM: CPT | Performed by: HOSPITALIST

## 2017-11-30 PROCEDURE — 80306 DRUG TEST PRSMV INSTRMNT: CPT | Performed by: OBSTETRICS & GYNECOLOGY

## 2017-11-30 PROCEDURE — 25010000002 PROMETHAZINE PER 50 MG: Performed by: OBSTETRICS & GYNECOLOGY

## 2017-11-30 PROCEDURE — 84484 ASSAY OF TROPONIN QUANT: CPT | Performed by: HOSPITALIST

## 2017-11-30 PROCEDURE — 84100 ASSAY OF PHOSPHORUS: CPT | Performed by: OBSTETRICS & GYNECOLOGY

## 2017-11-30 RX ORDER — CALCIUM CARBONATE 200(500)MG
2 TABLET,CHEWABLE ORAL 3 TIMES DAILY PRN
Status: DISCONTINUED | OUTPATIENT
Start: 2017-11-30 | End: 2017-11-30 | Stop reason: SDUPTHER

## 2017-11-30 RX ORDER — SODIUM CHLORIDE AND POTASSIUM CHLORIDE 150; 450 MG/100ML; MG/100ML
250 INJECTION, SOLUTION INTRAVENOUS CONTINUOUS PRN
Status: DISCONTINUED | OUTPATIENT
Start: 2017-11-30 | End: 2017-11-30 | Stop reason: HOSPADM

## 2017-11-30 RX ORDER — POTASSIUM CHLORIDE 1.5 G/1.77G
20 POWDER, FOR SOLUTION ORAL AS NEEDED
Status: DISCONTINUED | OUTPATIENT
Start: 2017-11-30 | End: 2017-12-05 | Stop reason: HOSPADM

## 2017-11-30 RX ORDER — MAGNESIUM SULFATE HEPTAHYDRATE 40 MG/ML
2 INJECTION, SOLUTION INTRAVENOUS AS NEEDED
Status: DISCONTINUED | OUTPATIENT
Start: 2017-11-30 | End: 2017-11-30 | Stop reason: HOSPADM

## 2017-11-30 RX ORDER — MAGNESIUM SULFATE 1 G/100ML
1 INJECTION INTRAVENOUS
Status: DISCONTINUED | OUTPATIENT
Start: 2017-11-30 | End: 2017-11-30 | Stop reason: HOSPADM

## 2017-11-30 RX ORDER — POTASSIUM CHLORIDE 750 MG/1
40 CAPSULE, EXTENDED RELEASE ORAL AS NEEDED
Status: DISCONTINUED | OUTPATIENT
Start: 2017-11-30 | End: 2017-12-05 | Stop reason: HOSPADM

## 2017-11-30 RX ORDER — FAMOTIDINE 20 MG/1
20 TABLET, FILM COATED ORAL 2 TIMES DAILY PRN
Status: DISCONTINUED | OUTPATIENT
Start: 2017-11-30 | End: 2017-12-05 | Stop reason: HOSPADM

## 2017-11-30 RX ORDER — DEXTROSE MONOHYDRATE 25 G/50ML
12.5 INJECTION, SOLUTION INTRAVENOUS
Status: DISCONTINUED | OUTPATIENT
Start: 2017-11-30 | End: 2017-12-05 | Stop reason: HOSPADM

## 2017-11-30 RX ORDER — DEXTROSE AND SODIUM CHLORIDE 5; .45 G/100ML; G/100ML
150 INJECTION, SOLUTION INTRAVENOUS CONTINUOUS PRN
Status: DISCONTINUED | OUTPATIENT
Start: 2017-11-30 | End: 2017-12-05 | Stop reason: HOSPADM

## 2017-11-30 RX ORDER — ONDANSETRON 2 MG/ML
4 INJECTION INTRAMUSCULAR; INTRAVENOUS EVERY 6 HOURS PRN
Status: DISCONTINUED | OUTPATIENT
Start: 2017-11-30 | End: 2017-11-30 | Stop reason: HOSPADM

## 2017-11-30 RX ORDER — SODIUM CHLORIDE 450 MG/100ML
250 INJECTION, SOLUTION INTRAVENOUS CONTINUOUS
Status: DISCONTINUED | OUTPATIENT
Start: 2017-11-30 | End: 2017-11-30

## 2017-11-30 RX ORDER — POTASSIUM CHLORIDE 750 MG/1
10 CAPSULE, EXTENDED RELEASE ORAL AS NEEDED
Status: DISCONTINUED | OUTPATIENT
Start: 2017-11-30 | End: 2017-12-05 | Stop reason: HOSPADM

## 2017-11-30 RX ORDER — POTASSIUM CHLORIDE 750 MG/1
20 CAPSULE, EXTENDED RELEASE ORAL AS NEEDED
Status: DISCONTINUED | OUTPATIENT
Start: 2017-11-30 | End: 2017-12-05 | Stop reason: HOSPADM

## 2017-11-30 RX ORDER — POTASSIUM CHLORIDE 7.46 G/1000ML
10 INJECTION, SOLUTION INTRAVENOUS
Status: DISCONTINUED | OUTPATIENT
Start: 2017-11-30 | End: 2017-12-05 | Stop reason: HOSPADM

## 2017-11-30 RX ORDER — DEXTROSE AND SODIUM CHLORIDE 5; .45 G/100ML; G/100ML
150 INJECTION, SOLUTION INTRAVENOUS CONTINUOUS PRN
Status: DISCONTINUED | OUTPATIENT
Start: 2017-11-30 | End: 2017-11-30 | Stop reason: HOSPADM

## 2017-11-30 RX ORDER — ONDANSETRON 2 MG/ML
4 INJECTION INTRAMUSCULAR; INTRAVENOUS EVERY 8 HOURS PRN
Status: DISCONTINUED | OUTPATIENT
Start: 2017-11-30 | End: 2017-12-05 | Stop reason: HOSPADM

## 2017-11-30 RX ORDER — PROMETHAZINE HYDROCHLORIDE 25 MG/ML
12.5 INJECTION, SOLUTION INTRAMUSCULAR; INTRAVENOUS EVERY 6 HOURS PRN
Status: DISCONTINUED | OUTPATIENT
Start: 2017-11-30 | End: 2017-11-30

## 2017-11-30 RX ORDER — POTASSIUM CHLORIDE 1.5 G/1.77G
10 POWDER, FOR SOLUTION ORAL AS NEEDED
Status: DISCONTINUED | OUTPATIENT
Start: 2017-11-30 | End: 2017-12-05 | Stop reason: HOSPADM

## 2017-11-30 RX ORDER — MAGNESIUM SULFATE 1 G/100ML
1 INJECTION INTRAVENOUS AS NEEDED
Status: DISCONTINUED | OUTPATIENT
Start: 2017-11-30 | End: 2017-11-30 | Stop reason: HOSPADM

## 2017-11-30 RX ORDER — SODIUM CHLORIDE AND POTASSIUM CHLORIDE 150; 450 MG/100ML; MG/100ML
250 INJECTION, SOLUTION INTRAVENOUS CONTINUOUS PRN
Status: DISCONTINUED | OUTPATIENT
Start: 2017-11-30 | End: 2017-12-05 | Stop reason: HOSPADM

## 2017-11-30 RX ORDER — ACETAMINOPHEN 325 MG/1
650 TABLET ORAL EVERY 4 HOURS PRN
Status: DISCONTINUED | OUTPATIENT
Start: 2017-11-30 | End: 2017-12-05 | Stop reason: HOSPADM

## 2017-11-30 RX ORDER — DEXTROSE, SODIUM CHLORIDE, AND POTASSIUM CHLORIDE 5; .45; .15 G/100ML; G/100ML; G/100ML
150 INJECTION INTRAVENOUS CONTINUOUS PRN
Status: DISCONTINUED | OUTPATIENT
Start: 2017-11-30 | End: 2017-11-30 | Stop reason: HOSPADM

## 2017-11-30 RX ORDER — DEXTROSE MONOHYDRATE 25 G/50ML
12.5 INJECTION, SOLUTION INTRAVENOUS
Status: DISCONTINUED | OUTPATIENT
Start: 2017-11-30 | End: 2017-11-30 | Stop reason: HOSPADM

## 2017-11-30 RX ORDER — SODIUM CHLORIDE 0.9 % (FLUSH) 0.9 %
10 SYRINGE (ML) INJECTION AS NEEDED
Status: DISCONTINUED | OUTPATIENT
Start: 2017-11-30 | End: 2017-12-05 | Stop reason: HOSPADM

## 2017-11-30 RX ORDER — MAGNESIUM SULFATE HEPTAHYDRATE 40 MG/ML
4 INJECTION, SOLUTION INTRAVENOUS AS NEEDED
Status: DISCONTINUED | OUTPATIENT
Start: 2017-11-30 | End: 2017-11-30 | Stop reason: HOSPADM

## 2017-11-30 RX ORDER — FAMOTIDINE 10 MG/ML
20 INJECTION, SOLUTION INTRAVENOUS EVERY 12 HOURS SCHEDULED
Status: DISCONTINUED | OUTPATIENT
Start: 2017-11-30 | End: 2017-11-30 | Stop reason: HOSPADM

## 2017-11-30 RX ORDER — SODIUM CHLORIDE 450 MG/100ML
250 INJECTION, SOLUTION INTRAVENOUS CONTINUOUS
Status: DISCONTINUED | OUTPATIENT
Start: 2017-11-30 | End: 2017-11-30 | Stop reason: HOSPADM

## 2017-11-30 RX ORDER — NALOXONE HCL 0.4 MG/ML
0.4 VIAL (ML) INJECTION
Status: DISCONTINUED | OUTPATIENT
Start: 2017-11-30 | End: 2017-12-05 | Stop reason: HOSPADM

## 2017-11-30 RX ORDER — POTASSIUM CHLORIDE 1.5 G/1.77G
40 POWDER, FOR SOLUTION ORAL AS NEEDED
Status: DISCONTINUED | OUTPATIENT
Start: 2017-11-30 | End: 2017-12-05 | Stop reason: HOSPADM

## 2017-11-30 RX ORDER — PROMETHAZINE HYDROCHLORIDE 25 MG/ML
INJECTION, SOLUTION INTRAMUSCULAR; INTRAVENOUS
Status: DISCONTINUED
Start: 2017-11-30 | End: 2017-11-30 | Stop reason: HOSPADM

## 2017-11-30 RX ORDER — DEXTROSE, SODIUM CHLORIDE, AND POTASSIUM CHLORIDE 5; .45; .15 G/100ML; G/100ML; G/100ML
150 INJECTION INTRAVENOUS CONTINUOUS PRN
Status: DISCONTINUED | OUTPATIENT
Start: 2017-11-30 | End: 2017-12-05 | Stop reason: HOSPADM

## 2017-11-30 RX ORDER — PRENATAL VIT/IRON FUM/FOLIC AC 27MG-0.8MG
1 TABLET ORAL DAILY
Status: DISCONTINUED | OUTPATIENT
Start: 2017-11-30 | End: 2017-12-05 | Stop reason: HOSPADM

## 2017-11-30 RX ORDER — MORPHINE SULFATE 2 MG/ML
2 INJECTION, SOLUTION INTRAMUSCULAR; INTRAVENOUS
Status: DISCONTINUED | OUTPATIENT
Start: 2017-11-30 | End: 2017-12-05 | Stop reason: HOSPADM

## 2017-11-30 RX ADMIN — SODIUM CHLORIDE 125 ML/HR: 9 INJECTION, SOLUTION INTRAVENOUS at 00:28

## 2017-11-30 RX ADMIN — ONDANSETRON 4 MG: 2 INJECTION INTRAMUSCULAR; INTRAVENOUS at 22:35

## 2017-11-30 RX ADMIN — Medication 10 ML: at 22:37

## 2017-11-30 RX ADMIN — PRENATAL VIT W/ FE FUMARATE-FA TAB 27-0.8 MG 1 TABLET: 27-0.8 TAB at 16:18

## 2017-11-30 RX ADMIN — Medication 2 TABLET: at 03:53

## 2017-11-30 RX ADMIN — INSULIN HUMAN 10 UNITS: 100 INJECTION, SOLUTION PARENTERAL at 18:48

## 2017-11-30 RX ADMIN — SODIUM CHLORIDE AND POTASSIUM CHLORIDE 150 ML/HR: 4.5; 1.49 INJECTION, SOLUTION INTRAVENOUS at 09:59

## 2017-11-30 RX ADMIN — POTASSIUM CHLORIDE, DEXTROSE MONOHYDRATE AND SODIUM CHLORIDE 150 ML/HR: 150; 5; 450 INJECTION, SOLUTION INTRAVENOUS at 02:35

## 2017-11-30 RX ADMIN — SODIUM CHLORIDE 4 UNITS/HR: 9 INJECTION, SOLUTION INTRAVENOUS at 01:35

## 2017-11-30 RX ADMIN — SODIUM CHLORIDE 0.09 UNITS/KG/HR: 9 INJECTION, SOLUTION INTRAVENOUS at 14:41

## 2017-11-30 RX ADMIN — DEXTROSE AND SODIUM CHLORIDE 150 ML/HR: 5; 450 INJECTION, SOLUTION INTRAVENOUS at 14:23

## 2017-11-30 RX ADMIN — ONDANSETRON 4 MG: 2 INJECTION, SOLUTION INTRAMUSCULAR; INTRAVENOUS at 01:33

## 2017-11-30 RX ADMIN — INSULIN HUMAN 5 UNITS: 100 INJECTION, SOLUTION PARENTERAL at 21:35

## 2017-11-30 RX ADMIN — MAGNESIUM SULFATE IN DEXTROSE 1 G: 10 INJECTION, SOLUTION INTRAVENOUS at 08:39

## 2017-11-30 RX ADMIN — SODIUM CHLORIDE AND POTASSIUM CHLORIDE 250 ML/HR: 4.5; 1.49 INJECTION, SOLUTION INTRAVENOUS at 01:35

## 2017-11-30 RX ADMIN — PROMETHAZINE HYDROCHLORIDE 12.5 MG: 25 INJECTION INTRAMUSCULAR; INTRAVENOUS at 04:26

## 2017-11-30 RX ADMIN — MAGNESIUM SULFATE IN DEXTROSE 1 G: 10 INJECTION, SOLUTION INTRAVENOUS at 09:59

## 2017-11-30 RX ADMIN — FAMOTIDINE 20 MG: 10 INJECTION INTRAVENOUS at 03:51

## 2017-12-01 LAB
ALBUMIN SERPL-MCNC: 3.4 G/DL (ref 3.2–4.8)
ALBUMIN/GLOB SERPL: 1.3 G/DL (ref 1.5–2.5)
ALP SERPL-CCNC: 95 U/L (ref 25–100)
ALT SERPL W P-5'-P-CCNC: 7 U/L (ref 7–40)
ANION GAP SERPL CALCULATED.3IONS-SCNC: 10 MMOL/L (ref 3–11)
AST SERPL-CCNC: 11 U/L (ref 0–33)
BILIRUB SERPL-MCNC: 0.4 MG/DL (ref 0.3–1.2)
BUN BLD-MCNC: 11 MG/DL (ref 9–23)
BUN/CREAT SERPL: 15.7 (ref 7–25)
CALCIUM SPEC-SCNC: 8.8 MG/DL (ref 8.7–10.4)
CHLORIDE SERPL-SCNC: 106 MMOL/L (ref 99–109)
CO2 SERPL-SCNC: 14 MMOL/L (ref 20–31)
CREAT BLD-MCNC: 0.7 MG/DL (ref 0.6–1.3)
GFR SERPL CREATININE-BSD FRML MDRD: 107 ML/MIN/1.73
GLOBULIN UR ELPH-MCNC: 2.7 GM/DL
GLUCOSE BLD-MCNC: 154 MG/DL (ref 70–100)
GLUCOSE BLDC GLUCOMTR-MCNC: 103 MG/DL (ref 70–130)
GLUCOSE BLDC GLUCOMTR-MCNC: 128 MG/DL (ref 70–130)
GLUCOSE BLDC GLUCOMTR-MCNC: 128 MG/DL (ref 70–130)
GLUCOSE BLDC GLUCOMTR-MCNC: 152 MG/DL (ref 70–130)
GLUCOSE BLDC GLUCOMTR-MCNC: 157 MG/DL (ref 70–130)
GLUCOSE BLDC GLUCOMTR-MCNC: 215 MG/DL (ref 70–130)
GLUCOSE BLDC GLUCOMTR-MCNC: 224 MG/DL (ref 70–130)
GLUCOSE BLDC GLUCOMTR-MCNC: 240 MG/DL (ref 70–130)
POTASSIUM BLD-SCNC: 3.6 MMOL/L (ref 3.5–5.5)
POTASSIUM BLD-SCNC: 4 MMOL/L (ref 3.5–5.5)
POTASSIUM BLD-SCNC: 4.2 MMOL/L (ref 3.5–5.5)
PROT SERPL-MCNC: 6.1 G/DL (ref 5.7–8.2)
SODIUM BLD-SCNC: 130 MMOL/L (ref 132–146)

## 2017-12-01 PROCEDURE — 84132 ASSAY OF SERUM POTASSIUM: CPT | Performed by: OBSTETRICS & GYNECOLOGY

## 2017-12-01 PROCEDURE — 63710000001 INSULIN REGULAR HUMAN PER 5 UNITS: Performed by: OBSTETRICS & GYNECOLOGY

## 2017-12-01 PROCEDURE — 80053 COMPREHEN METABOLIC PANEL: CPT | Performed by: OBSTETRICS & GYNECOLOGY

## 2017-12-01 PROCEDURE — 99231 SBSQ HOSP IP/OBS SF/LOW 25: CPT | Performed by: OBSTETRICS & GYNECOLOGY

## 2017-12-01 PROCEDURE — 82962 GLUCOSE BLOOD TEST: CPT

## 2017-12-01 PROCEDURE — G0108 DIAB MANAGE TRN  PER INDIV: HCPCS | Performed by: REGISTERED NURSE

## 2017-12-01 PROCEDURE — 63710000001 INSULIN DETEMIR PER 5 UNITS: Performed by: OBSTETRICS & GYNECOLOGY

## 2017-12-01 RX ADMIN — INSULIN HUMAN 14 UNITS: 100 INJECTION, SOLUTION PARENTERAL at 16:52

## 2017-12-01 RX ADMIN — INSULIN HUMAN 27 UNITS: 100 INJECTION, SOLUTION PARENTERAL at 09:06

## 2017-12-01 RX ADMIN — INSULIN DETEMIR 60 UNITS: 100 INJECTION, SOLUTION SUBCUTANEOUS at 10:06

## 2017-12-01 RX ADMIN — POTASSIUM CHLORIDE 10 MEQ: 750 CAPSULE, EXTENDED RELEASE ORAL at 05:16

## 2017-12-01 RX ADMIN — PRENATAL VIT W/ FE FUMARATE-FA TAB 27-0.8 MG 1 TABLET: 27-0.8 TAB at 09:06

## 2017-12-01 RX ADMIN — POTASSIUM CHLORIDE 20 MEQ: 1.5 POWDER, FOR SOLUTION ORAL at 02:39

## 2017-12-01 RX ADMIN — INSULIN HUMAN 20 UNITS: 100 INJECTION, SOLUTION PARENTERAL at 13:27

## 2017-12-01 NOTE — PAYOR COMM NOTE
"[facility]  NPI:[id number]    Utilization Review  Contact: Suad Romero RN  Phone: [phone]  Fax:[phone]    DISCHARGE NOTIFICATION  DISCHARGED TO HOME ON 11/30/2017 AUTH PENDING  Alonzo Bagley (22 y.o. Female)     Date of Birth Social Security Number Address Home Phone MRN    1995  [address]  [place] [address] [phone] [id number]    [employer] Marital Status          [facility] Single       Admission Date Admission Type Admitting Provider Attending Provider Department, Room/Bed    11/29/17 Elective Ravinder Rodriguez MD  [facility] LABOR DELIVERY, L228/1    Discharge Date Discharge Disposition Discharge Destination        11/30/2017 Transfer to Another Facility             Attending Provider: (none)    Allergies:  No Known Allergies    Isolation:  None   Infection:  None   Code Status:  Prior    Ht:  66\" (167.6 cm)   Wt:  207 lb (93.9 kg)    Admission Cmt:  None   Principal Problem:  None                Active Insurance as of 11/29/2017     Primary Coverage     Payor Plan Insurance Group Employer/Plan Group    [employer] [place] [employer] [facility]      Payor Plan Address Payor Plan Phone Number Effective From Effective To    [address] [address]  9/1/2016     [place] [address]       Subscriber Name Subscriber Birth Date Member ID       ALONZO BAGLEY 1995 [id number]                 Emergency Contacts      (Rel.) Home Phone Work Phone Mobile Phone    AlexeyNanci (Mother) [phone] -- --    Loki Blackburn (Father) [phone] -- --    Anirudh Borges (Partner) [phone] -- --               Discharge Summary      Jose Noel III, MD at 11/30/2017 10:00 AM          Discharge Summary    Admit Date: 11/29/2017  6:49 PM    Admit Diagnosis: Pregnancy [Z34.90]  Pregnancy [Z34.90]    Date of Discharge:  11/30/2017    Discharge Diagnosis: Same        Hospital Course  Patient is a 22 y.o. female, G 3, P 0110. Patient was admitted yesterday " secondary to DKA. Patient stable. PDC contacted. Will transfer to [facility] [place] today.          Vital Signs  Temp:  [97.3 °F (36.3 °C)-98 °F (36.7 °C)] 97.9 °F (36.6 °C)  Heart Rate:  [] 82  Resp:  [19-20] 20  BP: (107-179)/() 116/83    Review of Systems    The following systems were reviewed and negative;  ENT, respiratory, cardiovascular, gastrointestinal, genitourinary, breast, endocrine and allergies / immunologic.      Physical Exam:      General Appearance:    Alert, cooperative, in no acute distress   Head:    Normocephalic, without obvious abnormality, atraumatic   Eyes:            Lids and lashes normal, conjunctivae and sclerae normal, no   icterus, no pallor, corneas clear, PERRLA   Ears:    Ears appear intact with no abnormalities noted   Throat:   No oral lesions, no thrush, oral mucosa moist   Neck:   No adenopathy, supple, trachea midline, no thyromegaly, no     carotid bruit, no JVD   Back:     No kyphosis present, no scoliosis present, no skin lesions,       erythema or scars, no tenderness to percussion or                   palpation,   range of motion normal   Lungs:     Clear to auscultation,respirations regular, even and                   unlabored    Heart:    Regular rhythm and normal rate, normal S1 and S2, no            murmur, no gallop, no rub, no click   Breast Exam:    Deferred   Abdomen:     Normal bowel sounds, no masses, no organomegaly, soft        non-tender, non-distended, no guarding, no rebound                 tenderness   Genitalia:    Deferred   Extremities:   Moves all extremities well, no edema, no cyanosis, no              redness   Pulses:   Pulses palpable and equal bilaterally   Skin:   No bleeding, bruising or rash   Lymph nodes:   No palpable adenopathy   Neurologic:   Cranial nerves 2 - 12 grossly intact, sensation intact, DTR        present and equal bilaterally             Condition on Discharge:  Stable    Urine Output Good    Discharge Diet:  Regular    Follow-up Appointments  Patient will follow up in clinic next week.        Jose Noel MD.   11/30/17  10:35 AM                 Electronically signed by Jose Noel III, MD at 11/30/2017 10:36 AM

## 2017-12-01 NOTE — CONSULTS
"Diabetes Education  Assessment/Teaching    Patient Name:  Emily Bagley  YOB: 1995  MRN: [id number]  Admit Date:  11/30/2017      Assessment Date:  12/1/2017       Most Recent Value    General Information      Referral From:  , Blood glucose, MD order    Height  5' 6\" (1.676 m)    Weight  192 lb (87.1 kg)    Pregnancy Assessment     Diabetes History     What type of diabetes do you have?  Type 1    Length of Diabetes Diagnosis  10 + years    Current DM knowledge  good    Have you had diabetes education/teaching in the past?  yes    Do you test your blood sugar at home?  yes    Frequency of checks  3 times daily    Who performs the test?  self    Typical readings  150 to 200s    Have you had low blood sugar? (<70mg/dl)  yes    How often do you have low blood sugar?  rare    Have you had high blood sugar? (>140mg/dl)  yes    How often do you have high blood sugar?  frequently    Education Preferences     What areas of diabetes would you like to learn about?  -- [Nothing at this time]    Nutrition Information     Assessment Topics     Healthy Eating - Assessment  Needs education    Reducing Risk - Assessment  Needs education    DM Goals     Contact Plan  Follow-up medical care, 0-7 days from discharge [FU with Dr. Kerr]               Most Recent Value    DM Education Needs     Meter  Has own    Frequency of Testing  3 times a day    Blood Glucose Target Range  70 to 120    Medication  Insulin    Problem Solving  Hypoglycemia, Hyperglycemia, Sick days, Signs, Symptoms, Treatment    Reducing Risks  A1C testing    Physical Activity  Walking    Physical Activity Frequency  Occasionally    Healthy Coping  Appropriate    Gestational  Fetal complications, Maternal complications    Discharge Plan  Home    Motivation  Moderate    Teaching Method  Explanation, Discussion, Handouts, Teach back    Patient Response  Verbalized understanding, Needs reinforcement        Ms. Bagley states she has been a Type 1 " diabetic for 17 years. She is on Levemir daily and Novolog sliding scale. She states she checks her BG levels 3 times daily.   Discussed and taught patient about type 1 diabetes self-management, risk factors, and importance of blood glucose control to reduce complications and to have a healthy baby. Target blood glucose readings and A1c goals per ADA were reviewed. Reviewed with patient current A1c of 9.7% and discussed its significance. Signs, symptoms and treatment of hyperglycemia and hypoglycemia were discussed. Lifestyle changes such as physical activity with MD approval and healthy eating were encouraged. Stressed the importance of strict blood sugar control to prevent pregnancy  complications.  Pt instructed to check blood sugar 4 times per day and to call Dr. Kerr if  Blood glucose is higher than 140 two times or more.  Patient was encouraged to keep record of blood glucose readings to take to follow up appointment with Dr. Kerr. Discussed  risk factors to mother and baby.  Discussed and gave tips for controlling blood sugar with healthy eating by following a suggested meal plan, regular exercise if approved by MD, taking medications if ordered, and stress reduction.Discussed the benefits of glucose control to reduce the risk of complications. Target blood sugar goals given to patient, and causes/ treatment of hyperglycemia and hypoglycemia (using the rule of 15) explained.         Electronically signed by:  Nathalia Eli RN  12/01/17 2:18 PM

## 2017-12-01 NOTE — PLAN OF CARE
Problem: Diabetes, Type 1 (Adult)  Goal: Signs and Symptoms of Listed Potential Problems Will be Absent or Manageable (Diabetes, Type 1)  Outcome: Ongoing (interventions implemented as appropriate)    12/01/17 1013   Diabetes, Type 1   Problems Assessed (Type 1 Diabetes) all   Problems Present (Type 1 Diabetes) impaired glycemic control

## 2017-12-01 NOTE — CONSULTS
"Adult Nutrition  Assessment/PES    Patient Name:  Emily Bagley  YOB: 1995  MRN: [id number]  Admit Date:  11/30/2017    Assessment Date:  12/1/2017    Comments:            Reason for Assessment       12/01/17 0919    Reason for Assessment    Reason For Assessment/Visit physician consult    Identified At Risk By Screening Criteria other (see comments)   DKA    Time Spent (min) 30    Endocrine DM Type 1    Obstetrical Diagnosis Pregnancy   admitted at 34w0d    Other diagnosis DKA              Nutrition/Diet History       12/01/17 0920    Nutrition/Diet History    Reported/Observed By RN;Patient;Family    Other Spoke w/RN on 11/30, she stated that patient had been diabetic since around age 5, patient has had DKA prior to this and had a still birth last year due to DKA.  Spoke w/patient, she stated that when she is pregnant her blood glucose levels remain high.  She states that she does not have an insulin pump and gives herself 1 unit of insulin for every 5 carbohdyrates.  She stated that when she is not pregnant her glucose levels remain stable, but her parents state that they are up and down.  I explained to her how important it was to give her blood glucose levels stable during pregnancy and when she isn't pregnant.  I gave her a carbohydrate counting book that breaks down the different food groups and tells her for how many carbohdrates are in foods and for what serving size, so she can more accurately count her carbohydrates to ensure she is giving herself the proper amount of insulin.  I also went over tips such as not skipping meals, stay away from concentrated sugars as much as possible and staying hydrated.  Patient stated that she has gained ~50# during her pregnancy.            Anthropometrics       12/01/17 0925    Anthropometrics    Height 167.6 cm (66\")    Weight 87.1 kg (192 lb)    Anthropometrics (Special Considerations)    Additional Documentation Pregnancy    Weight Change Gain    " Weight Change Amount 50 pounds    Weight Change Time Frame 34 weeks    Prepregnancy BMI 22.9    Ideal Body Weight (IBW)    Ideal Body Weight (IBW), Female 59.98    % Ideal Body Weight 145.51    Body Mass Index (BMI)    BMI (kg/m2) 31.05            Labs/Tests/Procedures/Meds       12/01/17 0926    Labs/Tests/Procedures/Meds    Labs/Tests Review Reviewed                Nutrition Prescription Ordered       12/01/17 0926    Nutrition Prescription PO    Current PO Diet Regular    Common Modifiers Consistent Carbohydrate            Evaluation of Received Nutrient/Fluid Intake       12/01/17 0927    PO Evaluation    Number of Days PO Intake Evaluated Insufficient Data            Problem/Interventions:        Problem 1       12/01/17 0927    Nutrition Diagnoses Problem 1    Problem 1 Impaired Nutrient Utilization    Etiology (related to) Medical Diagnosis    Endocrine DM1    Signs/Symptoms (evidenced by) Biochemical    Specific Labs Noted HgbA1C   9.7%            Problem 2       12/01/17 0928    Nutrition Diagnoses Problem 2    Problem 2 Knowledge Deficit    Etiology (related to) Medical Diagnosis    Endocrine DM Type 1   Carbohydrate Counting    Signs/Symptoms (evidenced by) Potential Information Deficit    Resolved? Yes                  Intervention Goal       12/01/17 0927    Intervention Goal    General Nutrition support treatment;Provide information regarding MNT for treatment/condition    Weight Appropriate weight gain            Nutrition Intervention       12/01/17 0927    Nutrition Intervention    RD/Tech Action Advise alternate selection;Interview for preference;Encourage intake;Follow Tx progress;Care plan reviewd              Education/Evaluation       12/01/17 0928    Education    Education Education topics;Provided education regarding    Provided education regarding Diet rationale;Key food habit change;Healthy eating for diabetes    Education Topics Diabetes;CHO counting    Monitor/Evaluation    Monitor Per  protocol;PO intake;Pertinent labs;Weight        Electronically signed by:  Francisca Fortune RD  12/01/17 9:29 AM

## 2017-12-01 NOTE — PLAN OF CARE
Problem: Patient Care Overview (Adult)  Goal: Plan of Care Review  Outcome: Ongoing (interventions implemented as appropriate)    12/01/17 0700   Coping/Psychosocial Response Interventions   Plan Of Care Reviewed With patient   Patient Care Overview   Progress improving       Goal: Adult Individualization and Mutuality  Outcome: Ongoing (interventions implemented as appropriate)  Goal: Discharge Needs Assessment  Outcome: Ongoing (interventions implemented as appropriate)    12/01/17 0700   Discharge Needs Assessment   Concerns To Be Addressed no discharge needs identified

## 2017-12-01 NOTE — PLAN OF CARE
Problem: Patient Care Overview (Adult)  Goal: Plan of Care Review  Outcome: Ongoing (interventions implemented as appropriate)    12/01/17 1013   Coping/Psychosocial Response Interventions   Plan Of Care Reviewed With patient;spouse;family   Patient Care Overview   Progress improving

## 2017-12-01 NOTE — PROGRESS NOTES
Daily Progress Note    Patient name: Emily Bagley  YOB: 1995   MRN: [id number]  Referring Provider: Ben Whitaker  Admission Date: 2017  Date of Service: 2017    Emily Bagley is a 22 y.o.    at 34w1d  admitted on 2017 for DKA (diabetic ketoacidoses)    Hospital day 1    Diagnoses:   Patient Active Problem List   Diagnosis   • Pre-existing type 1 diabetes mellitus during pregnancy in third trimester   • Polyhydramnios   • Chronic hypertension in pregnancy   • PIH (pregnancy induced hypertension)   • DKA (diabetic ketoacidoses)   • Diabetes education, encounter for   • Intrauterine fetal death in pregnancy, s/p induction, spontaneous vaginal delivery 10/9/16, Laborist Dr Juan   • Lactic acidosis   • Hypertension   • N&V (nausea and vomiting)   • Hypothyroid   • Endometritis   • IDDM (insulin dependent diabetes mellitus)   • Pregnancy   • Non-stress test reactive   • Diabetes mellitus complicating pregnancy, antepartum, third trimester   • DM (diabetes mellitus) in pregnancy   • Diabetes mellitus in pregnancy       Prenatal Labs  Lab Results   Component Value Date    HGB 11.2 (L) 2017    RUBELLASCRN Immune 2017    HEPBSAG Negative 2017    ABO O 2017    RH Negative 2017    ABSCRN Positive 2017    HEPCVIRUSABY non reactive 2016    URINECX >100,000 CFU/mL Normal Urogenital Chaya 2017       Subjective:      Emily has no complaints today.  Reports fetal movement is normal  Denies leakage of amniotic fluid.  Denies vaginal bleeding    Objective:     Vital signs:  Vital Signs Range for the last 24 hours  Temperature: Temp:  [97.3 °F (36.3 °C)-98.8 °F (37.1 °C)] 98.2 °F (36.8 °C)   Temp Source: Temp src: Oral   BP: BP: (107-135)/(68-95) 117/81   Pulse: Heart Rate:  [] 83   Respirations: Resp:  [16-20] 16   Weight: 192 lb (87.1 kg)     General: Alert & oriented x4, in no apparent distress  HEENT: Grossly normal  Resp: Unlabored  breathing  Abdomen: Soft, nontender  Uterus: Gravid, nontender  Extremities: Nontender, no pain, no edema   Neurologic:  Alert & oriented x 4,  no focal deficits noted  Psychiatric:  Speech and behavior appropriate     Non-Stress Test:  Fetal Heart Rate Assessment   Method: Fetal HR Assessment Method: external   Beats/min: Fetal HR (Beats/Min): 135   Baseline: Fetal HR Baseline: normal range (110-160 bpm)   Varibility: Fetal HR Variability: moderate (amplitude range 6 to 25 bpm)   Accels: Fetal HR Accelerations: greater than/equal to 15 bpm, lasting at least 15 seconds   Decels: Fetal HR Decelerations: absent   Tracing Category:       Uterine Assessment   Method: Method: TOCO (external toco transducer)   Frequency (min): Contraction Frequency (min): x7   Ctx Count in 10 min:     Duration: Contraction Duration (sec):    Intensity:     Intensity by IUPC:     Resting Tone: Uterine Resting Tone: soft by palpation   Resting Tone by IUPC:     [place] Units:         Medications:    insulin regular 1-25 Units Subcutaneous TID AC   prenatal vitamin 27-0.8 1 tablet Oral Daily      •  acetaminophen  •  dextrose  •  dextrose 5 % and sodium chloride 0.45 %  •  dextrose 5 % and sodium chloride 0.45 % with KCl 20 mEq/L  •  doxylamine  •  famotidine  •  Morphine **AND** naloxone  •  ondansetron  •  potassium chloride **OR** potassium chloride **OR** potassium chloride  •  potassium chloride **OR** potassium chloride **OR** potassium chloride  •  potassium chloride **OR** potassium chloride **OR** potassium chloride  •  sodium chloride 0.45 % with KCl 20 mEq  •  sodium chloride    Labs:  Lab Results   Component Value Date    WBC 11.97 11/30/2017    HGB 11.2 (L) 11/30/2017    HCT 34.9 11/30/2017    MCV 83.3 11/30/2017     11/30/2017    URICACID 4.6 11/07/2017    AST 11 12/01/2017    ALT 7 12/01/2017     (H) 10/10/2016       Results from last 7 days  Lab Units 11/30/17  1418   ABO TYPING  O   RH TYPING  Negative    ANTIBODY SCREEN  Positive       Assessment/Plan:      Emily is a 22 y.o.    at 34w1d with DKA (diabetic ketoacidoses). Restarting diet and insulin.      I spent 15 minutes with this patient of which greater than 50% was face to face counseling and coordination of care.  All questions were answered to the best my ablity.    Ben Whitaker MD  2017 6:40 AM

## 2017-12-02 LAB
GLUCOSE BLDC GLUCOMTR-MCNC: 184 MG/DL (ref 70–130)
GLUCOSE BLDC GLUCOMTR-MCNC: 221 MG/DL (ref 70–130)
GLUCOSE BLDC GLUCOMTR-MCNC: 232 MG/DL (ref 70–130)
GLUCOSE BLDC GLUCOMTR-MCNC: 234 MG/DL (ref 70–130)
GLUCOSE BLDC GLUCOMTR-MCNC: 273 MG/DL (ref 70–130)
GLUCOSE BLDC GLUCOMTR-MCNC: 76 MG/DL (ref 70–130)
GLUCOSE BLDC GLUCOMTR-MCNC: 78 MG/DL (ref 70–130)
GLUCOSE BLDC GLUCOMTR-MCNC: 88 MG/DL (ref 70–130)

## 2017-12-02 PROCEDURE — 63710000001 INSULIN DETEMIR PER 5 UNITS: Performed by: OBSTETRICS & GYNECOLOGY

## 2017-12-02 PROCEDURE — 59025 FETAL NON-STRESS TEST: CPT

## 2017-12-02 PROCEDURE — 99231 SBSQ HOSP IP/OBS SF/LOW 25: CPT | Performed by: OBSTETRICS & GYNECOLOGY

## 2017-12-02 PROCEDURE — 25010000002 ONDANSETRON PER 1 MG: Performed by: OBSTETRICS & GYNECOLOGY

## 2017-12-02 PROCEDURE — 82962 GLUCOSE BLOOD TEST: CPT

## 2017-12-02 PROCEDURE — 63710000001 INSULIN LISPRO (HUMAN) PER 5 UNITS: Performed by: OBSTETRICS & GYNECOLOGY

## 2017-12-02 RX ORDER — DEXTROSE MONOHYDRATE 25 G/50ML
25 INJECTION, SOLUTION INTRAVENOUS
Status: DISCONTINUED | OUTPATIENT
Start: 2017-12-02 | End: 2017-12-05 | Stop reason: HOSPADM

## 2017-12-02 RX ORDER — NICOTINE POLACRILEX 4 MG
15 LOZENGE BUCCAL
Status: DISCONTINUED | OUTPATIENT
Start: 2017-12-02 | End: 2017-12-05 | Stop reason: HOSPADM

## 2017-12-02 RX ADMIN — ONDANSETRON 4 MG: 2 INJECTION INTRAMUSCULAR; INTRAVENOUS at 02:26

## 2017-12-02 RX ADMIN — INSULIN HUMAN 29 UNITS: 100 INJECTION, SOLUTION PARENTERAL at 16:48

## 2017-12-02 RX ADMIN — INSULIN LISPRO 8 UNITS: 100 INJECTION, SOLUTION INTRAVENOUS; SUBCUTANEOUS at 21:37

## 2017-12-02 RX ADMIN — INSULIN HUMAN 11 UNITS: 100 INJECTION, SOLUTION PARENTERAL at 07:51

## 2017-12-02 RX ADMIN — PRENATAL VIT W/ FE FUMARATE-FA TAB 27-0.8 MG 1 TABLET: 27-0.8 TAB at 10:02

## 2017-12-02 RX ADMIN — INSULIN DETEMIR 60 UNITS: 100 INJECTION, SOLUTION SUBCUTANEOUS at 10:00

## 2017-12-02 RX ADMIN — INSULIN HUMAN 21 UNITS: 100 INJECTION, SOLUTION PARENTERAL at 12:10

## 2017-12-02 NOTE — PLAN OF CARE
Problem: Patient Care Overview (Adult)  Goal: Plan of Care Review  Outcome: Ongoing (interventions implemented as appropriate)    12/02/17 0358   Coping/Psychosocial Response Interventions   Plan Of Care Reviewed With patient   Patient Care Overview   Progress progress toward functional goals as expected       Goal: Adult Individualization and Mutuality  Outcome: Ongoing (interventions implemented as appropriate)    12/02/17 0358   Individualization   Patient Specific Preferences none verbalized   Mutuality/Individual Preferences   What Anxieties, Fears or Concerns Do You Have About Your Health or Care? none verbalized       Goal: Discharge Needs Assessment  Outcome: Ongoing (interventions implemented as appropriate)    Problem: Diabetes, Type 1 (Adult)  Goal: Signs and Symptoms of Listed Potential Problems Will be Absent or Manageable (Diabetes, Type 1)  Outcome: Ongoing (interventions implemented as appropriate)    12/02/17 0358   Diabetes, Type 1   Problems Assessed (Type 1 Diabetes) all   Problems Present (Type 1 Diabetes) none

## 2017-12-02 NOTE — PROGRESS NOTES
Maternal-Fetal Medicine   Progress Note    Patient name: Emiyl Bagley  YOB: 1995   MRN: [id number]  Admission Date: 2017  Date of Service: 2017   Referring physician: Ravinder Matthews    Emily Bagley is a 22 y.o.    at 34w2d  admitted on 2017 for DKA (diabetic ketoacidoses)    Hospital day 2    Diagnoses:   Patient Active Problem List   Diagnosis   • Pre-existing type 1 diabetes mellitus during pregnancy in third trimester   • Polyhydramnios   • Chronic hypertension in pregnancy   • PIH (pregnancy induced hypertension)   • DKA (diabetic ketoacidoses)   • Diabetes education, encounter for   • Intrauterine fetal death in pregnancy, s/p induction, spontaneous vaginal delivery 10/9/16, Laborist Dr Juan   • Lactic acidosis   • Hypertension   • N&V (nausea and vomiting)   • Hypothyroid   • Endometritis   • IDDM (insulin dependent diabetes mellitus)   • Pregnancy   • Non-stress test reactive   • Diabetes mellitus complicating pregnancy, antepartum, third trimester   • DM (diabetes mellitus) in pregnancy   • Diabetes mellitus in pregnancy       Subjective:      Emily has no complaints today.   Patient denies headache:   Patient deniess right upper quadrant pain:   Reports fetal movement is normal  Denies leakage of amniotic fluid.  Denies vaginal bleeding    Objective:     Vital signs:  Temp:  [97.6 °F (36.4 °C)-97.9 °F (36.6 °C)] 97.7 °F (36.5 °C)  Heart Rate:  [] 88  Resp:  [16-18] 16  BP: (121-134)/(76-90) 121/82    Abdomen: soft, nontender  Uterus: gravid, nontender  Extremities: nontender; no edema     FHT's: Category 1  TOCO: irregular    Cervix: last check      Most recent ultrasound:  2017   EFW  3,874 g (>99% tile for gestational age)    Medications:    insulin detemir 60 Units Subcutaneous QAM   insulin regular 1-25 Units Subcutaneous TID AC   prenatal vitamin 27-0.8 1 tablet Oral Daily      •  acetaminophen  •  dextrose  •  dextrose 5 % and  sodium chloride 0.45 %  •  dextrose 5 % and sodium chloride 0.45 % with KCl 20 mEq/L  •  doxylamine  •  famotidine  •  Morphine **AND** naloxone  •  ondansetron  •  potassium chloride **OR** potassium chloride **OR** potassium chloride  •  potassium chloride **OR** potassium chloride **OR** potassium chloride  •  potassium chloride **OR** potassium chloride **OR** potassium chloride  •  sodium chloride 0.45 % with KCl 20 mEq  •  sodium chloride    Labs:  Lab Results   Component Value Date    HGB 11.2 (L) 2017     Lab Results   Component Value Date    GLUCOSE 154 (H) 2017       Assessment/Plan:      Emily is a 22 y.o.    at 34w2d.  1. IDDM  2. DKA (diabetic ketoacidoses): Now resolved with hydration and insulin therapy  3. Previous stillbirth at 33 weeks gestation  4.  Accelerated fetal growth (EFW >99% tile for gestational age)  5.  Hydramnios  6.  No history of retinopathy but has not had an ophthalmologic evaluation in >1 year  7.  Previous 24 hour urine studies with minimal proteinuria    Plan:   1.  Complete 24-hour urine studies  2.  Discontinue IV  3.  Increase activity  4.  Continue to monitor glucoses  5.  I discussed timing of delivery with the patient.  I believe she warrants a repeat ophthalmologic evaluation to rule out retinopathy.  She is completing a 24-hour urine study to determine if she has worsening nephropathy.  Should the patient have significant retinopathy or nephropathy, I would advocate delivery at 37 weeks gestation given her underlying diabetes with suboptimal control and history of a previous stillbirth at 33 weeks gestation    I spent 15 minutes with this patient of which greater than 50% was face to face counseling and coordination of care.  All questions were answered to the best of my ablity.    Satinder Lewis MD  2017  8:55 AM

## 2017-12-03 LAB
COLLECT DURATION TIME UR: 24 HRS
COLLECT DURATION TIME UR: 24 HRS
CREAT UR-MCNC: 154.8 MG/DL
CREATINE 24H UR-MRATE: 1.16 G/24 HR (ref 0.6–1.8)
GLUCOSE BLDC GLUCOMTR-MCNC: 168 MG/DL (ref 70–130)
GLUCOSE BLDC GLUCOMTR-MCNC: 173 MG/DL (ref 70–130)
GLUCOSE BLDC GLUCOMTR-MCNC: 185 MG/DL (ref 70–130)
GLUCOSE BLDC GLUCOMTR-MCNC: 286 MG/DL (ref 70–130)
GLUCOSE BLDC GLUCOMTR-MCNC: 288 MG/DL (ref 70–130)
GLUCOSE BLDC GLUCOMTR-MCNC: 64 MG/DL (ref 70–130)
GLUCOSE BLDC GLUCOMTR-MCNC: 72 MG/DL (ref 70–130)
GLUCOSE BLDC GLUCOMTR-MCNC: 74 MG/DL (ref 70–130)
PROT 24H UR-MRATE: 345 MG/24HOURS (ref 42–225)
PROT UR-MCNC: 46 MG/DL (ref 1–14)
SPECIMEN VOL 24H UR: 750 ML
SPECIMEN VOL 24H UR: 750 ML

## 2017-12-03 PROCEDURE — 99231 SBSQ HOSP IP/OBS SF/LOW 25: CPT | Performed by: OBSTETRICS & GYNECOLOGY

## 2017-12-03 PROCEDURE — 84156 ASSAY OF PROTEIN URINE: CPT | Performed by: OBSTETRICS & GYNECOLOGY

## 2017-12-03 PROCEDURE — 82962 GLUCOSE BLOOD TEST: CPT

## 2017-12-03 PROCEDURE — 82570 ASSAY OF URINE CREATININE: CPT | Performed by: OBSTETRICS & GYNECOLOGY

## 2017-12-03 PROCEDURE — 59025 FETAL NON-STRESS TEST: CPT

## 2017-12-03 PROCEDURE — 81050 URINALYSIS VOLUME MEASURE: CPT | Performed by: OBSTETRICS & GYNECOLOGY

## 2017-12-03 PROCEDURE — 63710000001 INSULIN DETEMIR PER 5 UNITS: Performed by: OBSTETRICS & GYNECOLOGY

## 2017-12-03 RX ADMIN — INSULIN LISPRO 3 UNITS: 100 INJECTION, SOLUTION INTRAVENOUS; SUBCUTANEOUS at 06:24

## 2017-12-03 RX ADMIN — INSULIN HUMAN 25 UNITS: 100 INJECTION, SOLUTION PARENTERAL at 12:04

## 2017-12-03 RX ADMIN — INSULIN LISPRO 5 UNITS: 100 INJECTION, SOLUTION INTRAVENOUS; SUBCUTANEOUS at 12:00

## 2017-12-03 RX ADMIN — INSULIN HUMAN 19 UNITS: 100 INJECTION, SOLUTION PARENTERAL at 07:50

## 2017-12-03 RX ADMIN — INSULIN HUMAN 16 UNITS: 100 INJECTION, SOLUTION PARENTERAL at 16:55

## 2017-12-03 RX ADMIN — INSULIN DETEMIR 60 UNITS: 100 INJECTION, SOLUTION SUBCUTANEOUS at 10:20

## 2017-12-03 RX ADMIN — PRENATAL VIT W/ FE FUMARATE-FA TAB 27-0.8 MG 1 TABLET: 27-0.8 TAB at 10:20

## 2017-12-03 NOTE — PROGRESS NOTES
Daily Progress Note    Patient name: Emily Bagley  YOB: 1995   MRN: [id number]  Referring Provider: Ben Whitaker  Admission Date: 2017  Date of Service: 12/3/2017    Emily Bagley is a 22 y.o.    at 34w3d  admitted on 2017 for DKA (diabetic ketoacidoses)    Hospital day 3    Diagnoses:   Patient Active Problem List   Diagnosis   • Pre-existing type 1 diabetes mellitus during pregnancy in third trimester   • Polyhydramnios   • Chronic hypertension in pregnancy   • PIH (pregnancy induced hypertension)   • DKA (diabetic ketoacidoses)   • Diabetes education, encounter for   • Intrauterine fetal death in pregnancy, s/p induction, spontaneous vaginal delivery 10/9/16, Laborist Dr Juan   • Lactic acidosis   • Hypertension   • N&V (nausea and vomiting)   • Hypothyroid   • Endometritis   • IDDM (insulin dependent diabetes mellitus)   • Pregnancy   • Non-stress test reactive   • Diabetes mellitus complicating pregnancy, antepartum, third trimester   • DM (diabetes mellitus) in pregnancy   • Diabetes mellitus in pregnancy       Prenatal Labs  Lab Results   Component Value Date    HGB 11.2 (L) 2017    RUBELLASCRN Immune 2017    HEPBSAG Negative 2017    ABO O 2017    RH Negative 2017    ABSCRN Positive 2017    HEPCVIRUSABY non reactive 2016    URINECX >100,000 CFU/mL Normal Urogenital Chaya 2017       Subjective:      Emily has no complaints today.  Reports fetal movement is normal  No contractions  Denies leakage of amniotic fluid.  Denies vaginal bleeding    Objective:     Vital signs:  Vital Signs Range for the last 24 hours  Temperature: Temp:  [97.1 °F (36.2 °C)-98.2 °F (36.8 °C)] 97.1 °F (36.2 °C)   Temp Source: Temp src: Oral   BP: BP: (112-132)/(69-89) 112/69   Pulse: Heart Rate:  [] 93   Respirations: Resp:  [16] 16   Weight: 200 lb 1.9 oz (90.8 kg)     General: Alert & oriented x4, in no apparent distress  HEENT: Grossly  normal  Resp: Unlabored breathing  Neurologic:  Alert & oriented x 4,  no focal deficits noted  Psychiatric:  Speech and behavior appropriate     Non-Stress Test:  Fetal Heart Rate Assessment   Method: Fetal HR Assessment Method: external   Beats/min: Fetal HR (Beats/Min): 130   Baseline: Fetal HR Baseline: normal range (110-160 bpm)   Varibility: Fetal HR Variability: moderate (amplitude range 6 to 25 bpm)   Accels: Fetal HR Accelerations: greater than/equal to 15 bpm, lasting at least 15 seconds   Decels: Fetal HR Decelerations: absent   Tracing Category:       Uterine Assessment   Method: Method: TOCO (external toco transducer)   Frequency (min): Contraction Frequency (min): 1 ctx   Ctx Count in 10 min:     Duration: Contraction Duration (sec): 50-70   Intensity: Contraction Intensity: no contractions   Intensity by IUPC:     Resting Tone: Uterine Resting Tone: soft by palpation   Resting Tone by IUPC:     [place] Units:           Medications:    [START ON 12/4/2017] insulin detemir 70 Units Subcutaneous QAM   insulin lispro 0-14 Units Subcutaneous 4x Daily AC & at Bedtime   insulin regular 1-25 Units Subcutaneous TID AC   prenatal vitamin 27-0.8 1 tablet Oral Daily      •  acetaminophen  •  dextrose  •  dextrose  •  dextrose  •  dextrose 5 % and sodium chloride 0.45 %  •  dextrose 5 % and sodium chloride 0.45 % with KCl 20 mEq/L  •  doxylamine  •  famotidine  •  glucagon (human recombinant)  •  Morphine **AND** naloxone  •  ondansetron  •  potassium chloride **OR** potassium chloride **OR** potassium chloride  •  potassium chloride **OR** potassium chloride **OR** potassium chloride  •  potassium chloride **OR** potassium chloride **OR** potassium chloride  •  sodium chloride 0.45 % with KCl 20 mEq  •  sodium chloride    Labs:  Lab Results   Component Value Date    WBC 11.97 11/30/2017    HGB 11.2 (L) 11/30/2017    HCT 34.9 11/30/2017    MCV 83.3 11/30/2017     11/30/2017    URICACID 4.6 11/07/2017     AST 11 2017    ALT 7 2017     (H) 10/10/2016       Results from last 7 days  Lab Units 17  1418   ABO TYPING  O   RH TYPING  Negative   ANTIBODY SCREEN  Positive       Assessment/Plan:      Emily is a 22 y.o.    at 34w3d with DKA (diabetic ketoacidoses)  1. IDDM  2. DKA (diabetic ketoacidoses): Now resolved with hydration and insulin therapy  3. Previous stillbirth at 33 weeks gestation  4.  Accelerated fetal growth (EFW >99% tile for gestational age)  5.  Hydramnios    Glucose values elevated. Will increase levemir to 70 units from 60 units.       I spent 15 minutes with this patient of which greater than 50% was face to face counseling and coordination of care.  All questions were answered to the best my ablity.    Ben Whitaker MD  12/3/2017 1:07 PM

## 2017-12-03 NOTE — PLAN OF CARE
Problem: Patient Care Overview (Adult)  Goal: Discharge Needs Assessment  Outcome: Ongoing (interventions implemented as appropriate)    12/03/17 0505   Discharge Needs Assessment   Concerns To Be Addressed no discharge needs identified   Equipment Needed After Discharge none   Discharge Disposition home or self-care   Self-Care   Equipment Currently Used at Home glucometer         Problem: Diabetes, Type 1 (Adult)  Goal: Signs and Symptoms of Listed Potential Problems Will be Absent or Manageable (Diabetes, Type 1)  Outcome: Ongoing (interventions implemented as appropriate)    12/03/17 0505   Diabetes, Type 1   Problems Assessed (Type 1 Diabetes) all   Problems Present (Type 1 Diabetes) none

## 2017-12-03 NOTE — PLAN OF CARE
Problem: Patient Care Overview (Adult)  Goal: Plan of Care Review  Outcome: Ongoing (interventions implemented as appropriate)  Goal: Adult Individualization and Mutuality  Outcome: Ongoing (interventions implemented as appropriate)  Goal: Discharge Needs Assessment  Outcome: Ongoing (interventions implemented as appropriate)    Problem: Diabetes, Type 1 (Adult)  Goal: Signs and Symptoms of Listed Potential Problems Will be Absent or Manageable (Diabetes, Type 1)  Outcome: Ongoing (interventions implemented as appropriate)

## 2017-12-04 LAB
GLUCOSE BLDC GLUCOMTR-MCNC: 139 MG/DL (ref 70–130)
GLUCOSE BLDC GLUCOMTR-MCNC: 154 MG/DL (ref 70–130)
GLUCOSE BLDC GLUCOMTR-MCNC: 72 MG/DL (ref 70–130)
GLUCOSE BLDC GLUCOMTR-MCNC: 78 MG/DL (ref 70–130)
GLUCOSE BLDC GLUCOMTR-MCNC: 94 MG/DL (ref 70–130)

## 2017-12-04 PROCEDURE — 82962 GLUCOSE BLOOD TEST: CPT

## 2017-12-04 PROCEDURE — 59025 FETAL NON-STRESS TEST: CPT

## 2017-12-04 PROCEDURE — 63710000001 INSULIN DETEMIR PER 5 UNITS: Performed by: OBSTETRICS & GYNECOLOGY

## 2017-12-04 RX ADMIN — INSULIN HUMAN 16 UNITS: 100 INJECTION, SOLUTION PARENTERAL at 11:46

## 2017-12-04 RX ADMIN — INSULIN DETEMIR 60 UNITS: 100 INJECTION, SOLUTION SUBCUTANEOUS at 09:32

## 2017-12-04 RX ADMIN — INSULIN HUMAN 11 UNITS: 100 INJECTION, SOLUTION PARENTERAL at 07:45

## 2017-12-04 RX ADMIN — PRENATAL VIT W/ FE FUMARATE-FA TAB 27-0.8 MG 1 TABLET: 27-0.8 TAB at 07:46

## 2017-12-04 RX ADMIN — INSULIN LISPRO 3 UNITS: 100 INJECTION, SOLUTION INTRAVENOUS; SUBCUTANEOUS at 07:46

## 2017-12-04 RX ADMIN — INSULIN HUMAN 16 UNITS: 100 INJECTION, SOLUTION PARENTERAL at 17:07

## 2017-12-04 NOTE — PLAN OF CARE
Problem: Patient Care Overview (Adult)  Goal: Plan of Care Review  Outcome: Ongoing (interventions implemented as appropriate)    12/04/17 0204   Coping/Psychosocial Response Interventions   Plan Of Care Reviewed With patient   Patient Care Overview   Progress progress toward functional goals as expected       Goal: Adult Individualization and Mutuality  Outcome: Ongoing (interventions implemented as appropriate)    12/04/17 0204   Individualization   Patient Specific Preferences none verbalized   Mutuality/Individual Preferences   What Anxieties, Fears or Concerns Do You Have About Your Health or Care? none verbalized        Goal: Discharge Needs Assessment  Outcome: Ongoing (interventions implemented as appropriate)    12/04/17 0204   Discharge Needs Assessment   Concerns To Be Addressed no discharge needs identified   Equipment Needed After Discharge none   Discharge Disposition still a patient   Current Health   Anticipated Changes Related to Illness none   Self-Care   Equipment Currently Used at Home none   Living Environment   Transportation Available car;family or friend will provide         Problem: Diabetes, Type 1 (Adult)  Goal: Signs and Symptoms of Listed Potential Problems Will be Absent or Manageable (Diabetes, Type 1)  Outcome: Ongoing (interventions implemented as appropriate)    12/04/17 0204   Diabetes, Type 1   Problems Assessed (Type 1 Diabetes) all   Problems Present (Type 1 Diabetes) none

## 2017-12-04 NOTE — NURSING NOTE
12/3 @7089 pt c/o feeling like her BS is low,  12/3 @3428 FS 64, 12/3 @1141 pt refused Glutose 15g oral to bring BS up, pt would like to eat and drink , 12/4 @ 0005 recheck 94

## 2017-12-05 VITALS
DIASTOLIC BLOOD PRESSURE: 84 MMHG | RESPIRATION RATE: 16 BRPM | HEIGHT: 66 IN | HEART RATE: 85 BPM | TEMPERATURE: 98.4 F | SYSTOLIC BLOOD PRESSURE: 123 MMHG | BODY MASS INDEX: 33.4 KG/M2 | WEIGHT: 207.8 LBS | OXYGEN SATURATION: 98 %

## 2017-12-05 LAB
BACTERIA SPEC AEROBE CULT: NORMAL
BACTERIA SPEC AEROBE CULT: NORMAL
GLUCOSE BLDC GLUCOMTR-MCNC: 134 MG/DL (ref 70–130)

## 2017-12-05 PROCEDURE — 59025 FETAL NON-STRESS TEST: CPT

## 2017-12-05 PROCEDURE — 82962 GLUCOSE BLOOD TEST: CPT

## 2017-12-05 PROCEDURE — 99238 HOSP IP/OBS DSCHRG MGMT 30/<: CPT | Performed by: OBSTETRICS & GYNECOLOGY

## 2017-12-05 PROCEDURE — 63710000001 INSULIN DETEMIR PER 5 UNITS: Performed by: OBSTETRICS & GYNECOLOGY

## 2017-12-05 RX ADMIN — INSULIN DETEMIR 60 UNITS: 100 INJECTION, SOLUTION SUBCUTANEOUS at 08:53

## 2017-12-05 RX ADMIN — INSULIN HUMAN 10 UNITS: 100 INJECTION, SOLUTION PARENTERAL at 08:51

## 2017-12-05 RX ADMIN — INSULIN LISPRO 3 UNITS: 100 INJECTION, SOLUTION INTRAVENOUS; SUBCUTANEOUS at 08:52

## 2017-12-05 NOTE — DISCHARGE INSTRUCTIONS
Fetal Movement Counts  Patient Name: __________________________________________________   Patient Due Date: ____________________  Performing a fetal movement count is highly recommended in high-risk pregnancies, but it is good for every pregnant woman to do. Your caregiver may ask you to start counting fetal movements at 28 weeks of the pregnancy. Fetal movements often increase:  · After eating a full meal.  · After physical activity.  · After eating or drinking something sweet or cold.  · At rest.  Pay attention to when you feel the baby is most active. This will help you notice a pattern of your baby's sleep and wake cycles and what factors contribute to an increase in fetal movement. It is important to perform a fetal movement count at the same time each day when your baby is normally most active.    HOW TO COUNT FETAL MOVEMENTS  1. Find a quiet and comfortable area to sit or lie down on your left side. Lying on your left side provides the best blood and oxygen circulation to your baby.  2. Write down the day and time on a sheet of paper or in a journal.  3. Start counting kicks, flutters, swishes, rolls, or jabs in a 2 hour period. You should feel at least 10 movements within 2 hours.  4. If you do not feel 10 movements in 2 hours, wait 2-3 hours and count again. Look for a change in the pattern or not enough counts in 2 hours.  SEEK MEDICAL CARE IF:  · You feel less than 10 counts in 2 hours, tried twice.  · There is no movement in over an hour.  · The pattern is changing or taking longer each day to reach 10 counts in 2 hours.  · You feel the baby is not moving as he or she usually does.    Date  Movements  Start Time  Finish Time    Date  Movements  Start Time  Finish Time    Date  Movements  Start Time  Finish Time    Date  Movements  Start Time  Finish Time    Date  Movements  Start Time  Finish Time    Date  Movements  Start Time  Finish Time      Date  Movements  Start Time  Finish Time    Date  Movements   Start Time  Finish Time    Date  Movements  Start Time  Finish Time    Date  Movements  Start Time  Finish Time    Date  Movements  Start Time  Finish Time    Date  Movements  Start Time  Finish Time      Date  Movements  Start Time  Finish Time    Date  Movements  Start Time  Finish Time    Date  Movements  Start Time  Finish Time    Date  Movements  Start Time  Finish Time    Date  Movements  Start Time  Finish Time    Date  Movements  Start Time  Finish Time    Date  Movements  Start Time  Finish Time    Date  Movements  Start Time  Finish Time    Date  Movements  Start Time  Finish Time    Date  Movements  Start Time  Finish Time    Date  Movements  Start Time  Finish Time    Date  Movements  Start Time  Finish Time      Document Released: 01/17/2008   Document Revised: 12/04/2013   Document Reviewed: 10/14/2013    ExitCare® Patient Information ©2015 [employer]. This information is not intended to replace advice given to you by your health care provider. Make sure you discuss any questions you have with your health care provider.  [place] Contractions  Contractions of the uterus can occur throughout pregnancy. Contractions are not always a sign that you are in labor.   WHAT ARE [place] ALARCON CONTRACTIONS?   Contractions that occur before labor are called [place] contractions, or false labor. Toward the end of pregnancy (32-34 weeks), these contractions can develop more often and may become more forceful. This is not true labor because these contractions do not result in opening (dilatation) and thinning of the cervix. They are sometimes difficult to tell apart from true labor because these contractions can be forceful and people have different pain tolerances. You should not feel embarrassed if you go to the hospital with false labor. Sometimes, the only way to tell if you are in true labor is for your health care provider to look for changes in the cervix.  If there are no prenatal problems or  other health problems associated with the pregnancy, it is completely safe to be sent home with false labor and await the onset of true labor.  HOW CAN YOU TELL THE DIFFERENCE BETWEEN TRUE AND FALSE LABOR?  False Labor  · The contractions of false labor are usually shorter and not as hard as those of true labor.    · The contractions are usually irregular.    · The contractions are often felt in the front of the lower abdomen and in the groin.    · The contractions may go away when you walk around or change positions while lying down.    · The contractions get weaker and are shorter lasting as time goes on.    · The contractions do not usually become progressively stronger, regular, and closer together as with true labor.    True Labor  · Contractions in true labor last 30-70 seconds, become very regular, usually become more intense, and increase in frequency.    · The contractions do not go away with walking.    · The discomfort is usually felt in the top of the uterus and spreads to the lower abdomen and low back.    · True labor can be determined by your health care provider with an exam. This will show that the cervix is dilating and getting thinner.    WHAT TO REMEMBER  · Keep up with your usual exercises and follow other instructions given by your health care provider.    · Take medicines as directed by your health care provider.    · Keep your regular prenatal appointments.    · Eat and drink lightly if you think you are going into labor.    · If [place] contractions are making you uncomfortable:      Change your position from lying down or resting to walking, or from walking to resting.      Sit and rest in a tub of warm water.      Drink 2-3 glasses of water. Dehydration may cause these contractions.      Do slow and deep breathing several times an hour.    WHEN SHOULD I SEEK IMMEDIATE MEDICAL CARE?  Seek immediate medical care if:  · Your contractions become stronger, more regular, and closer  together.    · You have fluid leaking or gushing from your vagina.    · You have a fever.    · You pass blood-tinged mucus.    · You have vaginal bleeding.    · You have continuous abdominal pain.    · You have low back pain that you never had before.    · You feel your baby's head pushing down and causing pelvic pressure.    · Your baby is not moving as much as it used to.       This information is not intended to replace advice given to you by your health care provider. Make sure you discuss any questions you have with your health care provider.     Document Released: 12/18/2006 Document Revised: 04/10/2017 Document Reviewed: 09/29/2014  [employer] Interactive Patient Education ©2017 [employer]  Diabetic Ketoacidosis  Diabetic ketoacidosis is a life-threatening complication of diabetes. If it is not treated, it can cause severe dehydration and organ damage and can lead to a coma or death.  CAUSES  This condition develops when there is not enough of the hormone insulin in the body. Insulin helps the body to break down sugar for energy. Without insulin, the body cannot break down sugar, so it breaks down fats instead. This leads to the production of acids that are called ketones. Ketones are poisonous at high levels.  This condition can be triggered by:  · Stress on the body that is brought on by an illness.  · Medicines that raise blood glucose levels.  · Not taking diabetes medicine.  SYMPTOMS  Symptoms of this condition include:  · Fatigue.  · Weight loss.  · Excessive thirst.  · Light-headedness.  · Fruity or sweet-smelling breath.  · Excessive urination.  · Vision changes.  · Confusion or irritability.  · Nausea.  · Vomiting.  · Rapid breathing.  · Abdominal pain.  · Feeling flushed.  DIAGNOSIS  This condition is diagnosed based on a medical history, a physical exam, and blood tests. You may also have a urine test that checks for ketones.  TREATMENT  This condition may be treated with:  · Fluid replacement.  "This may be done to correct dehydration.  · Insulin injections. These may be given through the skin or through an IV tube.  · Electrolyte replacement. Electrolytes, such as potassium and sodium, may be given in pill form or through an IV tube.  · Antibiotic medicines. These may be prescribed if your condition was caused by an infection.  HOME CARE INSTRUCTIONS  Eating and Drinking  · Drink enough fluids to keep your urine clear or pale yellow.  · If you cannot eat, alternate between drinking fluids with sugar (such as juice) and salty fluids (such as broth or bouillon).  · If you can eat, follow your usual diet and drink sugar-free liquids, such as water.  Other Instructions  · Take insulin as directed by your health care provider. Do not skip insulin injections. Do not use  insulin.  · If your blood sugar is over 240 mg/dL, monitor your urine ketones every 4-6 hours.  · If you were prescribed an antibiotic medicine, finish all of it even if you start to feel better.  · Rest and exercise only as directed by your health care provider.  · If you get sick, call your health care provider and begin treatment quickly. Your body often needs extra insulin to fight an illness.  · Check your blood glucose levels regularly. If your blood glucose is high, drink plenty of fluids. This helps to flush out ketones.  SEEK MEDICAL CARE IF:  · Your blood glucose level is too high or too low.  · You have ketones in your urine.  · You have a fever.  · You cannot eat.  · You cannot tolerate fluids.  · You have been vomiting for more than 2 hours.  · You continue to have symptoms of this condition.  · You develop new symptoms.  SEEK IMMEDIATE MEDICAL CARE IF:  · Your blood glucose levels continue to be high (elevated).  · Your monitor reads \"high\" even when you are taking insulin.  · You faint.  · You have chest pain.  · You have trouble breathing.  · You have a sudden, severe headache.  · You have sudden weakness in one arm or one " leg.  · You have sudden trouble speaking or swallowing.  · You have vomiting or diarrhea that gets worse after 3 hours.  · You feel severely fatigued.  · You have trouble thinking.  · You have abdominal pain.  · You are severely dehydrated. Symptoms of severe dehydration include:    Extreme thirst.    Dry mouth.    Blue lips.    Cold hands and feet.    Rapid breathing.     This information is not intended to replace advice given to you by your health care provider. Make sure you discuss any questions you have with your health care provider.     Document Released: 12/15/2001 Document Revised: 04/10/2017 Document Reviewed: 11/25/2015  [employer] Interactive Patient Education ©2017 [employer]

## 2017-12-05 NOTE — DISCHARGE SUMMARY
[facility]  Patient Name: Emily Bagley  : 1995  MRN: [id number]  Date of Service: 2017  Referring Provider: KAMAR Rodriguez    ID: [id number] y.o.  at 34w5d    Admission Diagnosis: DKA (diabetic ketoacidoses) [E13.10]    Discharge Diagnosis:   Patient Active Problem List   Diagnosis   • Pre-existing type 1 diabetes mellitus during pregnancy in third trimester   • Polyhydramnios   • Chronic hypertension in pregnancy   • PIH (pregnancy induced hypertension)   • DKA (diabetic ketoacidoses)   • Diabetes education, encounter for   • Intrauterine fetal death in pregnancy, s/p induction, spontaneous vaginal delivery 10/9/16, Laborist Dr Juan   • Lactic acidosis   • Hypertension   • N&V (nausea and vomiting)   • Hypothyroid   • Endometritis   • IDDM (insulin dependent diabetes mellitus)   • Pregnancy   • Non-stress test reactive   • Diabetes mellitus complicating pregnancy, antepartum, third trimester   • DM (diabetes mellitus) in pregnancy   • Diabetes mellitus in pregnancy       Date of Admission: 2017 11:14 AM    Date of Discharge: 2017    Discharge Condition: Stable    Discharge to: Home    Discharge Medications:    Emily Bagley   Home Medication Instructions GILLIAN:[id number]    Printed on:17 0806   Medication Information                      aspirin 81 MG EC tablet  Take 81 mg by mouth Daily.             insulin aspart (novoLOG FLEXPEN) 100 UNIT/ML solution pen-injector sc pen  Inject 30 Units under the skin 3 (Three) Times a Day With Meals.             insulin detemir (LEVEMIR) 100 UNIT/ML injection  Inject 60 Units under the skin Daily.             Loratadine (CLARITIN) 10 MG capsule  Take 1 tablet/day by mouth Daily.             Prenatal Vit-Fe Fumarate-FA (PRENATAL VITAMIN 27-0.8) 27-0.8 MG tablet tablet  Take 1 tablet by mouth Daily.                 Discharge Diet: Diabetic    Discharge Activity:  Ad Manda    Follow up appointments:   Your Scheduled Appointments     Dec 18, 2017  3:15 PM  EST   Office Visit with Ne Kerr MD   [facility] (--)    [address]  [facility] [address] [phone]           Arrive 15 minutes prior to appointment.            Dec 27, 2017  8:45 AM EST   Follow Up with  [facility] DEPT SCHEDULE   [facility] (--)    [address]  [place] [id number] [phone]           Arrive 15 minutes prior to appointment.            Dec 27, 2017  9:00 AM EST    [facility] with [facility] US 1   [facility] ([place])    [address] [address] [phone]           Patient is to be hydrated                  Hospital summary:      Emily was admitted for DKA (diabetic ketoacidoses).  Patient did not actually arrive in DKA, simply elevated sugars and hypokalemia.    She did not receive a course of  corticosteroids during her admission.      She did not receive magnesium sulfate during her admission.      Her obstetric ultrasounds and fetal testing were reassuring during her admission.  With temporary adjustments to her insulin, her sugars were controled.   Her condition was determined to be appropriate for outpatient management and she was discharged home in stable condition.  She was instructed to follow up in 1/2 week with Dr. Rodriguez to resume twice weekly  testing.  Patient to contact her endocrinologist for follow up diabetes care.    Douglas A. Milligan, MD  8:06 AM

## 2017-12-05 NOTE — PLAN OF CARE
Problem: Patient Care Overview (Adult)  Goal: Plan of Care Review  Outcome: Ongoing (interventions implemented as appropriate)    12/05/17 0325   Coping/Psychosocial Response Interventions   Plan Of Care Reviewed With patient   Patient Care Overview   Progress progress toward functional goals as expected       Goal: Adult Individualization and Mutuality  Outcome: Ongoing (interventions implemented as appropriate)    12/05/17 0325   Individualization   Patient Specific Preferences none verbalized   Mutuality/Individual Preferences   What Anxieties, Fears or Concerns Do You Have About Your Health or Care? none verbalized        Goal: Discharge Needs Assessment  Outcome: Ongoing (interventions implemented as appropriate)    12/05/17 0325   Discharge Needs Assessment   Concerns To Be Addressed no discharge needs identified   Equipment Needed After Discharge none   Discharge Disposition still a patient   Current Health   Anticipated Changes Related to Illness none   Self-Care   Equipment Currently Used at Home none   Living Environment   Transportation Available car;family or friend will provide         Problem: Diabetes, Type 1 (Adult)  Goal: Signs and Symptoms of Listed Potential Problems Will be Absent or Manageable (Diabetes, Type 1)  Outcome: Ongoing (interventions implemented as appropriate)    12/05/17 0325   Diabetes, Type 1   Problems Assessed (Type 1 Diabetes) all   Problems Present (Type 1 Diabetes) none

## 2017-12-14 ENCOUNTER — HOSPITAL ENCOUNTER (OUTPATIENT)
Facility: HOSPITAL | Age: 22
Discharge: HOME | End: 2017-12-14
Attending: OBSTETRICS & GYNECOLOGY | Admitting: OBSTETRICS & GYNECOLOGY

## 2017-12-14 ENCOUNTER — HOSPITAL ENCOUNTER (OUTPATIENT)
Dept: LABOR AND DELIVERY | Facility: HOSPITAL | Age: 22
Discharge: HOME | End: 2017-12-14

## 2017-12-14 VITALS
HEIGHT: 66 IN | BODY MASS INDEX: 34.07 KG/M2 | WEIGHT: 212 LBS | HEART RATE: 100 BPM | SYSTOLIC BLOOD PRESSURE: 140 MMHG | OXYGEN SATURATION: 99 % | DIASTOLIC BLOOD PRESSURE: 82 MMHG | RESPIRATION RATE: 19 BRPM | TEMPERATURE: 97.7 F

## 2017-12-14 PROBLEM — O21.9 NAUSEA AND VOMITING IN PREGNANCY: Status: ACTIVE | Noted: 2017-12-14

## 2017-12-14 LAB
ALBUMIN SERPL-MCNC: 3.9 G/DL (ref 3.5–5)
ALBUMIN/GLOB SERPL: 1.3 G/DL (ref 1.5–2.5)
ALP SERPL-CCNC: 123 U/L (ref 35–104)
ALT SERPL W P-5'-P-CCNC: 11 U/L (ref 10–36)
ANION GAP SERPL CALCULATED.3IONS-SCNC: 16.9 MMOL/L (ref 3.6–11.2)
AST SERPL-CCNC: 16 U/L (ref 10–30)
BACTERIA UR QL AUTO: ABNORMAL /HPF
BILIRUB SERPL-MCNC: 0.5 MG/DL (ref 0.2–1.8)
BILIRUB UR QL STRIP: NEGATIVE
BUN BLD-MCNC: 13 MG/DL (ref 7–21)
BUN/CREAT SERPL: 16.7 (ref 7–25)
CALCIUM SPEC-SCNC: 9.9 MG/DL (ref 7.7–10)
CHLORIDE SERPL-SCNC: 106 MMOL/L (ref 99–112)
CLARITY UR: ABNORMAL
CO2 SERPL-SCNC: 14.1 MMOL/L (ref 24.3–31.9)
COLOR UR: YELLOW
CREAT BLD-MCNC: 0.78 MG/DL (ref 0.43–1.29)
DEPRECATED RDW RBC AUTO: 45.8 FL (ref 37–54)
ERYTHROCYTE [DISTWIDTH] IN BLOOD BY AUTOMATED COUNT: 15.2 % (ref 11.5–14.5)
GFR SERPL CREATININE-BSD FRML MDRD: 94 ML/MIN/1.73
GLOBULIN UR ELPH-MCNC: 3.1 GM/DL
GLUCOSE BLD-MCNC: 87 MG/DL (ref 70–110)
GLUCOSE UR STRIP-MCNC: ABNORMAL MG/DL
HCT VFR BLD AUTO: 37.5 % (ref 37–47)
HGB BLD-MCNC: 12 G/DL (ref 12–16)
HGB UR QL STRIP.AUTO: NEGATIVE
HYALINE CASTS UR QL AUTO: ABNORMAL /LPF
KETONES UR QL STRIP: ABNORMAL
LEUKOCYTE ESTERASE UR QL STRIP.AUTO: ABNORMAL
MCH RBC QN AUTO: 26.9 PG (ref 27–33)
MCHC RBC AUTO-ENTMCNC: 32 G/DL (ref 33–37)
MCV RBC AUTO: 84.1 FL (ref 80–94)
NITRITE UR QL STRIP: NEGATIVE
OSMOLALITY SERPL CALC.SUM OF ELEC: 273.3 MOSM/KG (ref 273–305)
PH UR STRIP.AUTO: <=5 [PH] (ref 5–8)
PLATELET # BLD AUTO: 205 10*3/MM3 (ref 130–400)
PMV BLD AUTO: 13.1 FL (ref 6–10)
POTASSIUM BLD-SCNC: 4 MMOL/L (ref 3.5–5.3)
PROT SERPL-MCNC: 7 G/DL (ref 6–8)
PROT UR QL STRIP: ABNORMAL
RBC # BLD AUTO: 4.46 10*6/MM3 (ref 4.2–5.4)
RBC # UR: ABNORMAL /HPF
REF LAB TEST METHOD: ABNORMAL
SODIUM BLD-SCNC: 137 MMOL/L (ref 135–153)
SP GR UR STRIP: 1.02 (ref 1–1.03)
SQUAMOUS #/AREA URNS HPF: ABNORMAL /HPF
URATE SERPL-MCNC: 6.9 MG/DL (ref 2.4–5.7)
UROBILINOGEN UR QL STRIP: ABNORMAL
WBC NRBC COR # BLD: 12.03 10*3/MM3 (ref 4.5–12.5)
WBC UR QL AUTO: ABNORMAL /HPF

## 2017-12-14 PROCEDURE — 87086 URINE CULTURE/COLONY COUNT: CPT | Performed by: OBSTETRICS & GYNECOLOGY

## 2017-12-14 PROCEDURE — 81001 URINALYSIS AUTO W/SCOPE: CPT | Performed by: OBSTETRICS & GYNECOLOGY

## 2017-12-14 PROCEDURE — G0463 HOSPITAL OUTPT CLINIC VISIT: HCPCS

## 2017-12-14 PROCEDURE — 84550 ASSAY OF BLOOD/URIC ACID: CPT | Performed by: OBSTETRICS & GYNECOLOGY

## 2017-12-14 PROCEDURE — 59025 FETAL NON-STRESS TEST: CPT

## 2017-12-14 PROCEDURE — 36415 COLL VENOUS BLD VENIPUNCTURE: CPT | Performed by: OBSTETRICS & GYNECOLOGY

## 2017-12-14 PROCEDURE — 80053 COMPREHEN METABOLIC PANEL: CPT | Performed by: OBSTETRICS & GYNECOLOGY

## 2017-12-14 PROCEDURE — 85027 COMPLETE CBC AUTOMATED: CPT | Performed by: OBSTETRICS & GYNECOLOGY

## 2017-12-14 RX ORDER — LIDOCAINE HYDROCHLORIDE 10 MG/ML
5 INJECTION, SOLUTION INFILTRATION; PERINEURAL AS NEEDED
Status: DISCONTINUED | OUTPATIENT
Start: 2017-12-14 | End: 2017-12-14 | Stop reason: HOSPADM

## 2017-12-14 RX ORDER — SODIUM CHLORIDE 9 MG/ML
125 INJECTION, SOLUTION INTRAVENOUS CONTINUOUS
Status: DISCONTINUED | OUTPATIENT
Start: 2017-12-14 | End: 2017-12-14 | Stop reason: HOSPADM

## 2017-12-14 RX ORDER — SODIUM CHLORIDE 0.9 % (FLUSH) 0.9 %
1-10 SYRINGE (ML) INJECTION AS NEEDED
Status: DISCONTINUED | OUTPATIENT
Start: 2017-12-14 | End: 2017-12-14 | Stop reason: HOSPADM

## 2017-12-15 ENCOUNTER — HOSPITAL ENCOUNTER (INPATIENT)
Facility: HOSPITAL | Age: 22
LOS: 1 days | Discharge: ANOTHER HOSPITAL | End: 2017-12-15
Attending: OBSTETRICS & GYNECOLOGY | Admitting: OBSTETRICS & GYNECOLOGY

## 2017-12-15 ENCOUNTER — ANESTHESIA (OUTPATIENT)
Dept: LABOR AND DELIVERY | Facility: HOSPITAL | Age: 22
End: 2017-12-15

## 2017-12-15 ENCOUNTER — ANESTHESIA EVENT (OUTPATIENT)
Dept: LABOR AND DELIVERY | Facility: HOSPITAL | Age: 22
End: 2017-12-15

## 2017-12-15 ENCOUNTER — HOSPITAL ENCOUNTER (INPATIENT)
Facility: HOSPITAL | Age: 22
LOS: 4 days | Discharge: HOME | End: 2017-12-19
Attending: OBSTETRICS & GYNECOLOGY | Admitting: OBSTETRICS & GYNECOLOGY

## 2017-12-15 VITALS
SYSTOLIC BLOOD PRESSURE: 136 MMHG | TEMPERATURE: 98.2 F | DIASTOLIC BLOOD PRESSURE: 82 MMHG | HEART RATE: 100 BPM | BODY MASS INDEX: 34.07 KG/M2 | RESPIRATION RATE: 18 BRPM | OXYGEN SATURATION: 100 % | WEIGHT: 212 LBS | HEIGHT: 66 IN

## 2017-12-15 DIAGNOSIS — O10.919 CHRONIC HYPERTENSION IN PREGNANCY: ICD-10-CM

## 2017-12-15 DIAGNOSIS — O13.9 PREGNANCY INDUCED HYPERTENSION, ANTEPARTUM: ICD-10-CM

## 2017-12-15 DIAGNOSIS — E87.20 LACTIC ACIDOSIS: ICD-10-CM

## 2017-12-15 DIAGNOSIS — E10.10 DIABETIC KETOACIDOSIS WITHOUT COMA ASSOCIATED WITH TYPE 1 DIABETES MELLITUS (HCC): ICD-10-CM

## 2017-12-15 DIAGNOSIS — Z71.89 DIABETES EDUCATION, ENCOUNTER FOR: ICD-10-CM

## 2017-12-15 DIAGNOSIS — O21.9 NAUSEA AND VOMITING DURING PREGNANCY: ICD-10-CM

## 2017-12-15 DIAGNOSIS — O24.913 DIABETES MELLITUS COMPLICATING PREGNANCY, ANTEPARTUM, THIRD TRIMESTER: ICD-10-CM

## 2017-12-15 DIAGNOSIS — O21.9 NAUSEA AND VOMITING IN PREGNANCY: ICD-10-CM

## 2017-12-15 DIAGNOSIS — N71.9 ENDOMETRITIS: ICD-10-CM

## 2017-12-15 DIAGNOSIS — E10.10 TYPE 1 DIABETES MELLITUS WITH KETOACIDOSIS WITHOUT COMA (HCC): Primary | ICD-10-CM

## 2017-12-15 DIAGNOSIS — IMO0001 IDDM (INSULIN DEPENDENT DIABETES MELLITUS): ICD-10-CM

## 2017-12-15 DIAGNOSIS — Z36.89 NON-STRESS TEST REACTIVE: ICD-10-CM

## 2017-12-15 DIAGNOSIS — O24.013 PRE-EXISTING TYPE 1 DIABETES MELLITUS DURING PREGNANCY IN THIRD TRIMESTER: ICD-10-CM

## 2017-12-15 DIAGNOSIS — O36.4XX0 FETAL DEATH AFFECTING MANAGEMENT OF MOTHER, SINGLE OR UNSPECIFIED FETUS: ICD-10-CM

## 2017-12-15 LAB
A-A DO2: 3.5 MMHG (ref 0–300)
ABO GROUP BLD: NORMAL
ACETONE BLD QL: ABNORMAL
ALBUMIN SERPL-MCNC: 3 G/DL (ref 3.2–4.8)
ALBUMIN SERPL-MCNC: 3.6 G/DL (ref 3.5–5)
ALBUMIN/GLOB SERPL: 1.2 G/DL (ref 1.5–2.5)
ALBUMIN/GLOB SERPL: 1.3 G/DL (ref 1.5–2.5)
ALP SERPL-CCNC: 120 U/L (ref 35–104)
ALP SERPL-CCNC: 97 U/L (ref 25–100)
ALT SERPL W P-5'-P-CCNC: 11 U/L (ref 7–40)
ALT SERPL W P-5'-P-CCNC: 15 U/L (ref 10–36)
ANION GAP SERPL CALCULATED.3IONS-SCNC: 12 MMOL/L (ref 3–11)
ANION GAP SERPL CALCULATED.3IONS-SCNC: 18 MMOL/L (ref 3–11)
ANION GAP SERPL CALCULATED.3IONS-SCNC: 20 MMOL/L (ref 3–11)
ANION GAP SERPL CALCULATED.3IONS-SCNC: 5 MMOL/L (ref 3–11)
ANION GAP SERPL CALCULATED.3IONS-SCNC: ABNORMAL MMOL/L (ref 3.6–11.2)
ANTI-D, PASSIVE: NORMAL
ARTERIAL PATENCY WRIST A: ABNORMAL
ARTERIAL PATENCY WRIST A: POSITIVE
AST SERPL-CCNC: 19 U/L (ref 0–33)
AST SERPL-CCNC: 20 U/L (ref 10–30)
ATMOSPHERIC PRESS: 718 MMHG
ATMOSPHERIC PRESS: 718 MMHG
BACTERIA UR QL AUTO: ABNORMAL /HPF
BACTERIA UR QL AUTO: ABNORMAL /HPF
BASE EXCESS BLDA CALC-SCNC: -22 MMOL/L
BASE EXCESS BLDA CALC-SCNC: -23 MMOL/L
BDY SITE: ABNORMAL
BDY SITE: ABNORMAL
BILIRUB SERPL-MCNC: 0.3 MG/DL (ref 0.3–1.2)
BILIRUB SERPL-MCNC: 0.4 MG/DL (ref 0.2–1.8)
BILIRUB UR QL STRIP: ABNORMAL
BILIRUB UR QL STRIP: NEGATIVE
BLD GP AB SCN SERPL QL: POSITIVE
BLD GP AB SCN SERPL QL: POSITIVE
BODY TEMPERATURE: 98.5 C
BODY TEMPERATURE: 98.6 C
BUN BLD-MCNC: 15 MG/DL (ref 9–23)
BUN BLD-MCNC: 15 MG/DL (ref 9–23)
BUN BLD-MCNC: 16 MG/DL (ref 7–21)
BUN BLD-MCNC: 19 MG/DL (ref 9–23)
BUN/CREAT SERPL: 15.2 (ref 7–25)
BUN/CREAT SERPL: 16.7 (ref 7–25)
BUN/CREAT SERPL: 18.8 (ref 7–25)
BUN/CREAT SERPL: 21.1 (ref 7–25)
CA-I SERPL ISE-MCNC: 1.29 MMOL/L (ref 1.12–1.32)
CA-I SERPL ISE-MCNC: 1.33 MMOL/L (ref 1.12–1.32)
CALCIUM SPEC-SCNC: 8.4 MG/DL (ref 8.7–10.4)
CALCIUM SPEC-SCNC: 8.6 MG/DL (ref 8.7–10.4)
CALCIUM SPEC-SCNC: 8.9 MG/DL (ref 8.7–10.4)
CALCIUM SPEC-SCNC: 9.3 MG/DL (ref 7.7–10)
CHLORIDE SERPL-SCNC: 104 MMOL/L (ref 99–112)
CHLORIDE SERPL-SCNC: 106 MMOL/L (ref 99–109)
CHLORIDE SERPL-SCNC: 109 MMOL/L (ref 99–109)
CHLORIDE SERPL-SCNC: 111 MMOL/L (ref 99–109)
CHLORIDE SERPL-SCNC: 111 MMOL/L (ref 99–109)
CLARITY UR: ABNORMAL
CLARITY UR: ABNORMAL
CLARITY UR: CLEAR
CLARITY UR: CLEAR
CO2 SERPL-SCNC: 11 MMOL/L (ref 20–31)
CO2 SERPL-SCNC: 16 MMOL/L (ref 20–31)
CO2 SERPL-SCNC: <10 MMOL/L (ref 24.3–31.9)
COHGB MFR BLD: 0.3 % (ref 0–5)
COHGB MFR BLD: 0.6 % (ref 0–5)
COLOR UR: YELLOW
CREAT BLD-MCNC: 0.8 MG/DL (ref 0.6–1.3)
CREAT BLD-MCNC: 0.9 MG/DL (ref 0.6–1.3)
CREAT BLD-MCNC: 0.9 MG/DL (ref 0.6–1.3)
CREAT BLD-MCNC: 1.05 MG/DL (ref 0.43–1.29)
D-LACTATE SERPL-SCNC: 0.9 MMOL/L (ref 0.5–2)
DEPRECATED RDW RBC AUTO: 47.3 FL (ref 37–54)
ERYTHROCYTE [DISTWIDTH] IN BLOOD BY AUTOMATED COUNT: 15.3 % (ref 11.5–14.5)
FETAL BLEED: NEGATIVE
GFR SERPL CREATININE-BSD FRML MDRD: 67 ML/MIN/1.73
GFR SERPL CREATININE-BSD FRML MDRD: 80 ML/MIN/1.73
GFR SERPL CREATININE-BSD FRML MDRD: 80 ML/MIN/1.73
GFR SERPL CREATININE-BSD FRML MDRD: 91 ML/MIN/1.73
GLOBULIN UR ELPH-MCNC: 2.4 GM/DL
GLOBULIN UR ELPH-MCNC: 3 GM/DL
GLUCOSE BLD-MCNC: 180 MG/DL (ref 70–100)
GLUCOSE BLD-MCNC: 292 MG/DL (ref 70–100)
GLUCOSE BLD-MCNC: 369 MG/DL (ref 70–110)
GLUCOSE BLD-MCNC: 76 MG/DL (ref 70–100)
GLUCOSE BLDC GLUCOMTR-MCNC: 125 MG/DL (ref 70–130)
GLUCOSE BLDC GLUCOMTR-MCNC: 156 MG/DL (ref 70–130)
GLUCOSE BLDC GLUCOMTR-MCNC: 158 MG/DL (ref 70–130)
GLUCOSE BLDC GLUCOMTR-MCNC: 161 MG/DL (ref 70–130)
GLUCOSE BLDC GLUCOMTR-MCNC: 167 MG/DL (ref 70–130)
GLUCOSE BLDC GLUCOMTR-MCNC: 167 MG/DL (ref 70–130)
GLUCOSE BLDC GLUCOMTR-MCNC: 181 MG/DL (ref 70–130)
GLUCOSE BLDC GLUCOMTR-MCNC: 199 MG/DL (ref 70–130)
GLUCOSE BLDC GLUCOMTR-MCNC: 207 MG/DL (ref 70–130)
GLUCOSE BLDC GLUCOMTR-MCNC: 208 MG/DL (ref 70–130)
GLUCOSE BLDC GLUCOMTR-MCNC: 223 MG/DL (ref 70–130)
GLUCOSE BLDC GLUCOMTR-MCNC: 240 MG/DL (ref 70–130)
GLUCOSE BLDC GLUCOMTR-MCNC: 241 MG/DL (ref 70–130)
GLUCOSE BLDC GLUCOMTR-MCNC: 277 MG/DL (ref 70–130)
GLUCOSE BLDC GLUCOMTR-MCNC: 303 MG/DL (ref 70–130)
GLUCOSE BLDC GLUCOMTR-MCNC: 329 MG/DL (ref 70–130)
GLUCOSE BLDC GLUCOMTR-MCNC: 331 MG/DL (ref 70–130)
GLUCOSE BLDC GLUCOMTR-MCNC: 332 MG/DL (ref 70–130)
GLUCOSE BLDC GLUCOMTR-MCNC: 333 MG/DL (ref 70–130)
GLUCOSE BLDC GLUCOMTR-MCNC: 344 MG/DL (ref 70–130)
GLUCOSE BLDC GLUCOMTR-MCNC: 61 MG/DL (ref 70–130)
GLUCOSE BLDC GLUCOMTR-MCNC: 66 MG/DL (ref 70–130)
GLUCOSE BLDC GLUCOMTR-MCNC: 76 MG/DL (ref 70–130)
GLUCOSE BLDC GLUCOMTR-MCNC: 85 MG/DL (ref 70–130)
GLUCOSE UR STRIP-MCNC: ABNORMAL MG/DL
HBA1C MFR BLD: 8.9 % (ref 4.8–5.6)
HCO3 BLDA-SCNC: 3.2 MMOL/L (ref 22–26)
HCO3 BLDA-SCNC: 3.5 MMOL/L (ref 22–26)
HCT VFR BLD AUTO: 36.2 % (ref 37–47)
HCT VFR BLD CALC: 34 % (ref 37–47)
HCT VFR BLD CALC: 35 % (ref 37–47)
HGB BLD-MCNC: 11 G/DL (ref 12–16)
HGB BLDA-MCNC: 11.6 G/DL (ref 12–16)
HGB BLDA-MCNC: 12 G/DL (ref 12–16)
HGB UR QL STRIP.AUTO: ABNORMAL
HGB UR QL STRIP.AUTO: NEGATIVE
HOROWITZ INDEX BLD+IHG-RTO: 21 %
HOROWITZ INDEX BLD+IHG-RTO: 21 %
HYALINE CASTS UR QL AUTO: ABNORMAL /LPF
HYALINE CASTS UR QL AUTO: ABNORMAL /LPF
KETONES UR QL STRIP: ABNORMAL
LEUKOCYTE ESTERASE UR QL STRIP.AUTO: NEGATIVE
MAGNESIUM SERPL-MCNC: 1.2 MG/DL (ref 1.3–2.7)
MAGNESIUM SERPL-MCNC: 1.4 MG/DL (ref 1.6–2.2)
MCH RBC QN AUTO: 26.2 PG (ref 27–33)
MCHC RBC AUTO-ENTMCNC: 30.4 G/DL (ref 33–37)
MCV RBC AUTO: 86.2 FL (ref 80–94)
METHGB BLD QL: 0.3 % (ref 0–3)
METHGB BLD QL: 0.4 % (ref 0–3)
MODALITY: ABNORMAL
MODALITY: ABNORMAL
NITRITE UR QL STRIP: NEGATIVE
NUMBER OF DOSES: NORMAL
OSMOLALITY SERPL CALC.SUM OF ELEC: 288.2 MOSM/KG (ref 273–305)
OXYHGB MFR BLDV: 96.9 % (ref 85–100)
OXYHGB MFR BLDV: 97.3 % (ref 85–100)
PCO2 BLDA: 9.1 MM HG (ref 35–45)
PCO2 BLDA: 9.4 MM HG (ref 35–45)
PH BLDA: 7.16 PH UNITS (ref 7.35–7.45)
PH BLDA: 7.19 PH UNITS (ref 7.35–7.45)
PH UR STRIP.AUTO: 5.5 [PH] (ref 5–8)
PH UR STRIP.AUTO: 5.5 [PH] (ref 5–8)
PH UR STRIP.AUTO: 6 [PH] (ref 5–8)
PH UR STRIP.AUTO: <=5 [PH] (ref 5–8)
PHOSPHATE SERPL-MCNC: 2.8 MG/DL (ref 2.4–5.1)
PHOSPHATE SERPL-MCNC: 3.5 MG/DL (ref 2.4–5.1)
PHOSPHATE SERPL-MCNC: 5.1 MG/DL (ref 2.7–4.5)
PLATELET # BLD AUTO: 230 10*3/MM3 (ref 130–400)
PMV BLD AUTO: 13.6 FL (ref 6–10)
PO2 BLDA: 126.9 MM HG (ref 80–100)
PO2 BLDA: 136 MM HG (ref 80–100)
POTASSIUM BLD-SCNC: 3.9 MMOL/L (ref 3.5–5.5)
POTASSIUM BLD-SCNC: 4.2 MMOL/L (ref 3.5–5.5)
POTASSIUM BLD-SCNC: 4.3 MMOL/L (ref 3.5–5.5)
POTASSIUM BLD-SCNC: 4.4 MMOL/L (ref 3.5–5.5)
POTASSIUM BLD-SCNC: 4.6 MMOL/L (ref 3.5–5.3)
PROT SERPL-MCNC: 5.4 G/DL (ref 5.7–8.2)
PROT SERPL-MCNC: 6.6 G/DL (ref 6–8)
PROT UR QL STRIP: ABNORMAL
RBC # BLD AUTO: 4.2 10*6/MM3 (ref 4.2–5.4)
RBC # UR: ABNORMAL /HPF
RBC # UR: ABNORMAL /HPF
REF LAB TEST METHOD: ABNORMAL
REF LAB TEST METHOD: ABNORMAL
RESIDUAL RHIG DETECTED: NORMAL
RH BLD: NEGATIVE
SAO2 % BLDCOA: 97.8 % (ref 90–100)
SAO2 % BLDCOA: 98 % (ref 90–100)
SODIUM BLD-SCNC: 132 MMOL/L (ref 132–146)
SODIUM BLD-SCNC: 134 MMOL/L (ref 132–146)
SODIUM BLD-SCNC: 135 MMOL/L (ref 132–146)
SODIUM BLD-SCNC: 136 MMOL/L (ref 135–153)
SODIUM BLD-SCNC: 140 MMOL/L (ref 132–146)
SP GR UR STRIP: 1.02 (ref 1–1.03)
SQUAMOUS #/AREA URNS HPF: ABNORMAL /HPF
SQUAMOUS #/AREA URNS HPF: ABNORMAL /HPF
UROBILINOGEN UR QL STRIP: ABNORMAL
WBC NRBC COR # BLD: 15.7 10*3/MM3 (ref 4.5–12.5)
WBC UR QL AUTO: ABNORMAL /HPF
WBC UR QL AUTO: ABNORMAL /HPF

## 2017-12-15 PROCEDURE — 83050 HGB METHEMOGLOBIN QUAN: CPT | Performed by: HOSPITALIST

## 2017-12-15 PROCEDURE — 25010000002 RHO D IMMUNE GLOBULIN 1500 UNIT/2ML SOLUTION PREFILLED SYRINGE: Performed by: OBSTETRICS & GYNECOLOGY

## 2017-12-15 PROCEDURE — 86850 RBC ANTIBODY SCREEN: CPT | Performed by: OBSTETRICS & GYNECOLOGY

## 2017-12-15 PROCEDURE — 25010000003 MORPHINE PER 10 MG: Performed by: ANESTHESIOLOGY

## 2017-12-15 PROCEDURE — 83605 ASSAY OF LACTIC ACID: CPT | Performed by: OBSTETRICS & GYNECOLOGY

## 2017-12-15 PROCEDURE — 82375 ASSAY CARBOXYHB QUANT: CPT | Performed by: HOSPITALIST

## 2017-12-15 PROCEDURE — 59025 FETAL NON-STRESS TEST: CPT

## 2017-12-15 PROCEDURE — 25010000002 ONDANSETRON PER 1 MG

## 2017-12-15 PROCEDURE — 82962 GLUCOSE BLOOD TEST: CPT

## 2017-12-15 PROCEDURE — 25010000002 MAGNESIUM SULFATE 2 GM/50ML SOLUTION: Performed by: OBSTETRICS & GYNECOLOGY

## 2017-12-15 PROCEDURE — 86870 RBC ANTIBODY IDENTIFICATION: CPT | Performed by: OBSTETRICS & GYNECOLOGY

## 2017-12-15 PROCEDURE — 87086 URINE CULTURE/COLONY COUNT: CPT | Performed by: OBSTETRICS & GYNECOLOGY

## 2017-12-15 PROCEDURE — 83735 ASSAY OF MAGNESIUM: CPT | Performed by: HOSPITALIST

## 2017-12-15 PROCEDURE — 86900 BLOOD TYPING SEROLOGIC ABO: CPT | Performed by: OBSTETRICS & GYNECOLOGY

## 2017-12-15 PROCEDURE — 83735 ASSAY OF MAGNESIUM: CPT | Performed by: OBSTETRICS & GYNECOLOGY

## 2017-12-15 PROCEDURE — 25010000002 DIPHENHYDRAMINE PER 50 MG: Performed by: OBSTETRICS & GYNECOLOGY

## 2017-12-15 PROCEDURE — 99255 IP/OBS CONSLTJ NEW/EST HI 80: CPT | Performed by: HOSPITALIST

## 2017-12-15 PROCEDURE — 86901 BLOOD TYPING SEROLOGIC RH(D): CPT | Performed by: OBSTETRICS & GYNECOLOGY

## 2017-12-15 PROCEDURE — 84100 ASSAY OF PHOSPHORUS: CPT | Performed by: HOSPITALIST

## 2017-12-15 PROCEDURE — 81001 URINALYSIS AUTO W/SCOPE: CPT | Performed by: OBSTETRICS & GYNECOLOGY

## 2017-12-15 PROCEDURE — 63710000001 INSULIN ASPART PER 5 UNITS: Performed by: OBSTETRICS & GYNECOLOGY

## 2017-12-15 PROCEDURE — 80053 COMPREHEN METABOLIC PANEL: CPT | Performed by: HOSPITALIST

## 2017-12-15 PROCEDURE — 36600 WITHDRAWAL OF ARTERIAL BLOOD: CPT | Performed by: HOSPITALIST

## 2017-12-15 PROCEDURE — 3E0234Z INTRODUCTION OF SERUM, TOXOID AND VACCINE INTO MUSCLE, PERCUTANEOUS APPROACH: ICD-10-PCS | Performed by: OBSTETRICS & GYNECOLOGY

## 2017-12-15 PROCEDURE — G0463 HOSPITAL OUTPT CLINIC VISIT: HCPCS

## 2017-12-15 PROCEDURE — 83036 HEMOGLOBIN GLYCOSYLATED A1C: CPT | Performed by: OBSTETRICS & GYNECOLOGY

## 2017-12-15 PROCEDURE — 82330 ASSAY OF CALCIUM: CPT | Performed by: OBSTETRICS & GYNECOLOGY

## 2017-12-15 PROCEDURE — 80051 ELECTROLYTE PANEL: CPT | Performed by: OBSTETRICS & GYNECOLOGY

## 2017-12-15 PROCEDURE — 85461 HEMOGLOBIN FETAL: CPT | Performed by: OBSTETRICS & GYNECOLOGY

## 2017-12-15 PROCEDURE — 81003 URINALYSIS AUTO W/O SCOPE: CPT | Performed by: OBSTETRICS & GYNECOLOGY

## 2017-12-15 PROCEDURE — 25010000002 MAGNESIUM SULFATE IN D5W 1G/100ML (PREMIX) 1-5 GM/100ML-% SOLUTION: Performed by: HOSPITALIST

## 2017-12-15 PROCEDURE — 85027 COMPLETE CBC AUTOMATED: CPT | Performed by: OBSTETRICS & GYNECOLOGY

## 2017-12-15 PROCEDURE — 80053 COMPREHEN METABOLIC PANEL: CPT | Performed by: OBSTETRICS & GYNECOLOGY

## 2017-12-15 PROCEDURE — 87086 URINE CULTURE/COLONY COUNT: CPT | Performed by: HOSPITALIST

## 2017-12-15 PROCEDURE — 99221 1ST HOSP IP/OBS SF/LOW 40: CPT | Performed by: OBSTETRICS & GYNECOLOGY

## 2017-12-15 PROCEDURE — 81001 URINALYSIS AUTO W/SCOPE: CPT | Performed by: HOSPITALIST

## 2017-12-15 PROCEDURE — 84100 ASSAY OF PHOSPHORUS: CPT | Performed by: OBSTETRICS & GYNECOLOGY

## 2017-12-15 PROCEDURE — 82805 BLOOD GASES W/O2 SATURATION: CPT | Performed by: HOSPITALIST

## 2017-12-15 PROCEDURE — 63710000001 INSULIN REGULAR HUMAN PER 5 UNITS: Performed by: HOSPITALIST

## 2017-12-15 PROCEDURE — 63710000001 INSULIN LISPRO (HUMAN) PER 5 UNITS: Performed by: OBSTETRICS & GYNECOLOGY

## 2017-12-15 PROCEDURE — 82009 KETONE BODYS QUAL: CPT | Performed by: HOSPITALIST

## 2017-12-15 PROCEDURE — 25010000002 INSULIN REGULAR HUMAN PER 5 UNITS: Performed by: OBSTETRICS & GYNECOLOGY

## 2017-12-15 PROCEDURE — 25010000002 FENTANYL CITRATE (PF) 100 MCG/2ML SOLUTION: Performed by: ANESTHESIOLOGY

## 2017-12-15 PROCEDURE — G0108 DIAB MANAGE TRN  PER INDIV: HCPCS | Performed by: REGISTERED NURSE

## 2017-12-15 RX ORDER — NICOTINE POLACRILEX 4 MG
15 LOZENGE BUCCAL
Status: DISCONTINUED | OUTPATIENT
Start: 2017-12-15 | End: 2017-12-15 | Stop reason: HOSPADM

## 2017-12-15 RX ORDER — POTASSIUM CHLORIDE 750 MG/1
10 CAPSULE, EXTENDED RELEASE ORAL AS NEEDED
Status: DISCONTINUED | OUTPATIENT
Start: 2017-12-15 | End: 2017-12-19 | Stop reason: HOSPADM

## 2017-12-15 RX ORDER — POTASSIUM CHLORIDE 7.46 G/1000ML
10 INJECTION, SOLUTION INTRAVENOUS
Status: DISCONTINUED | OUTPATIENT
Start: 2017-12-15 | End: 2017-12-19 | Stop reason: HOSPADM

## 2017-12-15 RX ORDER — FENTANYL CITRATE 50 UG/ML
50 INJECTION, SOLUTION INTRAMUSCULAR; INTRAVENOUS
Status: CANCELLED | OUTPATIENT
Start: 2017-12-15

## 2017-12-15 RX ORDER — SODIUM CHLORIDE, SODIUM LACTATE, POTASSIUM CHLORIDE, CALCIUM CHLORIDE 600; 310; 30; 20 MG/100ML; MG/100ML; MG/100ML; MG/100ML
125 INJECTION, SOLUTION INTRAVENOUS CONTINUOUS
Status: DISCONTINUED | OUTPATIENT
Start: 2017-12-15 | End: 2017-12-15

## 2017-12-15 RX ORDER — CARBOPROST TROMETHAMINE 250 UG/ML
250 INJECTION, SOLUTION INTRAMUSCULAR AS NEEDED
Status: CANCELLED | OUTPATIENT
Start: 2017-12-15

## 2017-12-15 RX ORDER — MORPHINE SULFATE 0.5 MG/ML
INJECTION, SOLUTION EPIDURAL; INTRATHECAL; INTRAVENOUS AS NEEDED
Status: DISCONTINUED | OUTPATIENT
Start: 2017-12-15 | End: 2017-12-15 | Stop reason: SURG

## 2017-12-15 RX ORDER — MAGNESIUM SULFATE HEPTAHYDRATE 40 MG/ML
2 INJECTION, SOLUTION INTRAVENOUS AS NEEDED
Status: DISCONTINUED | OUTPATIENT
Start: 2017-12-15 | End: 2017-12-15 | Stop reason: HOSPADM

## 2017-12-15 RX ORDER — ONDANSETRON 2 MG/ML
4 INJECTION INTRAMUSCULAR; INTRAVENOUS ONCE AS NEEDED
Status: CANCELLED | OUTPATIENT
Start: 2017-12-15

## 2017-12-15 RX ORDER — POTASSIUM CHLORIDE 750 MG/1
40 CAPSULE, EXTENDED RELEASE ORAL AS NEEDED
Status: DISCONTINUED | OUTPATIENT
Start: 2017-12-15 | End: 2017-12-19 | Stop reason: HOSPADM

## 2017-12-15 RX ORDER — POTASSIUM CHLORIDE 750 MG/1
20 CAPSULE, EXTENDED RELEASE ORAL AS NEEDED
Status: DISCONTINUED | OUTPATIENT
Start: 2017-12-15 | End: 2017-12-19 | Stop reason: HOSPADM

## 2017-12-15 RX ORDER — MAGNESIUM SULFATE HEPTAHYDRATE 40 MG/ML
2 INJECTION, SOLUTION INTRAVENOUS AS NEEDED
Status: DISCONTINUED | OUTPATIENT
Start: 2017-12-15 | End: 2017-12-15

## 2017-12-15 RX ORDER — SIMETHICONE 80 MG
80 TABLET,CHEWABLE ORAL 4 TIMES DAILY PRN
Status: DISCONTINUED | OUTPATIENT
Start: 2017-12-15 | End: 2017-12-19 | Stop reason: HOSPADM

## 2017-12-15 RX ORDER — SODIUM CHLORIDE 9 MG/ML
INJECTION, SOLUTION INTRAVENOUS
Status: DISCONTINUED
Start: 2017-12-15 | End: 2017-12-15 | Stop reason: HOSPADM

## 2017-12-15 RX ORDER — NICOTINE POLACRILEX 4 MG
15 LOZENGE BUCCAL
Status: DISCONTINUED | OUTPATIENT
Start: 2017-12-15 | End: 2017-12-19 | Stop reason: HOSPADM

## 2017-12-15 RX ORDER — MAGNESIUM SULFATE 1 G/100ML
1 INJECTION INTRAVENOUS
Status: DISCONTINUED | OUTPATIENT
Start: 2017-12-15 | End: 2017-12-15 | Stop reason: HOSPADM

## 2017-12-15 RX ORDER — DEXTROSE MONOHYDRATE 25 G/50ML
12.5 INJECTION, SOLUTION INTRAVENOUS
Status: DISCONTINUED | OUTPATIENT
Start: 2017-12-15 | End: 2017-12-15 | Stop reason: HOSPADM

## 2017-12-15 RX ORDER — FENTANYL CITRATE 50 UG/ML
INJECTION, SOLUTION INTRAMUSCULAR; INTRAVENOUS AS NEEDED
Status: DISCONTINUED | OUTPATIENT
Start: 2017-12-15 | End: 2017-12-15 | Stop reason: SURG

## 2017-12-15 RX ORDER — MEPERIDINE HYDROCHLORIDE 25 MG/ML
12.5 INJECTION INTRAMUSCULAR; INTRAVENOUS; SUBCUTANEOUS
Status: CANCELLED | OUTPATIENT
Start: 2017-12-15 | End: 2017-12-16

## 2017-12-15 RX ORDER — SODIUM CHLORIDE AND POTASSIUM CHLORIDE 150; 450 MG/100ML; MG/100ML
250 INJECTION, SOLUTION INTRAVENOUS CONTINUOUS PRN
Status: DISCONTINUED | OUTPATIENT
Start: 2017-12-15 | End: 2017-12-19 | Stop reason: HOSPADM

## 2017-12-15 RX ORDER — DEXTROSE MONOHYDRATE 25 G/50ML
12.5 INJECTION, SOLUTION INTRAVENOUS
Status: DISCONTINUED | OUTPATIENT
Start: 2017-12-15 | End: 2017-12-19 | Stop reason: HOSPADM

## 2017-12-15 RX ORDER — SODIUM CHLORIDE AND POTASSIUM CHLORIDE 150; 450 MG/100ML; MG/100ML
250 INJECTION, SOLUTION INTRAVENOUS CONTINUOUS PRN
Status: DISCONTINUED | OUTPATIENT
Start: 2017-12-15 | End: 2017-12-15 | Stop reason: HOSPADM

## 2017-12-15 RX ORDER — OXYTOCIN/RINGER'S LACTATE 20/1000 ML
2 PLASTIC BAG, INJECTION (ML) INTRAVENOUS CONTINUOUS
Status: CANCELLED | OUTPATIENT
Start: 2017-12-15

## 2017-12-15 RX ORDER — DEXTROSE MONOHYDRATE 25 G/50ML
25 INJECTION, SOLUTION INTRAVENOUS
Status: DISCONTINUED | OUTPATIENT
Start: 2017-12-15 | End: 2017-12-15 | Stop reason: HOSPADM

## 2017-12-15 RX ORDER — SODIUM CHLORIDE 0.9 % (FLUSH) 0.9 %
1-10 SYRINGE (ML) INJECTION AS NEEDED
Status: DISCONTINUED | OUTPATIENT
Start: 2017-12-15 | End: 2017-12-15 | Stop reason: HOSPADM

## 2017-12-15 RX ORDER — MAGNESIUM HYDROXIDE 1200 MG/15ML
1000 LIQUID ORAL ONCE AS NEEDED
Status: DISCONTINUED | OUTPATIENT
Start: 2017-12-15 | End: 2017-12-15 | Stop reason: HOSPADM

## 2017-12-15 RX ORDER — SODIUM CHLORIDE 9 MG/ML
INJECTION, SOLUTION INTRAVENOUS
Status: COMPLETED
Start: 2017-12-15 | End: 2017-12-15

## 2017-12-15 RX ORDER — MAGNESIUM SULFATE 1 G/100ML
1 INJECTION INTRAVENOUS AS NEEDED
Status: DISCONTINUED | OUTPATIENT
Start: 2017-12-15 | End: 2017-12-15 | Stop reason: HOSPADM

## 2017-12-15 RX ORDER — METHYLERGONOVINE MALEATE 0.2 MG/ML
INJECTION INTRAVENOUS
Status: DISCONTINUED
Start: 2017-12-15 | End: 2017-12-15 | Stop reason: HOSPADM

## 2017-12-15 RX ORDER — ACETAMINOPHEN 325 MG/1
650 TABLET ORAL EVERY 4 HOURS PRN
Status: DISCONTINUED | OUTPATIENT
Start: 2017-12-15 | End: 2017-12-19 | Stop reason: HOSPADM

## 2017-12-15 RX ORDER — PROMETHAZINE HYDROCHLORIDE 12.5 MG/1
12.5 SUPPOSITORY RECTAL EVERY 6 HOURS PRN
Status: DISCONTINUED | OUTPATIENT
Start: 2017-12-15 | End: 2017-12-15 | Stop reason: HOSPADM

## 2017-12-15 RX ORDER — SODIUM CHLORIDE 450 MG/100ML
250 INJECTION, SOLUTION INTRAVENOUS CONTINUOUS
Status: DISCONTINUED | OUTPATIENT
Start: 2017-12-15 | End: 2017-12-18

## 2017-12-15 RX ORDER — DIPHENHYDRAMINE HYDROCHLORIDE 50 MG/ML
25 INJECTION INTRAMUSCULAR; INTRAVENOUS NIGHTLY PRN
Status: DISCONTINUED | OUTPATIENT
Start: 2017-12-15 | End: 2017-12-15 | Stop reason: HOSPADM

## 2017-12-15 RX ORDER — LIDOCAINE HYDROCHLORIDE 10 MG/ML
5 INJECTION, SOLUTION INFILTRATION; PERINEURAL AS NEEDED
Status: DISCONTINUED | OUTPATIENT
Start: 2017-12-15 | End: 2017-12-15 | Stop reason: HOSPADM

## 2017-12-15 RX ORDER — DIPHENHYDRAMINE HYDROCHLORIDE 50 MG/ML
25 INJECTION INTRAMUSCULAR; INTRAVENOUS EVERY 6 HOURS PRN
Status: DISCONTINUED | OUTPATIENT
Start: 2017-12-15 | End: 2017-12-19 | Stop reason: HOSPADM

## 2017-12-15 RX ORDER — METHYLERGONOVINE MALEATE 0.2 MG/ML
200 INJECTION INTRAVENOUS ONCE AS NEEDED
Status: CANCELLED | OUTPATIENT
Start: 2017-12-15

## 2017-12-15 RX ORDER — ONDANSETRON 2 MG/ML
4 INJECTION INTRAMUSCULAR; INTRAVENOUS EVERY 6 HOURS PRN
Status: DISCONTINUED | OUTPATIENT
Start: 2017-12-15 | End: 2017-12-15 | Stop reason: HOSPADM

## 2017-12-15 RX ORDER — MAGNESIUM SULFATE HEPTAHYDRATE 40 MG/ML
4 INJECTION, SOLUTION INTRAVENOUS AS NEEDED
Status: DISCONTINUED | OUTPATIENT
Start: 2017-12-15 | End: 2017-12-15

## 2017-12-15 RX ORDER — SODIUM CHLORIDE, SODIUM LACTATE, POTASSIUM CHLORIDE, CALCIUM CHLORIDE 600; 310; 30; 20 MG/100ML; MG/100ML; MG/100ML; MG/100ML
125 INJECTION, SOLUTION INTRAVENOUS CONTINUOUS
Status: DISCONTINUED | OUTPATIENT
Start: 2017-12-15 | End: 2017-12-16

## 2017-12-15 RX ORDER — MISOPROSTOL 100 UG/1
800 TABLET ORAL AS NEEDED
Status: CANCELLED | OUTPATIENT
Start: 2017-12-15

## 2017-12-15 RX ORDER — IPRATROPIUM BROMIDE AND ALBUTEROL SULFATE 2.5; .5 MG/3ML; MG/3ML
3 SOLUTION RESPIRATORY (INHALATION) ONCE AS NEEDED
Status: CANCELLED | OUTPATIENT
Start: 2017-12-15

## 2017-12-15 RX ORDER — FAMOTIDINE 10 MG/ML
20 INJECTION, SOLUTION INTRAVENOUS EVERY 12 HOURS SCHEDULED
Status: CANCELLED | OUTPATIENT
Start: 2017-12-15

## 2017-12-15 RX ORDER — ONDANSETRON 2 MG/ML
INJECTION INTRAMUSCULAR; INTRAVENOUS
Status: COMPLETED
Start: 2017-12-15 | End: 2017-12-15

## 2017-12-15 RX ORDER — MISOPROSTOL 100 UG/1
600 TABLET ORAL ONCE
Status: DISCONTINUED | OUTPATIENT
Start: 2017-12-15 | End: 2017-12-15 | Stop reason: HOSPADM

## 2017-12-15 RX ORDER — PRENATAL VIT/IRON FUM/FOLIC AC 27MG-0.8MG
1 TABLET ORAL DAILY
Status: DISCONTINUED | OUTPATIENT
Start: 2017-12-15 | End: 2017-12-19 | Stop reason: HOSPADM

## 2017-12-15 RX ORDER — OXYTOCIN/RINGER'S LACTATE 20/1000 ML
PLASTIC BAG, INJECTION (ML) INTRAVENOUS
Status: DISCONTINUED
Start: 2017-12-15 | End: 2017-12-15 | Stop reason: HOSPADM

## 2017-12-15 RX ORDER — TERBUTALINE SULFATE 1 MG/ML
0.25 INJECTION, SOLUTION SUBCUTANEOUS AS NEEDED
Status: DISCONTINUED | OUTPATIENT
Start: 2017-12-15 | End: 2017-12-15 | Stop reason: HOSPADM

## 2017-12-15 RX ORDER — SODIUM CHLORIDE 450 MG/100ML
125 INJECTION, SOLUTION INTRAVENOUS CONTINUOUS
Status: DISCONTINUED | OUTPATIENT
Start: 2017-12-15 | End: 2017-12-15

## 2017-12-15 RX ORDER — ACETAMINOPHEN 325 MG/1
650 TABLET ORAL EVERY 4 HOURS PRN
Status: DISCONTINUED | OUTPATIENT
Start: 2017-12-15 | End: 2017-12-15 | Stop reason: HOSPADM

## 2017-12-15 RX ORDER — IBUPROFEN 600 MG/1
600 TABLET ORAL EVERY 6 HOURS PRN
Status: DISCONTINUED | OUTPATIENT
Start: 2017-12-15 | End: 2017-12-19 | Stop reason: HOSPADM

## 2017-12-15 RX ORDER — DEXTROSE AND SODIUM CHLORIDE 5; .45 G/100ML; G/100ML
150 INJECTION, SOLUTION INTRAVENOUS CONTINUOUS PRN
Status: DISCONTINUED | OUTPATIENT
Start: 2017-12-15 | End: 2017-12-19 | Stop reason: HOSPADM

## 2017-12-15 RX ORDER — BUTORPHANOL TARTRATE 1 MG/ML
1 INJECTION, SOLUTION INTRAMUSCULAR; INTRAVENOUS
Status: DISCONTINUED | OUTPATIENT
Start: 2017-12-15 | End: 2017-12-15 | Stop reason: HOSPADM

## 2017-12-15 RX ORDER — ONDANSETRON 4 MG/1
4 TABLET, FILM COATED ORAL EVERY 6 HOURS PRN
Status: DISCONTINUED | OUTPATIENT
Start: 2017-12-15 | End: 2017-12-15 | Stop reason: HOSPADM

## 2017-12-15 RX ORDER — DIPHENHYDRAMINE HCL 25 MG
25 CAPSULE ORAL NIGHTLY PRN
Status: DISCONTINUED | OUTPATIENT
Start: 2017-12-15 | End: 2017-12-15 | Stop reason: HOSPADM

## 2017-12-15 RX ORDER — SODIUM CHLORIDE 9 MG/ML
125 INJECTION, SOLUTION INTRAVENOUS CONTINUOUS
Status: DISCONTINUED | OUTPATIENT
Start: 2017-12-15 | End: 2017-12-15

## 2017-12-15 RX ORDER — DEXTROSE AND SODIUM CHLORIDE 5; .45 G/100ML; G/100ML
150 INJECTION, SOLUTION INTRAVENOUS CONTINUOUS PRN
Status: DISCONTINUED | OUTPATIENT
Start: 2017-12-15 | End: 2017-12-15 | Stop reason: HOSPADM

## 2017-12-15 RX ORDER — DEXTROSE, SODIUM CHLORIDE, AND POTASSIUM CHLORIDE 5; .45; .15 G/100ML; G/100ML; G/100ML
150 INJECTION INTRAVENOUS CONTINUOUS PRN
Status: DISCONTINUED | OUTPATIENT
Start: 2017-12-15 | End: 2017-12-19 | Stop reason: HOSPADM

## 2017-12-15 RX ORDER — MAGNESIUM SULFATE HEPTAHYDRATE 40 MG/ML
4 INJECTION, SOLUTION INTRAVENOUS AS NEEDED
Status: DISCONTINUED | OUTPATIENT
Start: 2017-12-15 | End: 2017-12-15 | Stop reason: HOSPADM

## 2017-12-15 RX ORDER — DEXTROSE, SODIUM CHLORIDE, AND POTASSIUM CHLORIDE 5; .45; .15 G/100ML; G/100ML; G/100ML
150 INJECTION INTRAVENOUS CONTINUOUS PRN
Status: DISCONTINUED | OUTPATIENT
Start: 2017-12-15 | End: 2017-12-15 | Stop reason: HOSPADM

## 2017-12-15 RX ORDER — DOCUSATE SODIUM 100 MG/1
100 CAPSULE, LIQUID FILLED ORAL 2 TIMES DAILY PRN
Status: DISCONTINUED | OUTPATIENT
Start: 2017-12-15 | End: 2017-12-19 | Stop reason: HOSPADM

## 2017-12-15 RX ORDER — POTASSIUM CHLORIDE 1.5 G/1.77G
10 POWDER, FOR SOLUTION ORAL AS NEEDED
Status: DISCONTINUED | OUTPATIENT
Start: 2017-12-15 | End: 2017-12-19 | Stop reason: HOSPADM

## 2017-12-15 RX ORDER — MAGNESIUM SULFATE HEPTAHYDRATE 40 MG/ML
2 INJECTION, SOLUTION INTRAVENOUS
Status: COMPLETED | OUTPATIENT
Start: 2017-12-15 | End: 2017-12-15

## 2017-12-15 RX ORDER — ONDANSETRON 4 MG/1
4 TABLET, ORALLY DISINTEGRATING ORAL EVERY 6 HOURS PRN
Status: DISCONTINUED | OUTPATIENT
Start: 2017-12-15 | End: 2017-12-15 | Stop reason: HOSPADM

## 2017-12-15 RX ORDER — SODIUM CHLORIDE 450 MG/100ML
250 INJECTION, SOLUTION INTRAVENOUS CONTINUOUS
Status: DISCONTINUED | OUTPATIENT
Start: 2017-12-15 | End: 2017-12-15 | Stop reason: HOSPADM

## 2017-12-15 RX ORDER — PROMETHAZINE HYDROCHLORIDE 25 MG/ML
12.5 INJECTION, SOLUTION INTRAMUSCULAR; INTRAVENOUS EVERY 6 HOURS PRN
Status: DISCONTINUED | OUTPATIENT
Start: 2017-12-15 | End: 2017-12-15 | Stop reason: HOSPADM

## 2017-12-15 RX ORDER — HYDROCODONE BITARTRATE AND ACETAMINOPHEN 7.5; 325 MG/1; MG/1
1 TABLET ORAL EVERY 4 HOURS PRN
Status: DISCONTINUED | OUTPATIENT
Start: 2017-12-15 | End: 2017-12-16

## 2017-12-15 RX ORDER — DEXTROSE MONOHYDRATE 25 G/50ML
25 INJECTION, SOLUTION INTRAVENOUS
Status: DISCONTINUED | OUTPATIENT
Start: 2017-12-15 | End: 2017-12-19 | Stop reason: HOSPADM

## 2017-12-15 RX ORDER — MAGNESIUM SULFATE HEPTAHYDRATE 40 MG/ML
2 INJECTION, SOLUTION INTRAVENOUS AS NEEDED
Status: DISCONTINUED | OUTPATIENT
Start: 2017-12-15 | End: 2017-12-19 | Stop reason: HOSPADM

## 2017-12-15 RX ORDER — POTASSIUM CHLORIDE 1.5 G/1.77G
20 POWDER, FOR SOLUTION ORAL AS NEEDED
Status: DISCONTINUED | OUTPATIENT
Start: 2017-12-15 | End: 2017-12-19 | Stop reason: HOSPADM

## 2017-12-15 RX ORDER — PROMETHAZINE HYDROCHLORIDE 25 MG/1
12.5 TABLET ORAL EVERY 6 HOURS PRN
Status: DISCONTINUED | OUTPATIENT
Start: 2017-12-15 | End: 2017-12-15 | Stop reason: HOSPADM

## 2017-12-15 RX ORDER — OXYTOCIN 10 [USP'U]/ML
INJECTION, SOLUTION INTRAMUSCULAR; INTRAVENOUS AS NEEDED
Status: DISCONTINUED | OUTPATIENT
Start: 2017-12-15 | End: 2017-12-15 | Stop reason: SURG

## 2017-12-15 RX ORDER — FAMOTIDINE 20 MG/1
20 TABLET, FILM COATED ORAL EVERY 12 HOURS SCHEDULED
Status: CANCELLED | OUTPATIENT
Start: 2017-12-15

## 2017-12-15 RX ORDER — SODIUM CHLORIDE 450 MG/100ML
150 INJECTION, SOLUTION INTRAVENOUS CONTINUOUS
Status: ACTIVE | OUTPATIENT
Start: 2017-12-15 | End: 2017-12-15

## 2017-12-15 RX ORDER — POTASSIUM CHLORIDE 1.5 G/1.77G
40 POWDER, FOR SOLUTION ORAL AS NEEDED
Status: DISCONTINUED | OUTPATIENT
Start: 2017-12-15 | End: 2017-12-19 | Stop reason: HOSPADM

## 2017-12-15 RX ORDER — OXYCODONE HYDROCHLORIDE AND ACETAMINOPHEN 5; 325 MG/1; MG/1
1 TABLET ORAL ONCE AS NEEDED
Status: CANCELLED | OUTPATIENT
Start: 2017-12-15 | End: 2017-12-25

## 2017-12-15 RX ORDER — ZOLPIDEM TARTRATE 5 MG/1
5 TABLET ORAL NIGHTLY PRN
Status: DISCONTINUED | OUTPATIENT
Start: 2017-12-15 | End: 2017-12-15 | Stop reason: HOSPADM

## 2017-12-15 RX ADMIN — FENTANYL CITRATE 25 MCG: 50 INJECTION, SOLUTION INTRAMUSCULAR; INTRAVENOUS at 03:51

## 2017-12-15 RX ADMIN — MORPHINE SULFATE 4.7 MG: 0.5 INJECTION EPIDURAL; INTRATHECAL; INTRAVENOUS at 04:01

## 2017-12-15 RX ADMIN — SODIUM CHLORIDE, POTASSIUM CHLORIDE, SODIUM LACTATE AND CALCIUM CHLORIDE: 600; 310; 30; 20 INJECTION, SOLUTION INTRAVENOUS at 03:33

## 2017-12-15 RX ADMIN — SODIUM CHLORIDE, POTASSIUM CHLORIDE, SODIUM LACTATE AND CALCIUM CHLORIDE 125 ML/HR: 600; 310; 30; 20 INJECTION, SOLUTION INTRAVENOUS at 21:01

## 2017-12-15 RX ADMIN — HUMAN RHO(D) IMMUNE GLOBULIN 300 MCG: 1500 SOLUTION INTRAMUSCULAR; INTRAVENOUS at 16:05

## 2017-12-15 RX ADMIN — MORPHINE SULFATE 0.3 MG: 0.5 INJECTION EPIDURAL; INTRATHECAL; INTRAVENOUS at 03:51

## 2017-12-15 RX ADMIN — INSULIN LISPRO 3 UNITS: 100 INJECTION, SOLUTION INTRAVENOUS; SUBCUTANEOUS at 21:14

## 2017-12-15 RX ADMIN — MAGNESIUM SULFATE HEPTAHYDRATE 2 G: 40 INJECTION, SOLUTION INTRAVENOUS at 16:37

## 2017-12-15 RX ADMIN — INSULIN ASPART 24 UNITS: 100 INJECTION, SOLUTION INTRAVENOUS; SUBCUTANEOUS at 03:02

## 2017-12-15 RX ADMIN — DEXTROSE MONOHYDRATE 12.5 G: 25 INJECTION, SOLUTION INTRAVENOUS at 18:13

## 2017-12-15 RX ADMIN — PRENATAL VIT W/ FE FUMARATE-FA TAB 27-0.8 MG 1 TABLET: 27-0.8 TAB at 10:11

## 2017-12-15 RX ADMIN — SODIUM CHLORIDE 1000 ML: 9 INJECTION, SOLUTION INTRAVENOUS at 10:11

## 2017-12-15 RX ADMIN — SODIUM CHLORIDE, POTASSIUM CHLORIDE, SODIUM LACTATE AND CALCIUM CHLORIDE 1200 ML: 600; 310; 30; 20 INJECTION, SOLUTION INTRAVENOUS at 04:13

## 2017-12-15 RX ADMIN — SODIUM CHLORIDE 125 ML/HR: 9 INJECTION, SOLUTION INTRAVENOUS at 02:40

## 2017-12-15 RX ADMIN — MAGNESIUM SULFATE HEPTAHYDRATE 2 G: 40 INJECTION, SOLUTION INTRAVENOUS at 14:37

## 2017-12-15 RX ADMIN — SODIUM CHLORIDE 2 UNITS/HR: 9 INJECTION, SOLUTION INTRAVENOUS at 13:12

## 2017-12-15 RX ADMIN — HYDROCODONE BITARTRATE AND ACETAMINOPHEN 1 TABLET: 7.5; 325 TABLET ORAL at 21:20

## 2017-12-15 RX ADMIN — IBUPROFEN 600 MG: 600 TABLET, FILM COATED ORAL at 21:20

## 2017-12-15 RX ADMIN — FENTANYL CITRATE 75 MCG: 50 INJECTION, SOLUTION INTRAMUSCULAR; INTRAVENOUS at 04:01

## 2017-12-15 RX ADMIN — SIMETHICONE CHEW TAB 80 MG 80 MG: 80 TABLET ORAL at 21:01

## 2017-12-15 RX ADMIN — AMPICILLIN SODIUM 2 G: 2 INJECTION, POWDER, FOR SOLUTION INTRAMUSCULAR; INTRAVENOUS at 02:48

## 2017-12-15 RX ADMIN — POTASSIUM CHLORIDE, DEXTROSE MONOHYDRATE AND SODIUM CHLORIDE 150 ML/HR: 150; 5; 450 INJECTION, SOLUTION INTRAVENOUS at 11:35

## 2017-12-15 RX ADMIN — SODIUM CHLORIDE 2 UNITS/HR: 9 INJECTION, SOLUTION INTRAVENOUS at 03:31

## 2017-12-15 RX ADMIN — INSULIN LISPRO 5 UNITS: 100 INJECTION, SOLUTION INTRAVENOUS; SUBCUTANEOUS at 22:40

## 2017-12-15 RX ADMIN — SODIUM CHLORIDE 150 ML/HR: 4.5 INJECTION, SOLUTION INTRAVENOUS at 14:31

## 2017-12-15 RX ADMIN — DIPHENHYDRAMINE HYDROCHLORIDE 25 MG: 50 INJECTION INTRAMUSCULAR; INTRAVENOUS at 21:01

## 2017-12-15 RX ADMIN — DOCUSATE SODIUM 100 MG: 100 CAPSULE, LIQUID FILLED ORAL at 21:01

## 2017-12-15 RX ADMIN — POTASSIUM CHLORIDE 20 MEQ: 750 CAPSULE, EXTENDED RELEASE ORAL at 11:29

## 2017-12-15 RX ADMIN — Medication 2 G: at 02:48

## 2017-12-15 RX ADMIN — DIPHENHYDRAMINE HYDROCHLORIDE 25 MG: 50 INJECTION INTRAMUSCULAR; INTRAVENOUS at 14:55

## 2017-12-15 RX ADMIN — OXYTOCIN 20 UNITS: 10 INJECTION, SOLUTION INTRAMUSCULAR; INTRAVENOUS at 03:56

## 2017-12-15 RX ADMIN — ONDANSETRON 4 MG: 2 INJECTION INTRAMUSCULAR; INTRAVENOUS at 02:40

## 2017-12-15 RX ADMIN — MAGNESIUM SULFATE IN DEXTROSE 1 G: 10 INJECTION, SOLUTION INTRAVENOUS at 04:52

## 2017-12-15 RX ADMIN — ONDANSETRON 4 MG: 2 INJECTION, SOLUTION INTRAMUSCULAR; INTRAVENOUS at 02:40

## 2017-12-15 RX ADMIN — MAGNESIUM SULFATE HEPTAHYDRATE 2 G: 40 INJECTION, SOLUTION INTRAVENOUS at 18:38

## 2017-12-15 NOTE — NON STRESS TEST
Emily Bagley, a  at 36w0d with an GAVINO of 2018, by Ultrasound, was seen at [facility] for a nonstress test.    Chief Complaint   Patient presents with   • Vomiting     STARTING AT 3 AM TODAY   • Diarrhea       Interpretation A  Nonstress Test Interpretation A: Reactive (17 : Marilyn Fortune, RN)  Comments A: Verified By Jessy Blackburn RN  (17 : Marilyn Fortune, JASMIN)

## 2017-12-15 NOTE — PLAN OF CARE
Problem: Patient Care Overview (Adult)  Goal: Plan of Care Review  Outcome: Ongoing (interventions implemented as appropriate)    12/15/17 0938   Coping/Psychosocial Response Interventions   Plan Of Care Reviewed With patient;family   Patient Care Overview   Progress progress toward functional goals as expected       Goal: Adult Individualization and Mutuality  Outcome: Ongoing (interventions implemented as appropriate)    12/15/17 0938   Individualization   Patient Specific Goals maintain normal blood sugar   Patient Specific Interventions PP care, blood sugar monitoring       Goal: Discharge Needs Assessment  Outcome: Ongoing (interventions implemented as appropriate)    12/15/17 0938   Discharge Needs Assessment   Concerns To Be Addressed denies needs/concerns at this time         Problem: Postpartum ( Delivery) (Adult)  Intervention: Monitor/Manage Pain    12/15/17 0938   Manage Acute Burn Pain   Bowel Intervention ambulation promoted;adequate fluid intake promoted         12/15/17 0938   Manage Acute Burn Pain   Bowel Intervention ambulation promoted;adequate fluid intake promoted   Pain Management Interventions pain care plan reviewed with patient/caregiver       Intervention: Monitor/Manage Bleeding    12/15/17 0938   Cardiac Interventions    Bleed Management Rh status confirmed         Goal: Signs and Symptoms of Listed Potential Problems Will be Absent or Manageable (Postpartum)  Outcome: Ongoing (interventions implemented as appropriate)    12/15/17 0938   Postpartum ( Delivery)   Problems Assessed (Postpartum ) all   Problems Present (Postpartum ) none         Problem: Diabetes, Type 1 (Adult)  Intervention: Support/Optimize Psychosocial Response to Condition    12/15/17 0938   Coping Strategies   Family/Support System Care self-care encouraged;support provided   Coping/Psychosocial Interventions   Environmental Support calm environment promoted;rest periods  encouraged       Intervention: Monitor/Manage Signs of Ketoacidosis    12/15/17 0938   Nutrition Interventions   Fluid/Electrolyte Management fluids provided       Intervention: Optimize Glycemic Control    12/15/17 0938   Nutrition Interventions   Glycemic Management blood glucose monitoring         Goal: Signs and Symptoms of Listed Potential Problems Will be Absent or Manageable (Diabetes, Type 1)  Outcome: Ongoing (interventions implemented as appropriate)    12/15/17 0938   Diabetes, Type 1   Problems Assessed (Type 1 Diabetes) diabetic ketoacidosis (DKA)  (transferred from [place] for DKA)

## 2017-12-15 NOTE — PROGRESS NOTES
Laborist    Patient without C/O  Lochia light  Pain well controlled  VSS afebrile      UO    260cc  Past 4hr    Lungs: clear   abd  Soft   Incision intact   Lext   + scuds   No calf tenderness    BS   Increasing > 200  Plan: restart insulin drip per protocol , cont IV hydration, replace K+ and  magnesium

## 2017-12-15 NOTE — H&P
jAdmit H&P    Patient Name: Emily Bagley  : 1995  MRN: [id number]  Date of Service: 12/15/2017  Referring Provider: Milligan,Douglas A     ID: [id number] y.o.  at 36w1d    Chief complaint: DKA, type 1    Pregnancy course significant for:    Patient Active Problem List   Diagnosis   • Pre-existing type 1 diabetes mellitus during pregnancy in third trimester   • Polyhydramnios   • Chronic hypertension in pregnancy   • PIH (pregnancy induced hypertension)   • DKA (diabetic ketoacidoses)   • Diabetes education, encounter for   • Intrauterine fetal death in pregnancy, s/p induction, spontaneous vaginal delivery 10/9/16, Laborist Dr Juan   • Lactic acidosis   • Hypertension   • N&V (nausea and vomiting)   • Hypothyroid   • Endometritis   • IDDM (insulin dependent diabetes mellitus)   • Pregnancy   • Non-stress test reactive   • Diabetes mellitus complicating pregnancy, antepartum, third trimester   • DM (diabetes mellitus) in pregnancy   • Diabetes mellitus in pregnancy   • Nausea and vomiting in pregnancy   • DKA, type 1         REVIEW OF SYSTEMS  She denies headache, visual changes, or right upper quadrant pain.  All other systems were reviewed and were negative.    Prenatal Labs  Lab Results   Component Value Date    HGB 11.0 (L) 12/15/2017    RUBELLASCRN Immune 2017    HEPBSAG Negative 2017    ABO O 12/15/2017    RH Negative 12/15/2017    ABSCRN Positive 12/15/2017    HEPCVIRUSABY non reactive 2016    URINECX >100,000 CFU/mL Normal Urogenital Chaya 2017       OB HISTORY    OB History      Para Term  AB Living    3 1 0 1 1 1    SAB TAB Ectopic Multiple Live Births    1 0 0 0 0        PAST MEDICAL HISTORY    Past Medical History:   Diagnosis Date   • Anxiety     took meds briefly   • Diabetes mellitus     Type 1; Diagnosed via vomiting, family MD, sent to [place], sent to , serum glucose >900.   • Gestational hypertension    • Hypertension     With  pregnancy (2017-pt denies previously recorded dx of CHTN)   • Kidney failure 2016    R/T diabetes (8/10/2017- patient denies)    • Polyhydramnios      PAST SURGICAL HISTORY     has a past surgical history that includes Closed reduction finger dislocation (Left, 2004); Finger/Thumb Arthroplasty (Left); and Closed reduction finger dislocation.  CURRENT MEDICATIONS    Current Facility-Administered Medications on File Prior to Encounter   Medication Dose Route Frequency Provider Last Rate Last Dose   • [COMPLETED] ampicillin 2 g/100 mL 0.9% NS (MBP)  2 g Intravenous Once Jose Noel III,  mL/hr at 12/15/17 0248 2 g at 12/15/17 0248   • [COMPLETED] lactated ringers bolus 1,000 mL  1,000 mL Intravenous Once PRN Jose Noel III, MD   1,200 mL at 12/15/17 0413   • [DISCONTINUED] acetaminophen (TYLENOL) tablet 650 mg  650 mg Oral Q4H PRN Jose Noel III, MD       • [DISCONTINUED] ampicillin 1 g/100 mL 0.9% NS (MBP)  1 g Intravenous Q4H Jose Noel III, MD       • [DISCONTINUED] butorphanol (STADOL) injection 1 mg  1 mg Intravenous Q2H PRN Jose Noel III, MD       • [DISCONTINUED] butorphanol (STADOL) injection 2 mg  2 mg Intravenous Q2H PRN Jose Noel III, MD       • [DISCONTINUED] dextrose (D50W) solution 12.5 g  12.5 g Intravenous Q15 Min PRN Beck Archer MD       • [DISCONTINUED] dextrose (D50W) solution 25 g  25 g Intravenous Q15 Min PRN Jose Noel III, MD       • [DISCONTINUED] dextrose (GLUTOSE) oral gel 15 g  15 g Oral Q15 Min PRN Jose Noel III, MD       • [DISCONTINUED] dextrose 5 % and sodium chloride 0.45 % infusion  150 mL/hr Intravenous Continuous PRN Beck Archer MD       • [DISCONTINUED] dextrose 5 % and sodium chloride 0.45 % with KCl 20 mEq/L infusion  150 mL/hr Intravenous Continuous PRN Beck Archer MD       • [DISCONTINUED] diphenhydrAMINE (BENADRYL) capsule 25 mg  25 mg Oral Nightly PRN Jose Noel III, MD       • [DISCONTINUED] diphenhydrAMINE  (BENADRYL) injection 25 mg  25 mg Intravenous Nightly PRN Jose Noel III, MD       • [DISCONTINUED] fentaNYL citrate (PF) (SUBLIMAZE) injection   Intrathecal PRN Tae Guerrier MD   75 mcg at 12/15/17 0401   • [DISCONTINUED] glucagon (human recombinant) (GLUCAGEN DIAGNOSTIC) injection 1 mg  1 mg Subcutaneous Q15 Min PRN Jose Noel III, MD       • [DISCONTINUED] insulin aspart (novoLOG) injection 0-24 Units  0-24 Units Subcutaneous 4x Daily AC & at Bedtime Jose Noel III, MD   24 Units at 12/15/17 0302   • [DISCONTINUED] insulin aspart (novoLOG) injection 1 Units  1 Units Subcutaneous TID With Meals Jose Noel III, MD       • [DISCONTINUED] insulin regular (humuLIN R,novoLIN R) 100 Units in sodium chloride 0.9 % 100 mL (1 Units/mL) infusion  0.1 Units/kg/hr Intravenous Titrated Beck Archer MD 5 mL/hr at 12/15/17 0453 5 Units/hr at 12/15/17 0453   • [DISCONTINUED] insulin regular (humuLIN R,novoLIN R) injection 9.6 Units  0.1 Units/kg Intravenous Once Beck Archer MD       • [DISCONTINUED] insulin regular (humuLIN R,novoLIN R) injection 9.6 Units  0.1 Units/kg Intravenous Once PRN Beck Archer MD       • [DISCONTINUED] lactated ringers infusion  125 mL/hr Intravenous Continuous Jose Noel III, MD       • [DISCONTINUED] lidocaine (XYLOCAINE) 1 % injection 5 mL  5 mL Intradermal PRN Jose Noel III, MD       • [DISCONTINUED] lidocaine (XYLOCAINE) 1 % injection 5 mL  5 mL Intradermal PRN Jose Noel III, MD       • [DISCONTINUED] Magnesium Sulfate 2 gram infusion- Mg 1.6 - 1.9 mg/dL  2 g Intravenous PRN Beck Archer MD       • [DISCONTINUED] magnesium sulfate 3 gram infusion (1gm x 3) - Mg 1.1 - 1.5 mg/dL  1 g Intravenous PRN Beck Archer MD       • [DISCONTINUED] magnesium sulfate 3 gram infusion (1gm x 3) - Mg 1.1 - 1.5 mg/dL  1 g Intravenous Q1H Beck Archer  mL/hr at 12/15/17 0452 1 g at 12/15/17 0452   • [DISCONTINUED] magnesium sulfate  4 gram infusion - Mg less than or equal to 1mg/dL  4 g Intravenous PRN Beck Archer MD       • [DISCONTINUED] methylergonovine (METHERGINE) 0.2 MG/ML injection  - ADS Override Pull            • [DISCONTINUED] misoprostol (CYTOTEC) tablet 600 mcg  600 mcg Oral Once Jose Noel III, MD       • [DISCONTINUED] morphine PF (DURAMORPH) injection    PRN Tae Guerrier MD   4.7 mg at 12/15/17 0401   • [DISCONTINUED] ondansetron (ZOFRAN) injection 4 mg  4 mg Intravenous Q6H PRN Jose Noel III, MD   4 mg at 12/15/17 0240   • [DISCONTINUED] ondansetron (ZOFRAN) tablet 4 mg  4 mg Oral Q6H PRN Jose Noel III, MD       • [DISCONTINUED] ondansetron ODT (ZOFRAN-ODT) disintegrating tablet 4 mg  4 mg Oral Q6H PRN Jose Noel III, MD       • [DISCONTINUED] oxytocin (PITOCIN) 20 units / 1000 ml in lactated Ringer's 1000 mL infusion solution  - ADS Override Pull            • [DISCONTINUED] oxytocin (PITOCIN) injection   Intravenous PRN Tae Guerrier MD   20 Units at 12/15/17 0356   • [DISCONTINUED] promethazine (PHENERGAN) 12.5 mg in sodium chloride 0.9 % 50 mL  12.5 mg Intravenous Q6H PRN Jose Noel III, MD       • [DISCONTINUED] promethazine (PHENERGAN) injection 12.5 mg  12.5 mg Intramuscular Q6H PRN Jose Noel III, MD       • [DISCONTINUED] promethazine (PHENERGAN) suppository 12.5 mg  12.5 mg Rectal Q6H PRN Jose Noel III, MD       • [DISCONTINUED] promethazine (PHENERGAN) tablet 12.5 mg  12.5 mg Oral Q6H PRN Jose Noel III, MD       • [DISCONTINUED] sodium chloride (NS) irrigation solution 1,000 mL  1,000 mL Irrigation Once PRN Jose Noel III, MD       • [DISCONTINUED] sodium chloride 0.45 % infusion  125 mL/hr Intravenous Continuous Jose Noel III, MD       • [DISCONTINUED] sodium chloride 0.45 % infusion  250 mL/hr Intravenous Continuous Beck Archer MD       • [DISCONTINUED] sodium chloride 0.45 % with KCl 20 mEq/L infusion  250 mL/hr Intravenous Continuous PRN Beck Long  MD Suzi       • [DISCONTINUED] sodium chloride 0.9 % bolus 1,000 mL  1,000 mL Intravenous Once Beck Ethan Archer MD       • [DISCONTINUED] sodium chloride 0.9 % flush 1-10 mL  1-10 mL Intravenous PRN Jose Noel III, MD       • [DISCONTINUED] sodium chloride 0.9 % flush 1-10 mL  1-10 mL Intravenous PRN Jose Noel III, MD       • [DISCONTINUED] sodium chloride 0.9 % infusion  - ADS Override Pull            • [DISCONTINUED] sodium chloride 0.9 % infusion  125 mL/hr Intravenous Continuous Jose Noel III, MD       • [DISCONTINUED] sodium chloride 0.9 % infusion  125 mL/hr Intravenous Continuous Jose Noel III,  mL/hr at 12/15/17 0240 125 mL/hr at 12/15/17 0240   • [DISCONTINUED] terbutaline (BRETHINE) injection 0.25 mg  0.25 mg Subcutaneous PRN Jose Noel III, MD       • [DISCONTINUED] zolpidem (AMBIEN) tablet 5 mg  5 mg Oral Nightly PRN Jose Noel III, MD         Current Outpatient Prescriptions on File Prior to Encounter   Medication Sig Dispense Refill   • aspirin 81 MG EC tablet Take 81 mg by mouth Daily.     • insulin detemir (LEVEMIR) 100 UNIT/ML injection Inject 60 Units under the skin Daily. 30 mL 5   • Insulin Lispro (HUMALOG KWIKPEN) 100 UNIT/ML solution pen-injector 30 units TID 30 mL 5   • Loratadine (CLARITIN) 10 MG capsule Take 1 tablet/day by mouth Daily.     • Prenatal Vit-Fe Fumarate-FA (PRENATAL VITAMIN 27-0.8) 27-0.8 MG tablet tablet Take 1 tablet by mouth Daily.       ALLERGIES    Review of patient's allergies indicates no known allergies.  SOCIAL HISTORY    History   Smoking Status   • Never Smoker   Smokeless Tobacco   • Never Used     History   Alcohol Use No     History   Drug Use No     PHYSICAL EXAMINATION    Vital Signs Range for the last 24 hours  Temperature: Temp:  [97.4 °F (36.3 °C)-99.7 °F (37.6 °C)] 99.7 °F (37.6 °C)   Temp Source: Temp src: Oral   BP: BP: (115-168)/() 117/62   Pulse: Heart Rate:  [] 110   Respirations: Resp:  [16-20] 16    Weight: 96.2 kg (212 lb)     Constitutional:  No acute distress  HEENT: Grossly normal.  Respiratory:  Lungs are clear to auscultation bilaterally, normal breath sounds.   Cardiovascular:  Normal rate and rhythm, no murmurs.   Abdomen:  Soft, gravid, nontender.  Neurologic:  Alert & oriented x 4,  no focal deficits noted.   Psychiatric:  Speech and behavior appropriate.   Extremities: no cyanosis, no evidence of DVT.        Labs:    Results from last 7 days  Lab Units 12/15/17  0302   ABO TYPING  O   RH TYPING  Negative   ANTIBODY SCREEN  Positive     ASSESSMENT/PLAN:  22 y.o. female  at 36w1d with DKA, type 1.  Was delivered by  early this morning. Developed nausea and vomiting yesterday. Was seen by her OB who sent her to the hospital. DKA was diagnosed and she was delivered for nonreassuring fetal status.  Transferred to [facility] to manage DKA. Her blood glucose was below 100 and her insulin drip was halted.  Will continue DKA protocol for now.  Post op management.       Approximately 15 minutes minutes floor time was spent in care of this patient, of which greater than 50% was spent in face-to-face consultation and coordination of care.    Ben Whitaker MD  12/15/2017  8:32 AM

## 2017-12-15 NOTE — NON STRESS TEST
Emily Bagley, a  at 36w1d with an GAVINO of 2018, by Ultrasound, was seen at [facility] for a nonstress test.    Chief Complaint   Patient presents with   • Non-stress Test     DECREASED FETAL MOVEMENT       Interpretation A  Nonstress Test Interpretation A: Reactive (12/15/17 0153 : Jessy Blackburn RN)     ANDERSON WILLIAMSON RN

## 2017-12-15 NOTE — CONSULTS
Hospitalist Consult Note        Patient Identification  Name: Emily Bagley  Age/Sex: 22 y.o. female  :  1995        MRN: [id number]  Visit Number: [id number]  PCP: PANFILO Herrera    Inpatient Consult to Hospitalist  Consult performed by: CHARLOTTE JOLLY  Consult ordered by: PORTIA BURNETTE III          Referring Provider: Dr Burnette   Reason for Consultation: Hyperglycemia      History of present illness:  Patient is a 22 year old female with Type I diabetes. She presented to OB early this morning at 36 weeks gestation with complaints of intractable nausea and vomiting and worsening abdominal pain and shortness of breath. She reports she started vomiting around 3 am on . She did not check her blood glucose level because she could not keep any food down so she didn't think she needed to check her levels or give herself insulin. She presented to the OB floor earlier this evening due to persistent vomiting as well as development of abdominal pain; furthermore, she could not feel her baby moving anymore. Fetal monitoring was reported to be unremarkable at that time and blood work revealed a glucose level in the 80s. She was told everything seemed to be ok and she was subsequently discharged home. She continued to experience intractable vomiting on the way home, however, and her abdominal pain worsened. She also developed worsening shortness of breath. Thus, her family brought her back to the hospital. Blood glucose checked shortly after arrival had risen to 332. The hospitalist service was consulted at this time, around 230 am. Upon review of her chart, I noticed that her lab work from earlier this evening showed low bicarb of 14 and elevated anion gap of 17. Urinalysis showed 3+ (>1000) glucose and 4+ (>160 ketones). I ordered STAT ABG which showed significant metabolic acidosis, with pH 7.192, PCO2 9.4, and bicarb of only 3.5, confirming diagnosis of diabetic ketoacidosis. Repeat  BMP was consistent with these results, showing , <10 bicarb and thus anion gap could not even be calculated. By the time labs were resulting, the patient's baby was now showing signs of distress on fetal monitoring, thus Dr Noel was already arranging for the patient to be taken to the OR for emergent cesarian delivery.     Review of Systems  Review of Systems   Constitutional: Positive for activity change, appetite change and fatigue. Negative for chills, diaphoresis and fever.   HENT: Negative for congestion, nosebleeds, postnasal drip, rhinorrhea and sinus pressure.    Eyes: Negative for photophobia, pain, discharge, redness, itching and visual disturbance.   Respiratory: Positive for shortness of breath. Negative for cough, choking and wheezing.    Cardiovascular: Positive for leg swelling. Negative for chest pain and palpitations.   Gastrointestinal: Positive for abdominal pain, nausea and vomiting. Negative for abdominal distention, blood in stool, constipation and diarrhea.   Endocrine: Positive for polydipsia. Negative for cold intolerance, heat intolerance, polyphagia and polyuria.   Genitourinary: Negative for difficulty urinating, dysuria, hematuria and pelvic pain.   Musculoskeletal: Negative for arthralgias, back pain, joint swelling, myalgias, neck pain and neck stiffness.   Skin: Negative for color change, pallor, rash and wound.   Allergic/Immunologic: Negative for environmental allergies, food allergies and immunocompromised state.   Neurological: Negative for dizziness, tremors, seizures, syncope, weakness, light-headedness, numbness and headaches.   Hematological: Negative for adenopathy. Does not bruise/bleed easily.   Psychiatric/Behavioral: Negative for agitation, behavioral problems and confusion.       History  Past Medical History:   Diagnosis Date   • Anxiety 2014    took meds briefly   • Diabetes mellitus 2000    Type 1; Diagnosed via vomiting, family MD, sent to [place], sent to  [place], serum glucose >900.   • Gestational hypertension 2016   • Hypertension 2016    With pregnancy (2017-pt denies previously recorded dx of TN)   • Kidney failure 2016    R/T diabetes (8/10/2017- patient denies)    • Polyhydramnios       Past Surgical History:   Procedure Laterality Date   • FINGER/THUMB ARTHROPLASTY Left     age 9 yr   • FINGER/THUMB CLOSED REDUCTION Left 2004   • FINGER/THUMB CLOSED REDUCTION     ,  Family History   Problem Relation Age of Onset   • Hypertension Father    • Cancer Maternal Grandfather    • Cancer Paternal Grandmother    • Arthritis Paternal Grandfather    • Heart attack Paternal Grandfather    • Hypertension Paternal Grandfather       Social History   Substance Use Topics   • Smoking status: Never Smoker   • Smokeless tobacco: Never Used   • Alcohol use No      ALLERGIES: Review of patient's allergies indicates no known allergies.    Objective     Vital Signs   Temp:  [97.4 °F (36.3 °C)-97.9 °F (36.6 °C)] 97.9 °F (36.6 °C)  Heart Rate:  [] 125  Resp:  [18-20] 18  BP: (115-168)/() 145/81  Last 2 weights    12/15/17  0130   Weight: 96.2 kg (212 lb)     Body mass index is 34.22 kg/(m^2).    Physical Exam:  Physical Exam   Constitutional: She is oriented to person, place, and time.   Patient is now s/p cesarian delivery. Her breathing has slowed and is less labored. She is in no acute distress.   HENT:   Head: Normocephalic and atraumatic.   Oropharnyx clear. Oral mucosa and lips are very dry.   Eyes: Conjunctivae and EOM are normal. Pupils are equal, round, and reactive to light.   Neck: Normal range of motion. Neck supple. No JVD present.   Cardiovascular: Normal heart sounds and intact distal pulses.  Exam reveals no friction rub.    No murmur heard.  Tachycardic, regular rhythm   Pulmonary/Chest:   Mildly tachypneic with shallow breathing, but less labored compared to before C/S   Abdominal: Soft. Bowel sounds are normal. She exhibits no distension.   Palpation  deferred as she just returned from OR for C/S. Surgical incision is dry and clean at this time.   Musculoskeletal: Normal range of motion. She exhibits edema (1+ pitting edema b/l lower ext). She exhibits no tenderness.   Lymphadenopathy:     She has no cervical adenopathy.   Neurological: She is alert and oriented to person, place, and time.   No focal deficits appreciated on exam   Skin: Skin is warm and dry. No rash noted. No erythema. No pallor.   Psychiatric: She has a normal mood and affect. Her behavior is normal. Judgment and thought content normal.       Results Review:      Results from last 7 days  Lab Units 12/15/17  0302 12/14/17  1900   WBC 10*3/mm3 15.70* 12.03   HEMOGLOBIN g/dL 11.0* 12.0   PLATELETS 10*3/mm3 230 205           Results from last 7 days  Lab Units 12/15/17  0302 12/14/17  1900   SODIUM mmol/L 136 137   POTASSIUM mmol/L 4.6 4.0   CHLORIDE mmol/L 104 106   CO2 mmol/L <10.0* 14.1*   BUN mg/dL 16 13   CREATININE mg/dL 1.05 0.78   CALCIUM mg/dL 9.3 9.9   GLUCOSE mg/dL 369* 87       Results from last 7 days  Lab Units 12/15/17  0423   MAGNESIUM mg/dL 1.4*     No results found for: HGBA1C    Results from last 7 days  Lab Units 12/15/17  0302 12/14/17  1900   BILIRUBIN mg/dL 0.4 0.5   ALK PHOS U/L 120* 123*   AST (SGOT) U/L 20 16   ALT (SGPT) U/L 15 11                   Results from last 7 days  Lab Units 12/15/17  0444   PH, ARTERIAL pH units 7.160*   PO2 ART mm Hg 136.0*   PCO2, ARTERIAL mm Hg 9.1*   HCO3 ART mmol/L 3.2*        I have reviewed the patient's laboratory results.     Imaging Results (last 72 hours)     ** No results found for the last 72 hours. **          I have personally reviewed the patient's radiologic imaging.      Assessment/Plan     Active Problems:  - Diabetic ketoacidosis: collaborated with both Dr Noel, OB, and Dr Guerrier, anesthesiology, to initiate IV insulin drip per DKA protocol while patient was in the OR for emergent cesarian section. CCU nurses assisted with  setup and monitored blood glucose levels intra-operatively. Patient is now s/p cesarian section and resting in recovery. Her kussmal breathing persists but is significantly improved. Blood glucose levels are steadily trending down. Will monitor BMP q4h. Orders are in to adjust IV maintenance fluids as glucose levels begin to stabilize. Greatly appreciate Dr Noel and Dr Guerrier in the management of this patient. Also appreciate the hard work of nurses and other ancillary staff, both in L&D and those who came over from the CCU. Due to lack of higher acuity level beds, Dr Noel has elected to transfer the patient to [facility] for further management. While we are waiting to transportation to be arranged, I will continue to be available as needed.    Thank you for the consultation.         I discussed the patients findings and my recommendations with the patient, her family, Dr Noel, Dr Guerrier and L&D and CCU nurses.    * Patient is high risk due to diabetic ketoacidosis requiring emergent cesarian section due to fetal distress    Beck Archer MD  12/15/17  5:58 AM

## 2017-12-15 NOTE — ANESTHESIA PREPROCEDURE EVALUATION
Anesthesia Evaluation     Patient summary reviewed and Nursing notes reviewed   no history of anesthetic complications:  NPO Solid Status: > 8 hours  NPO Liquid Status: > 8 hours     Airway   Mallampati: II  TM distance: >3 FB  Neck ROM: full  no difficulty expected  Dental - normal exam     Pulmonary - negative pulmonary ROS and normal exam   (-) asthma, not a smoker  Cardiovascular - normal exam  Exercise tolerance: good (4-7 METS)    NYHA Classification: II    (+) hypertension, dysrhythmias Tachycardia,   (-) past MI, angina      Neuro/Psych- negative ROS  (-) seizures  GI/Hepatic/Renal/Endo    (+)  GERD, diabetes mellitus type 1 poorly controlled using insulin, hypothyroidism,     Musculoskeletal (-) negative ROS    Abdominal  - normal exam    Bowel sounds: normal.   Substance History - negative use     OB/GYN    (+) Pregnant, pregnancy induced hypertension        Other - negative ROS                                     Anesthesia Plan    ASA 3 - emergent     spinal     Anesthetic plan and risks discussed with patient.  Use of blood products discussed with patient  Consented to blood products.

## 2017-12-15 NOTE — H&P
Chief complaint   Chief Complaint   Patient presents with   • Non-stress Test     DECREASED FETAL MOVEMENT       History of Present Illness: Patient is a 22 y.o. female who presents with IUP at 36w1d weeks gestation. G 3, P 0110. Patient presents with decreased fetal movement, DKA, History of IUFD, non reassuring fetal heart tones.       Past Medical History:   Diagnosis Date   • Anxiety 2014    took meds briefly   • Diabetes mellitus 2000    Type 1; Diagnosed via vomiting, family MD, sent to [place], sent to , serum glucose >900.   • Gestational hypertension 2016   • Hypertension 2016    With pregnancy (2017-pt denies previously recorded dx of TN)   • Kidney failure 2016    R/T diabetes (8/10/2017- patient denies)    • Polyhydramnios    IUFD  DKA    Blood Type: O   Fetal Gender: Female    Past Surgical History:   Procedure Laterality Date   • FINGER/THUMB ARTHROPLASTY Left     age 9 yr   • FINGER/THUMB CLOSED REDUCTION Left 2004   • FINGER/THUMB CLOSED REDUCTION       Family History   Problem Relation Age of Onset   • Hypertension Father    • Cancer Maternal Grandfather    • Cancer Paternal Grandmother    • Arthritis Paternal Grandfather    • Heart attack Paternal Grandfather    • Hypertension Paternal Grandfather      Social History   Substance Use Topics   • Smoking status: Never Smoker   • Smokeless tobacco: Never Used   • Alcohol use No     No prescriptions prior to admission.     Allergies:  Review of patient's allergies indicates no known allergies.      Vital Signs  Temp:  [97.4 °F (36.3 °C)-98.2 °F (36.8 °C)] 98.2 °F (36.8 °C)  Heart Rate:  [] 100  Resp:  [18-20] 18  BP: (115-168)/() 136/82    Radiology  Imaging Results (last 24 hours)     ** No results found for the last 24 hours. **          Labs  Lab Results (last 24 hours)     Procedure Component Value Units Date/Time    POC Glucose Once [[id number]]  (Abnormal) Collected:  12/15/17 0212    Specimen:  Blood Updated:  12/15/17 0220      Glucose 332 (H) mg/dL     Narrative:       Meter: [id number] : [id number] Alexey Jessy ALEXIA    Urine Culture - Urine, Urine, Clean Catch [[id number]] Collected:  12/15/17 0232    Specimen:  Urine from Urine, Catheter Updated:  12/15/17 0251    Urinalysis With / Culture If Indicated - Urine, Clean Catch [[id number]]  (Abnormal) Collected:  12/15/17 0232    Specimen:  Urine from Urine, Catheter Updated:  12/15/17 0251     Color, UA Yellow     Appearance, UA Cloudy (A)     pH, UA <=5.0     Specific Gravity, UA 1.023     Glucose,  mg/dL (2+) (A)     Ketones, UA >=160 mg/dL (4+) (A)     Bilirubin, UA Negative     Blood, UA Negative     Protein,  mg/dL (2+) (A)     Leuk Esterase, UA Negative     Nitrite, UA Negative     Urobilinogen, UA 0.2 E.U./dL    Urinalysis, Microscopic Only - Urine, Clean Catch [[id number]]  (Abnormal) Collected:  12/15/17 0232    Specimen:  Urine from Urine, Catheter Updated:  12/15/17 0251     RBC, UA 3-6 (A) /HPF      WBC, UA 3-6 (A) /HPF      Bacteria, UA 1+ (A) /HPF      Squamous Epithelial Cells, UA None Seen /HPF      Hyaline Casts, UA 3-6 /LPF      Methodology Automated Microscopy    Blood Gas, Arterial [[id number]]  (Abnormal) Collected:  12/15/17 0249    Specimen:  Arterial Blood Updated:  12/15/17 0259     Site Arterial: right brachial     Srinivas's Test N/A     pH, Arterial 7.192 (C) pH units      pCO2, Arterial 9.4 (C) mm Hg      pO2, Arterial 126.9 (H) mm Hg      HCO3, Arterial 3.5 (C) mmol/L      Base Excess, Arterial -22.0 mmol/L      O2 Saturation, Arterial 97.8 %      Hemoglobin, Blood Gas 12.0 g/dL      Hematocrit, Blood Gas 35.0 (L) %      Oxyhemoglobin 96.9 %      Methemoglobin 0.30 %      Carboxyhemoglobin 0.6 %      A-a Gradiant 3.5 mmHg      Temperature 98.5 C      Barometric Pressure for Blood Gas 718 mmHg      Modality Room Air     FIO2 21 %     CBC (No Diff) [[id number]]  (Abnormal) Collected:  12/15/17 0302    Specimen:  Blood Updated:  12/15/17 0311     WBC  15.70 (H) 10*3/mm3      RBC 4.20 10*6/mm3      Hemoglobin 11.0 (L) g/dL      Hematocrit 36.2 (L) %      MCV 86.2 fL      MCH 26.2 (L) pg      MCHC 30.4 (L) g/dL      RDW 15.3 (H) %      RDW-SD 47.3 fl      MPV 13.6 (H) fL      Platelets 230 10*3/mm3     POC Glucose Once [[id number]]  (Abnormal) Collected:  12/15/17 0331    Specimen:  Blood Updated:  12/15/17 0338     Glucose 329 (H) mg/dL     Narrative:       Meter: [id number] : [id number] Devika DURHAM    Comprehensive Metabolic Panel [[id number]]  (Abnormal) Collected:  12/15/17 0302    Specimen:  Blood Updated:  12/15/17 0339     Glucose 369 (H) mg/dL      BUN 16 mg/dL      Creatinine 1.05 mg/dL      Sodium 136 mmol/L      Potassium 4.6 mmol/L      Chloride 104 mmol/L      CO2 <10.0 (L) mmol/L      Calcium 9.3 mg/dL      Total Protein 6.6 g/dL      Albumin 3.60 g/dL      ALT (SGPT) 15 U/L      AST (SGOT) 20 U/L      Alkaline Phosphatase 120 (H) U/L       Note New Reference Ranges        Total Bilirubin 0.4 mg/dL      eGFR Non African Amer 67 mL/min/1.73      Globulin 3.0 gm/dL      A/G Ratio 1.2 (L) g/dL      BUN/Creatinine Ratio 15.2     Anion Gap -- mmol/L       Unable to calculate Anion Gap.       Osmolality, Calculated [[id number]]  (Normal) Collected:  12/15/17 0302    Specimen:  Blood Updated:  12/15/17 0339     Osmolality Calc 288.2 mOsm/kg     Acetone [[id number]]  (Abnormal) Collected:  12/15/17 0302    Specimen:  Blood Updated:  12/15/17 0353     Acetone Moderate (A)    POC Glucose Once [[id number]]  (Abnormal) Collected:  12/15/17 0356    Specimen:  Blood Updated:  12/15/17 0417     Glucose 331 (H) mg/dL     Narrative:       Meter: [id number] : [id number] Lazaro Das    POC Glucose Once [[id number]]  (Abnormal) Collected:  12/15/17 0410    Specimen:  Blood Updated:  12/15/17 0417     Glucose 303 (H) mg/dL     Narrative:       Meter: [id number] : [id number] Lazaro Das    POC Glucose Once [[id number]]  (Abnormal) Collected:  12/15/17 0420     Specimen:  Blood Updated:  12/15/17 0433     Glucose 277 (H) mg/dL     Narrative:       Meter: [id number] : [id number] Shawn Landrum    Phosphorus [[id number]]  (Abnormal) Collected:  12/15/17 0423    Specimen:  Blood Updated:  12/15/17 0443     Phosphorus 5.1 (H) mg/dL     Magnesium [[id number]]  (Abnormal) Collected:  12/15/17 0423    Specimen:  Blood Updated:  12/15/17 0444     Magnesium 1.4 (C) mg/dL     Blood Gas, Arterial [[id number]]  (Abnormal) Collected:  12/15/17 0444    Specimen:  Arterial Blood Updated:  12/15/17 0449     Site Arterial: right radial     Srinivas's Test Positive     pH, Arterial 7.160 (C) pH units      pCO2, Arterial 9.1 (C) mm Hg      pO2, Arterial 136.0 (H) mm Hg      HCO3, Arterial 3.2 (C) mmol/L      Base Excess, Arterial -23.0 mmol/L      O2 Saturation, Arterial 98.0 %      Hemoglobin, Blood Gas 11.6 (L) g/dL      Hematocrit, Blood Gas 34.0 (L) %      Oxyhemoglobin 97.3 %      Methemoglobin 0.40 %      Carboxyhemoglobin 0.3 %      Temperature 98.6 C      Barometric Pressure for Blood Gas 718 mmHg      Modality Room Air     FIO2 21 %     POC Glucose Once [[id number]]  (Abnormal) Collected:  12/15/17 0453    Specimen:  Blood Updated:  12/15/17 0459     Glucose 223 (H) mg/dL     Narrative:       Meter: [id number] : [id number] Shawn Landrum    POC Glucose Once [[id number]]  (Normal) Collected:  12/15/17 0620    Specimen:  Blood Updated:  12/15/17 0645     Glucose 85 mg/dL     Narrative:       Meter: [id number] : [id number] Devika DURHAM            Review of Systems    The following systems were reviewed and negative;  ENT, respiratory, cardiovascular, gastrointestinal, genitourinary, breast, endocrine and allergies / immunologic.      Physical Exam:      General Appearance:    Alert, cooperative, in no acute distress   Head:    Normocephalic, without obvious abnormality, atraumatic   Eyes:            Lids and lashes normal, conjunctivae and sclerae normal, no   icterus,  no pallor, corneas clear, PERRLA   Ears:    Ears appear intact with no abnormalities noted   Throat:   No oral lesions, no thrush, oral mucosa moist   Neck:   No adenopathy, supple, trachea midline, no thyromegaly, no     carotid bruit, no JVD   Back:     No kyphosis present, no scoliosis present, no skin lesions,       erythema or scars, no tenderness to percussion or                   palpation,   range of motion normal   Lungs:     Clear to auscultation,respirations regular, even and                   unlabored    Heart:    Regular rhythm and normal rate, normal S1 and S2, no            murmur, no gallop, no rub, no click   Breast Exam:    Deferred   Abdomen:     Normal bowel sounds, no masses, no organomegaly, soft        non-tender, non-distended, no guarding, no rebound                 tenderness   Genitalia:    Deferred   Extremities:   Moves all extremities well, no edema, no cyanosis, no              redness   Pulses:   Pulses palpable and equal bilaterally   Skin:   No bleeding, bruising or rash   Lymph nodes:   No palpable adenopathy   Neurologic:   Cranial nerves 2 - 12 grossly intact, sensation intact, DTR        present and equal bilaterally         Assessment: Patient is a 22 y.o. female who presents with IUP at 36w1d weeks gestation. G 3, P 0110. Non Reassuring Fetal Heart Tones, Decreased fetal movement.     Chief Complaint   Patient presents with   • Non-stress Test     DECREASED FETAL MOVEMENT       Plan of Care: Admit. Proceed with a primary low transverse  section.       Jose Noel III, MD  12/15/17  7:55 AM

## 2017-12-15 NOTE — CONSULTS
"Diabetes Education  Assessment/Teaching    Patient Name:  Emily Bagley  YOB: 1995  MRN: [id number]  Admit Date:  12/15/2017      Assessment Date:  12/15/2017       Most Recent Value    General Information      Referral From:  A1c, Blood glucose, MD order    Height  170.2 cm (67\")    Height Method  Stated    Weight  96.2 kg (212 lb)    Weight Method  Stated    Pregnancy Assessment     Diabetes History     What type of diabetes do you have?  Type 1    Length of Diabetes Diagnosis  10 + years    Current DM knowledge  fair    Have you had diabetes education/teaching in the past?  no    Do you test your blood sugar at home?  yes    Education Preferences     Nutrition Information     Assessment Topics     Healthy Eating - Assessment  -- [stated she does CHO counting and doses 1 unit for 5 gm CHO. But not using any reference to look up foods]    Being Active - Assessment  Needs education    Taking Medication - Assessment  Needs education [stated she never missed a dose]    Problem Solving - Assessment  Needs education    Reducing Risk - Assessment  Needs education    Healthy Coping - Assessment  Needs education    Monitoring - Assessment  Needs education    DM Goals                Most Recent Value    DM Education Needs     Meter  Has own    Frequency of Testing  4 times a day    Blood Glucose Target  -- [she said a goal before meals is <150. We discussed the ADA glucose goals for pregnancy and for non-pregnant adults]    Blood Glucose Target Range  -- [provided ADA handout with suggested glucose goals.]    Medication  Insulin, Pen, Actions    Problem Solving  Hypoglycemia, Hyperglycemia, Sick days, Signs, Symptoms, Treatment, Other (comment) [discussed ketones]    Reducing Risks  A1C testing    Physical Activity Frequency  Discussed exercise importance    Healthy Coping  Denial [we discussed problem solving and making logical decisions, not emotional ones]    Discharge Plan  Home    Motivation  Moderate "    Teaching Method  Explanation, Discussion, Handouts, Teach back    Patient Response  Verbalized understanding            Other Comments:  Discussed preconception planning for future pregnancies, her non pregnant diabetes management for her type 1, problem solving, ketone testing, and coping with diabetes. She did not know correct ketone testing guidelines, so we reviewed.  She did not know ADA recommended glucose goals for pregnancy and non-pregnancy, so we discussed. We discussed decision making logically versus emotionally.  Discussed prevention of complications to insure a long healthy life to raise her daughter.  She stated she has been seeing Dr. Kerr for her glucose management and I urged her to continue to see her for her diabetes care.  I urged her to call the [facility] diabetes team prn.      Electronically signed by:  Marjorie Dhaliwal RN  12/15/17 1:14 PM

## 2017-12-15 NOTE — ANESTHESIA POSTPROCEDURE EVALUATION
Patient: Emily Bagley    Procedure Summary     Date Anesthesia Start Anesthesia Stop Room / Location    12/15/17 0333 0420  [facility]       Procedure Diagnosis Surgeon Provider     SECTION PRIMARY (Bilateral Abdomen) (DKA) MD Tae Beaulieu III, MD          Anesthesia Type: spinal  Last vitals  BP   115/88 (12/15/17 0425)   Temp   97.9 °F (36.6 °C) (12/15/17 0130)   Pulse   118 (12/15/17 0425)   Resp   18 (12/15/17 0425)     SpO2   100 % (12/15/17 0420)     Post Anesthesia Care and Evaluation    Patient location during evaluation: PACU  Patient participation: complete - patient participated  Level of consciousness: awake and alert  Pain score: 1  Pain management: adequate  Airway patency: patent  Anesthetic complications: No anesthetic complications  PONV Status: controlled  Cardiovascular status: acceptable  Respiratory status: acceptable  Hydration status: acceptable

## 2017-12-15 NOTE — OP NOTE
Primary Low Transverse  Section Operation Note    Emily Bagley  12/15/2017  36w 1 d    Pre-op Diagnosis:   DKA   Non Reassuring Fetal Heart Tones      Post-op Diagnosis:     Same    Procedure(s):   SECTION PRIMARY     Surgeon(s):  Jose Noel III, MD    Anesthesia: Spinal    Staff:   Circulator: Jessy Blackburn RN  Baby Nurse: Fani Denis RN  Assistant: Caren Bass  OB TECH: Kandice Oquendo    Estimated Blood Loss: 500cc      Procedure     The patient was prepped and draped in the normal sterile fashion. A Pfannenstiel skin incision was made with the knife and carried down through the underlying fascia. The fascia was nicked in the midline and extended laterally. The peritoneum was entered. The bladder blade was placed. Bladder flaps were created. The uterine incision was made with the knife. The infant was delivered in atraumatic fashion. The nares and mouth was bulb suctioned. Cord was clamped times 2 and cut. The placenta was delivered intact. The uterus was closed with #1 chromic in a running locking fashion. The fascia was closed with 0 Dexon. The skin was closed with 4-0 Monocryl. The patient tolerated the procedure well and went to the recovery room in stable condition.         GENDER  Female        Jose Noel III, MD     Date: 12/15/2017  Time: 7:48 AM

## 2017-12-15 NOTE — PAYOR COMM NOTE
"[facility]  NPI:[id number]    Utilization Review  Contact: Suad Romero RN  Phone: [phone]  Fax:[phone]    INITIATE INPATIENT AUTHORIZATION  ICD: O82    MOTHER CAME IN FOR NAUSEA AND VOMITING FOUND TO BE IN DKA DELIVERED BY C SECTION FOR NONREASURING FETAL STATUS   36/1  BABY GIRL 4775 GRAMS APGARS 0-5-7 MOTHER AND BABY BOTH HAVE BEEN DISCHARGED TO [facility]  Alonzo Bagley (22 y.o. Female)     Date of Birth Social Security Number Address Home Phone MRN    [id number]  [address]  [place] [address] [phone] [id number]    Judaism Marital Status          [facility] Single       Admission Date Admission Type Admitting Provider Attending Provider Department, Room/Bed    12/15/17 Jose Estevez III, MD  [facility] LABOR DELIVERY, LPAC/1    Discharge Date Discharge Disposition Discharge Destination        12/15/2017 Transfer to Another Facility             Attending Provider: (none)    Allergies:  No Known Allergies    Isolation:  None   Infection:  None   Code Status:  Prior    Ht:  167.6 cm (66\")   Wt:  96.2 kg (212 lb)    Admission Cmt:  None   Principal Problem:  None                Active Insurance as of 12/15/2017     Primary Coverage     Payor Plan Insurance Group Employer/Plan Group    [facility] [place] [facility] [place]      Payor Plan Address Payor Plan Phone Number Effective From Effective To    [address] [address]  9/1/2016     [place] [address]       Subscriber Name Subscriber Birth Date Member ID       ALONZO BAGLEY 1995 [id number]                 Emergency Contacts      (Rel.) Home Phone Work Phone Mobile Phone    Nanci Blackburn (Mother) [phone] -- --    Loki Blackburn (Father) [phone] -- --    Anirudh Borges (Partner) [phone] -- --               History & Physical      Jose Noel III, MD at 12/15/2017  3:30 AM                Chief complaint   Chief Complaint   Patient presents with   • Non-stress " Test     DECREASED FETAL MOVEMENT       History of Present Illness: Patient is a 22 y.o. female who presents with IUP at 36w1d weeks gestation. G 3, P 0110. Patient presents with decreased fetal movement, DKA, History of IUFD, non reassuring fetal heart tones.       Past Medical History:   Diagnosis Date   • Anxiety 2014    took meds briefly   • Diabetes mellitus 2000    Type 1; Diagnosed via vomiting, family MD, sent to [place], sent to , serum glucose >900.   • Gestational hypertension 2016   • Hypertension 2016    With pregnancy (2017-[facility]   • Kidney failure 2016    R/T diabetes (8/10/2017- patient denies)    • Polyhydramnios    IUFD  DKA    Blood Type: O   Fetal Gender: Female    Past Surgical History:   Procedure Laterality Date   • FINGER/THUMB ARTHROPLASTY Left     age 9 yr   • FINGER/THUMB CLOSED REDUCTION Left 2004   • FINGER/THUMB CLOSED REDUCTION       Family History   Problem Relation Age of Onset   • Hypertension Father    • Cancer Maternal Grandfather    • Cancer Paternal Grandmother    • Arthritis Paternal Grandfather    • Heart attack Paternal Grandfather    • Hypertension Paternal Grandfather      Social History   Substance Use Topics   • Smoking status: Never Smoker   • Smokeless tobacco: Never Used   • Alcohol use No     No prescriptions prior to admission.     Allergies:  Review of patient's allergies indicates no known allergies.      Vital Signs  Temp:  [97.4 °F (36.3 °C)-98.2 °F (36.8 °C)] 98.2 °F (36.8 °C)  Heart Rate:  [] 100  Resp:  [18-20] 18  BP: (115-168)/() 136/82    Radiology  Imaging Results (last 24 hours)     ** No results found for the last 24 hours. **          Labs  Lab Results (last 24 hours)     Procedure Component Value Units Date/Time    POC Glucose Once [[id number]]  (Abnormal) Collected:  12/15/17 0212    Specimen:  Blood Updated:  12/15/17 0220     Glucose 332 (H) mg/dL     Narrative:       Meter: [id number] :  [id number] Alexey HALE    Urine Culture - Urine, Urine, Clean Catch [[id number]] Collected:  12/15/17 0232    Specimen:  Urine from Urine, Catheter Updated:  12/15/17 0251    Urinalysis With / Culture If Indicated - Urine, Clean Catch [[id number]]  (Abnormal) Collected:  12/15/17 0232    Specimen:  Urine from Urine, Catheter Updated:  12/15/17 0251     Color, UA Yellow     Appearance, UA Cloudy (A)     pH, UA <=5.0     Specific Gravity, UA 1.023     Glucose,  mg/dL (2+) (A)     Ketones, UA >=160 mg/dL (4+) (A)     Bilirubin, UA Negative     Blood, UA Negative     Protein,  mg/dL (2+) (A)     Leuk Esterase, UA Negative     Nitrite, UA Negative     Urobilinogen, UA 0.2 E.U./dL    Urinalysis, Microscopic Only - Urine, Clean Catch [[id number]]  (Abnormal) Collected:  12/15/17 0232    Specimen:  Urine from Urine, Catheter Updated:  12/15/17 0251     RBC, UA 3-6 (A) /HPF      WBC, UA 3-6 (A) /HPF      Bacteria, UA 1+ (A) /HPF      Squamous Epithelial Cells, UA None Seen /HPF      Hyaline Casts, UA 3-6 /LPF      Methodology Automated Microscopy    Blood Gas, Arterial [[id number]]  (Abnormal) Collected:  12/15/17 0249    Specimen:  Arterial Blood Updated:  12/15/17 0259     Site Arterial: right brachial     Srinivas's Test N/A     pH, Arterial 7.192 (C) pH units      pCO2, Arterial 9.4 (C) mm Hg      pO2, Arterial 126.9 (H) mm Hg      HCO3, Arterial 3.5 (C) mmol/L      Base Excess, Arterial -22.0 mmol/L      O2 Saturation, Arterial 97.8 %      Hemoglobin, Blood Gas 12.0 g/dL      Hematocrit, Blood Gas 35.0 (L) %      Oxyhemoglobin 96.9 %      Methemoglobin 0.30 %      Carboxyhemoglobin 0.6 %      A-a Gradiant 3.5 mmHg      Temperature 98.5 C      Barometric Pressure for Blood Gas 718 mmHg      Modality Room Air     FIO2 21 %     CBC (No Diff) [[id number]]  (Abnormal) Collected:  12/15/17 0302    Specimen:  Blood Updated:  12/15/17 0311     WBC 15.70 (H) 10*3/mm3      RBC 4.20 10*6/mm3      Hemoglobin 11.0 (L) g/dL       Hematocrit 36.2 (L) %      MCV 86.2 fL      MCH 26.2 (L) pg      MCHC 30.4 (L) g/dL      RDW 15.3 (H) %      RDW-SD 47.3 fl      MPV 13.6 (H) fL      Platelets 230 10*3/mm3     POC Glucose Once [[id number]]  (Abnormal) Collected:  12/15/17 0331    Specimen:  Blood Updated:  12/15/17 0338     Glucose 329 (H) mg/dL     Narrative:       Meter: [id number] : [id number] Devika DURHAM    Comprehensive Metabolic Panel [[id number]]  (Abnormal) Collected:  12/15/17 0302    Specimen:  Blood Updated:  12/15/17 0339     Glucose 369 (H) mg/dL      BUN 16 mg/dL      Creatinine 1.05 mg/dL      Sodium 136 mmol/L      Potassium 4.6 mmol/L      Chloride 104 mmol/L      CO2 <10.0 (L) mmol/L      Calcium 9.3 mg/dL      Total Protein 6.6 g/dL      Albumin 3.60 g/dL      ALT (SGPT) 15 U/L      AST (SGOT) 20 U/L      Alkaline Phosphatase 120 (H) U/L       Note New Reference Ranges        Total Bilirubin 0.4 mg/dL      eGFR Non African Amer 67 mL/min/1.73      Globulin 3.0 gm/dL      A/G Ratio 1.2 (L) g/dL      BUN/Creatinine Ratio 15.2     Anion Gap -- mmol/L       Unable to calculate Anion Gap.       Osmolality, Calculated [[id number]]  (Normal) Collected:  12/15/17 0302    Specimen:  Blood Updated:  12/15/17 0339     Osmolality Calc 288.2 mOsm/kg     Acetone [[id number]]  (Abnormal) Collected:  12/15/17 0302    Specimen:  Blood Updated:  12/15/17 0353     Acetone Moderate (A)    POC Glucose Once [[id number]]  (Abnormal) Collected:  12/15/17 0356    Specimen:  Blood Updated:  12/15/17 0417     Glucose 331 (H) mg/dL     Narrative:       Meter: [id number] : [id number] Lazaro Das    POC Glucose Once [[id number]]  (Abnormal) Collected:  12/15/17 0410    Specimen:  Blood Updated:  12/15/17 0417     Glucose 303 (H) mg/dL     Narrative:       Meter: [id number] : [id number] Lazaro Das    POC Glucose Once [[id number]]  (Abnormal) Collected:  12/15/17 0427    Specimen:  Blood Updated:  12/15/17 0433     Glucose 277 (H) mg/dL      Narrative:       Meter: [id number] : [id number] [employer]Game[employer] Lucita    Phosphorus [[id number]]  (Abnormal) Collected:  12/15/17 0423    Specimen:  Blood Updated:  12/15/17 0443     Phosphorus 5.1 (H) mg/dL     Magnesium [[id number]]  (Abnormal) Collected:  12/15/17 0423    Specimen:  Blood Updated:  12/15/17 0444     Magnesium 1.4 (C) mg/dL     Blood Gas, Arterial [[id number]]  (Abnormal) Collected:  12/15/17 0444    Specimen:  Arterial Blood Updated:  12/15/17 0449     Site Arterial: right radial     Srinivas's Test Positive     pH, Arterial 7.160 (C) pH units      pCO2, Arterial 9.1 (C) mm Hg      pO2, Arterial 136.0 (H) mm Hg      HCO3, Arterial 3.2 (C) mmol/L      Base Excess, Arterial -23.0 mmol/L      O2 Saturation, Arterial 98.0 %      Hemoglobin, Blood Gas 11.6 (L) g/dL      Hematocrit, Blood Gas 34.0 (L) %      Oxyhemoglobin 97.3 %      Methemoglobin 0.40 %      Carboxyhemoglobin 0.3 %      Temperature 98.6 C      Barometric Pressure for Blood Gas 718 mmHg      Modality Room Air     FIO2 21 %     POC Glucose Once [[id number]]  (Abnormal) Collected:  12/15/17 0453    Specimen:  Blood Updated:  12/15/17 0459     Glucose 223 (H) mg/dL     Narrative:       Meter: [id number] : [id number] [employer] Lucita    POC Glucose Once [[id number]]  (Normal) Collected:  12/15/17 0620    Specimen:  Blood Updated:  12/15/17 0645     Glucose 85 mg/dL     Narrative:       Meter: [id number] : [id number] Devika DURHAM            Review of Systems    The following systems were reviewed and negative;  ENT, respiratory, cardiovascular, gastrointestinal, genitourinary, breast, endocrine and allergies / immunologic.      Physical Exam:      General Appearance:    Alert, cooperative, in no acute distress   Head:    Normocephalic, without obvious abnormality, atraumatic   Eyes:            Lids and lashes normal, conjunctivae and sclerae normal, no   icterus, no pallor, corneas clear, PERRLA   Ears:    Ears appear intact with no  "abnormalities noted   Throat:   No oral lesions, no thrush, oral mucosa moist   Neck:   No adenopathy, supple, trachea midline, no thyromegaly, no     carotid bruit, no JVD   Back:     No kyphosis present, no scoliosis present, no skin lesions,       erythema or scars, no tenderness to percussion or                   palpation,   range of motion normal   Lungs:     Clear to auscultation,respirations regular, even and                   unlabored    Heart:    Regular rhythm and normal rate, normal S1 and S2, no            murmur, no gallop, no rub, no click   Breast Exam:    Deferred   Abdomen:     Normal bowel sounds, no masses, no organomegaly, soft        non-tender, non-distended, no guarding, no rebound                 tenderness   Genitalia:    Deferred   Extremities:   Moves all extremities well, no edema, no cyanosis, no              redness   Pulses:   Pulses palpable and equal bilaterally   Skin:   No bleeding, bruising or rash   Lymph nodes:   No palpable adenopathy   Neurologic:   Cranial nerves 2 - 12 grossly intact, sensation intact, DTR        present and equal bilaterally         Assessment: Patient is a 22 y.o. female who presents with IUP at 36w1d weeks gestation. G 3, P 0110. Non Reassuring Fetal Heart Tones, Decreased fetal movement.     Chief Complaint   Patient presents with   • Non-stress Test     DECREASED FETAL MOVEMENT       Plan of Care: Admit. Proceed with a primary low transverse  section.       Jose Noel III, MD  12/15/17  7:55 AM       Electronically signed by Jose Noel III, MD at 12/15/2017  7:58 AM        Emergency Department Notes     No notes of this type exist for this encounter.        Hospital Medications (all)       Dose Frequency Start End    ampicillin 2 g/100 mL 0.9% NS (MBP) 2 g Once 12/15/2017 12/15/2017    Sig - Route: Infuse 100 mL into a venous catheter 1 (One) Time. - Intravenous    Linked Group 1:  \"Followed by\" Linked Group Details        lactated " "ringers bolus 1,000 mL 1,000 mL Once As Needed 12/15/2017 12/15/2017    Sig - Route: Infuse 1,000 mL into a venous catheter 1 (One) Time As Needed (for epidural). - Intravenous    acetaminophen (TYLENOL) tablet 650 mg (Discontinued) 650 mg Every 4 Hours PRN 12/15/2017 12/15/2017    Sig - Route: Take 2 tablets by mouth Every 4 (Four) Hours As Needed for Mild Pain  or Headache. - Oral    Reason for Discontinue: Patient Discharge    ampicillin 1 g/100 mL 0.9% NS (MBP) (Discontinued) 1 g Every 4 Hours 12/15/2017 12/15/2017    Sig - Route: Infuse 100 mL into a venous catheter Every 4 (Four) Hours. - Intravenous    Reason for Discontinue: Patient Discharge    Linked Group 1:  \"Followed by\" Linked Group Details        butorphanol (STADOL) injection 1 mg (Discontinued) 1 mg Every 2 Hours PRN 12/15/2017 12/15/2017    Sig - Route: Infuse 1 mL into a venous catheter Every 2 (Two) Hours As Needed for Moderate Pain . - Intravenous    Reason for Discontinue: Patient Discharge    butorphanol (STADOL) injection 2 mg (Discontinued) 2 mg Every 2 Hours PRN 12/15/2017 12/15/2017    Sig - Route: Infuse 1 mL into a venous catheter Every 2 (Two) Hours As Needed for Severe Pain . - Intravenous    Reason for Discontinue: Patient Discharge    dextrose (D50W) solution 12.5 g (Discontinued) 12.5 g Every 15 Minutes PRN 12/15/2017 12/15/2017    Sig - Route: Infuse 25 mL into a venous catheter Every 15 (Fifteen) Minutes As Needed for Low Blood Sugar (for blood glucose < 100 mg/dL). - Intravenous    Reason for Discontinue: Patient Discharge    dextrose (D50W) solution 25 g (Discontinued) 25 g Every 15 Minutes PRN 12/15/2017 12/15/2017    Sig - Route: Infuse 50 mL into a venous catheter Every 15 (Fifteen) Minutes As Needed for Low Blood Sugar (Blood Sugar Less Than 70, Patient Has IV Access - Unresponsive, NPO or Unable To Safely Swallow). - Intravenous    Reason for Discontinue: Patient Discharge    dextrose (GLUTOSE) oral gel 15 g (Discontinued) " "15 g Every 15 Minutes PRN 12/15/2017 12/15/2017    Sig - Route: Take 15 g by mouth Every 15 (Fifteen) Minutes As Needed for Low Blood Sugar (Blood Sugar Less Than 70, Patient Alert, Is Not NPO & Can Safely Swallow). - Oral    Reason for Discontinue: Patient Discharge    dextrose 5 % and sodium chloride 0.45 % infusion (Discontinued) 150 mL/hr Continuous PRN 12/15/2017 12/15/2017    Sig - Route: Infuse 150 mL/hr into a venous catheter Continuous As Needed (Once Glucose Less Than or Equal to 200 mg/dL and Serum Potassium Greater Than 5). - Intravenous    Reason for Discontinue: Patient Discharge    dextrose 5 % and sodium chloride 0.45 % with KCl 20 mEq/L infusion (Discontinued) 150 mL/hr Continuous PRN 12/15/2017 12/15/2017    Sig - Route: Infuse 150 mL/hr into a venous catheter Continuous As Needed (Once Glucose Less Than or Equal to 200 mg/dL and Serum Potassium Less Than or Equal to 5). - Intravenous    Reason for Discontinue: Patient Discharge    diphenhydrAMINE (BENADRYL) capsule 25 mg (Discontinued) 25 mg Nightly PRN 12/15/2017 12/15/2017    Sig - Route: Take 1 capsule by mouth At Night As Needed for Sleep. - Oral    Reason for Discontinue: Patient Discharge    Linked Group 2:  \"Or\" Linked Group Details        diphenhydrAMINE (BENADRYL) injection 25 mg (Discontinued) 25 mg Nightly PRN 12/15/2017 12/15/2017    Sig - Route: Infuse 0.5 mL into a venous catheter At Night As Needed for Sleep. - Intravenous    Reason for Discontinue: Patient Discharge    Linked Group 2:  \"Or\" Linked Group Details        glucagon (human recombinant) (GLUCAGEN DIAGNOSTIC) injection 1 mg (Discontinued) 1 mg Every 15 Minutes PRN 12/15/2017 12/15/2017    Sig - Route: Inject 1 mg under the skin Every 15 (Fifteen) Minutes As Needed (Blood Glucose Less Than 70 - Patient Without IV Access - Unresponsive, NPO or Unable To Safely Swallow). - Subcutaneous    Reason for Discontinue: Patient Discharge    insulin aspart (novoLOG) injection 0-24 " Units (Discontinued) 0-24 Units 4 Times Daily Before Meals & Nightly 12/15/2017 12/15/2017    Sig - Route: Inject 0-24 Units under the skin 4 (Four) Times a Day Before Meals & at Bedtime. - Subcutaneous    insulin aspart (novoLOG) injection 1 Units (Discontinued) 1 Units 3 Times Daily With Meals 12/15/2017 12/15/2017    Sig - Route: Inject 1 Units under the skin 3 (Three) Times a Day With Meals. - Subcutaneous    Reason for Discontinue: Alternate therapy    insulin regular (humuLIN R,novoLIN R) 100 Units in sodium chloride 0.9 % 100 mL (1 Units/mL) infusion (Discontinued) 0.1 Units/kg/hr × 96.2 kg Titrated 12/15/2017 12/15/2017    Sig - Route: Infuse 9.6 Units/hr into a venous catheter Dose Adjusted By Provider As Needed. - Intravenous    Reason for Discontinue: Patient Discharge    insulin regular (humuLIN R,novoLIN R) injection 9.6 Units (Discontinued) 0.1 Units/kg × 96.2 kg Once 12/15/2017 12/15/2017    Sig - Route: Infuse 9.6 Units into a venous catheter 1 (One) Time. - Intravenous    Reason for Discontinue: Patient Discharge    insulin regular (humuLIN R,novoLIN R) injection 9.6 Units (Discontinued) 0.1 Units/kg × 96.2 kg Once As Needed 12/15/2017 12/15/2017    Sig - Route: Infuse 9.6 Units into a venous catheter 1 (One) Time As Needed for High Blood Sugar (if the glucose does not decrease by 10% in the first hour after start of infusion). - Intravenous    Reason for Discontinue: Patient Discharge    lactated ringers infusion (Discontinued) 125 mL/hr Continuous 12/15/2017 12/15/2017    Sig - Route: Infuse 125 mL/hr into a venous catheter Continuous. - Intravenous    lidocaine (XYLOCAINE) 1 % injection 5 mL (Discontinued) 5 mL As Needed 12/15/2017 12/15/2017    Sig - Route: Inject 5 mL into the skin As Needed (IV starts). - Intradermal    Reason for Discontinue: Patient Discharge    Magnesium Sulfate 2 gram infusion- Mg 1.6 - 1.9 mg/dL (Discontinued) 2 g As Needed 12/15/2017 12/15/2017    Sig - Route: Infuse 50  "mL into a venous catheter As Needed (Mg 1.6 - 1.9 mg/dL). - Intravenous    Reason for Discontinue: Patient Discharge    Linked Group 3:  \"Or\" Linked Group Details        magnesium sulfate 3 gram infusion (1gm x 3) - Mg 1.1 - 1.5 mg/dL (Discontinued) 1 g As Needed 12/15/2017 12/15/2017    Sig - Route: Infuse 100 mL into a venous catheter As Needed (Mg 1.1 - 1.5 mg/dL). - Intravenous    Reason for Discontinue: Patient Discharge    Linked Group 3:  \"Or\" Linked Group Details        magnesium sulfate 3 gram infusion (1gm x 3) - Mg 1.1 - 1.5 mg/dL (Discontinued) 1 g Every 1 Hour 12/15/2017 12/15/2017    Sig - Route: Infuse 100 mL into a venous catheter Every 1 (One) Hour. - Intravenous    Reason for Discontinue: Patient Discharge    magnesium sulfate 4 gram infusion - Mg less than or equal to 1mg/dL (Discontinued) 4 g As Needed 12/15/2017 12/15/2017    Sig - Route: Infuse 100 mL into a venous catheter As Needed (Mg less than or equal to 1mg/dL). - Intravenous    Reason for Discontinue: Patient Discharge    Linked Group 3:  \"Or\" Linked Group Details        methylergonovine (METHERGINE) 0.2 MG/ML injection  - ADS Override Pull (Discontinued)   12/15/2017 12/15/2017    Notes to Pharmacy: Created by cabinet override    Reason for Discontinue: Patient Discharge    misoprostol (CYTOTEC) tablet 600 mcg (Discontinued) 600 mcg Once 12/15/2017 12/15/2017    Sig - Route: Take 6 tablets by mouth 1 (One) Time. - Oral    Reason for Discontinue: Patient Discharge    ondansetron (ZOFRAN) injection 4 mg (Discontinued) 4 mg Every 6 Hours PRN 12/15/2017 12/15/2017    Sig - Route: Infuse 2 mL into a venous catheter Every 6 (Six) Hours As Needed for Nausea or Vomiting. - Intravenous    Reason for Discontinue: Patient Discharge    Linked Group 4:  \"Or\" Linked Group Details        ondansetron (ZOFRAN) tablet 4 mg (Discontinued) 4 mg Every 6 Hours PRN 12/15/2017 12/15/2017    Sig - Route: Take 1 tablet by mouth Every 6 (Six) Hours As Needed for " "Nausea or Vomiting. - Oral    Reason for Discontinue: Patient Discharge    Linked Group 4:  \"Or\" Linked Group Details        ondansetron ODT (ZOFRAN-ODT) disintegrating tablet 4 mg (Discontinued) 4 mg Every 6 Hours PRN 12/15/2017 12/15/2017    Sig - Route: Take 1 tablet by mouth Every 6 (Six) Hours As Needed for Nausea or Vomiting. - Oral    Reason for Discontinue: Patient Discharge    Linked Group 4:  \"Or\" Linked Group Details        oxytocin (PITOCIN) 20 units / 1000 ml in lactated Ringer's 1000 mL infusion solution  - ADS Override Pull (Discontinued)   12/15/2017 12/15/2017    Notes to Pharmacy: Jessy Blackburn: cabinet override    Reason for Discontinue: Patient Discharge    promethazine (PHENERGAN) 12.5 mg in sodium chloride 0.9 % 50 mL (Discontinued) 12.5 mg Every 6 Hours PRN 12/15/2017 12/15/2017    Sig - Route: Infuse 12.5 mg into a venous catheter Every 6 (Six) Hours As Needed for Nausea or Vomiting. - Intravenous    Reason for Discontinue: Patient Discharge    Linked Group 5:  \"Or\" Linked Group Details        promethazine (PHENERGAN) injection 12.5 mg (Discontinued) 12.5 mg Every 6 Hours PRN 12/15/2017 12/15/2017    Sig - Route: Inject 0.5 mL into the shoulder, thigh, or buttocks Every 6 (Six) Hours As Needed for Nausea or Vomiting. - Intramuscular    Reason for Discontinue: Patient Discharge    Linked Group 5:  \"Or\" Linked Group Details        promethazine (PHENERGAN) suppository 12.5 mg (Discontinued) 12.5 mg Every 6 Hours PRN 12/15/2017 12/15/2017    Sig - Route: Insert 1 suppository into the rectum Every 6 (Six) Hours As Needed for Nausea or Vomiting. - Rectal    Reason for Discontinue: Patient Discharge    Linked Group 5:  \"Or\" Linked Group Details        promethazine (PHENERGAN) tablet 12.5 mg (Discontinued) 12.5 mg Every 6 Hours PRN 12/15/2017 12/15/2017    Sig - Route: Take 0.5 tablets by mouth Every 6 (Six) Hours As Needed for Nausea or Vomiting. - Oral    Reason for Discontinue: Patient Discharge    " "Linked Group 5:  \"Or\" Linked Group Details        sodium chloride (NS) irrigation solution 1,000 mL (Discontinued) 1,000 mL Once As Needed 12/15/2017 12/15/2017    Sig - Route: Irrigate with 1,000 mL as directed 1 (One) Time As Needed (irrigation). - Irrigation    Reason for Discontinue: Patient Discharge    sodium chloride 0.45 % infusion (Discontinued) 125 mL/hr Continuous 12/15/2017 12/15/2017    Sig - Route: Infuse 125 mL/hr into a venous catheter Continuous. - Intravenous    sodium chloride 0.45 % infusion (Discontinued) 250 mL/hr Continuous 12/15/2017 12/15/2017    Sig - Route: Infuse 250 mL/hr into a venous catheter Continuous. - Intravenous    Reason for Discontinue: Patient Discharge    sodium chloride 0.45 % with KCl 20 mEq/L infusion (Discontinued) 250 mL/hr Continuous PRN 12/15/2017 12/15/2017    Sig - Route: Infuse 250 mL/hr into a venous catheter Continuous As Needed (After Initial 2 Hours - If Potassium Level is Less Than or Equal to 5 (If Greater Than 5 use 0.45%)). - Intravenous    Reason for Discontinue: Patient Discharge    sodium chloride 0.9 % bolus 1,000 mL (Discontinued) 1,000 mL Once 12/15/2017 12/15/2017    Sig - Route: Infuse 1,000 mL into a venous catheter 1 (One) Time. - Intravenous    Reason for Discontinue: Patient Discharge    sodium chloride 0.9 % flush 1-10 mL (Discontinued) 1-10 mL As Needed 12/15/2017 12/15/2017    Sig - Route: Infuse 1-10 mL into a venous catheter As Needed for Line Care. - Intravenous    Reason for Discontinue: Patient Discharge    sodium chloride 0.9 % infusion  - ADS Override Pull (Discontinued)   12/15/2017 12/15/2017    Notes to Pharmacy: Created by cabinet override    Reason for Discontinue: Patient Discharge    sodium chloride 0.9 % infusion (Discontinued) 125 mL/hr Continuous 12/15/2017 12/15/2017    Sig - Route: Infuse 125 mL/hr into a venous catheter Continuous. - Intravenous    terbutaline (BRETHINE) injection 0.25 mg (Discontinued) 0.25 mg As Needed " 12/15/2017 12/15/2017    Sig - Route: Inject 0.25 mL under the skin As Needed (fetal hyperstimulation/distress). - Subcutaneous    Reason for Discontinue: Patient Discharge    zolpidem (AMBIEN) tablet 5 mg (Discontinued) 5 mg Nightly PRN 12/15/2017 12/15/2017    Sig - Route: Take 1 tablet by mouth At Night As Needed for Sleep (if not in active labor). - Oral    Reason for Discontinue: Patient Discharge             Operative/Procedure Notes (all)      Jose Noel III, MD at 12/15/2017  3:50 AM  Version 1 of 1         Primary Low Transverse  Section Operation Note    Emily Bagley  12/15/2017  36w 1 d    Pre-op Diagnosis:   DKA   Non Reassuring Fetal Heart Tones      Post-op Diagnosis:     Same    Procedure(s):   SECTION PRIMARY     Surgeon(s):  Jose Noel III, MD    Anesthesia: Spinal    Staff:   Circulator: Jessy Blackburn RN  Baby Nurse: Fani Denis RN  Assistant: Caren Bass  OB TECH: Kandice Oquendo    Estimated Blood Loss: 500cc      Procedure     The patient was prepped and draped in the normal sterile fashion. A Pfannenstiel skin incision was made with the knife and carried down through the underlying fascia. The fascia was nicked in the midline and extended laterally. The peritoneum was entered. The bladder blade was placed. Bladder flaps were created. The uterine incision was made with the knife. The infant was delivered in atraumatic fashion. The nares and mouth was bulb suctioned. Cord was clamped times 2 and cut. The placenta was delivered intact. The uterus was closed with #1 chromic in a running locking fashion. The fascia was closed with 0 Dexon. The skin was closed with 4-0 Monocryl. The patient tolerated the procedure well and went to the recovery room in stable condition.         GENDER  Female        Jose Noel III, MD     Date: 12/15/2017  Time: 7:48 AM       Electronically signed by Jose Noel III, MD at 12/15/2017  7:55 AM         Physician Progress Notes (last 24 hours) (Notes from 2017  1:33 PM through 12/15/2017  1:33 PM)     No notes of this type exist for this encounter.           Consult Notes (last 24 hours) (Notes from 2017  1:33 PM through 12/15/2017  1:33 PM)      Beck Archer MD at 12/15/2017  5:58 AM  Version 1 of 1     Consult Orders:    1. Inpatient Consult to Hospitalist [[id number]] ordered by Portia Burnette III, MD at 12/15/17 0224                Hospitalist Consult Note        Patient Identification  Name: Emily Bagley  Age/Sex: 22 y.o. female  :  1995        MRN: [id number]  Visit Number: [id number]  PCP: PANFILO Herrera    Inpatient Consult to Hospitalist  Consult performed by: BECK ARCHER  Consult ordered by: PORTIA BURNETTE III          Referring Provider: Dr Burnette   Reason for Consultation: Hyperglycemia      History of present illness:  Patient is a 22 year old female with Type I diabetes. She presented to OB early this morning at 36 weeks gestation with complaints of intractable nausea and vomiting and worsening abdominal pain and shortness of breath. She reports she started vomiting around 3 am on . She did not check her blood glucose level because she could not keep any food down so she didn't think she needed to check her levels or give herself insulin. She presented to the OB floor earlier this evening due to persistent vomiting as well as development of abdominal pain; furthermore, she could not feel her baby moving anymore. Fetal monitoring was reported to be unremarkable at that time and blood work revealed a glucose level in the 80s. She was told everything seemed to be ok and she was subsequently discharged home. She continued to experience intractable vomiting on the way home, however, and her abdominal pain worsened. She also developed worsening shortness of breath. Thus, her family brought her back to the hospital. Blood glucose checked  shortly after arrival had risen to 332. The hospitalist service was consulted at this time, around 230 am. Upon review of her chart, I noticed that her lab work from earlier this evening showed low bicarb of 14 and elevated anion gap of 17. Urinalysis showed 3+ (>1000) glucose and 4+ (>160 ketones). I ordered STAT ABG which showed significant metabolic acidosis, with pH 7.192, PCO2 9.4, and bicarb of only 3.5, confirming diagnosis of diabetic ketoacidosis. Repeat BMP was consistent with these results, showing , <10 bicarb and thus anion gap could not even be calculated. By the time labs were resulting, the patient's baby was now showing signs of distress on fetal monitoring, thus Dr Noel was already arranging for the patient to be taken to the OR for emergent cesarian delivery.     Review of Systems  Review of Systems   Constitutional: Positive for activity change, appetite change and fatigue. Negative for chills, diaphoresis and fever.   HENT: Negative for congestion, nosebleeds, postnasal drip, rhinorrhea and sinus pressure.    Eyes: Negative for photophobia, pain, discharge, redness, itching and visual disturbance.   Respiratory: Positive for shortness of breath. Negative for cough, choking and wheezing.    Cardiovascular: Positive for leg swelling. Negative for chest pain and palpitations.   Gastrointestinal: Positive for abdominal pain, nausea and vomiting. Negative for abdominal distention, blood in stool, constipation and diarrhea.   Endocrine: Positive for polydipsia. Negative for cold intolerance, heat intolerance, polyphagia and polyuria.   Genitourinary: Negative for difficulty urinating, dysuria, hematuria and pelvic pain.   Musculoskeletal: Negative for arthralgias, back pain, joint swelling, myalgias, neck pain and neck stiffness.   Skin: Negative for color change, pallor, rash and wound.   Allergic/Immunologic: Negative for environmental allergies, food allergies and immunocompromised state.    Neurological: Negative for dizziness, tremors, seizures, syncope, weakness, light-headedness, numbness and headaches.   Hematological: Negative for adenopathy. Does not bruise/bleed easily.   Psychiatric/Behavioral: Negative for agitation, behavioral problems and confusion.       History  Past Medical History:   Diagnosis Date   • Anxiety 2014    took meds briefly   • Diabetes mellitus 2000    Type 1; Diagnosed via vomiting, family MD, sent to [place], sent to , serum glucose >900.   • Gestational hypertension 2016   • Hypertension 2016    With pregnancy (2017-pt denies previously recorded dx of TN)   • Kidney failure 2016    R/T diabetes (8/10/2017- patient denies)    • Polyhydramnios       Past Surgical History:   Procedure Laterality Date   • FINGER/THUMB ARTHROPLASTY Left     age 9 yr   • FINGER/THUMB CLOSED REDUCTION Left 2004   • FINGER/THUMB CLOSED REDUCTION     ,  Family History   Problem Relation Age of Onset   • Hypertension Father    • Cancer Maternal Grandfather    • Cancer Paternal Grandmother    • Arthritis Paternal Grandfather    • Heart attack Paternal Grandfather    • Hypertension Paternal Grandfather       Social History   Substance Use Topics   • Smoking status: Never Smoker   • Smokeless tobacco: Never Used   • Alcohol use No      ALLERGIES: Review of patient's allergies indicates no known allergies.    Objective     Vital Signs   Temp:  [97.4 °F (36.3 °C)-97.9 °F (36.6 °C)] 97.9 °F (36.6 °C)  Heart Rate:  [] 125  Resp:  [18-20] 18  BP: (115-168)/() 145/81  Last 2 weights    12/15/17  0130   Weight: 96.2 kg (212 lb)     Body mass index is 34.22 kg/(m^2).    Physical Exam:  Physical Exam   Constitutional: She is oriented to person, place, and time.   Patient is now s/p cesarian delivery. Her breathing has slowed and is less labored. She is in no acute distress.   HENT:   Head: Normocephalic and atraumatic.   Oropharnyx clear. Oral mucosa and lips are very dry.   Eyes:  Conjunctivae and EOM are normal. Pupils are equal, round, and reactive to light.   Neck: Normal range of motion. Neck supple. No JVD present.   Cardiovascular: Normal heart sounds and intact distal pulses.  Exam reveals no friction rub.    No murmur heard.  Tachycardic, regular rhythm   Pulmonary/Chest:   Mildly tachypneic with shallow breathing, but less labored compared to before C/S   Abdominal: Soft. Bowel sounds are normal. She exhibits no distension.   Palpation deferred as she just returned from OR for C/S. Surgical incision is dry and clean at this time.   Musculoskeletal: Normal range of motion. She exhibits edema (1+ pitting edema b/l lower ext). She exhibits no tenderness.   Lymphadenopathy:     She has no cervical adenopathy.   Neurological: She is alert and oriented to person, place, and time.   No focal deficits appreciated on exam   Skin: Skin is warm and dry. No rash noted. No erythema. No pallor.   Psychiatric: She has a normal mood and affect. Her behavior is normal. Judgment and thought content normal.       Results Review:      Results from last 7 days  Lab Units 12/15/17  0302 12/14/17  1900   WBC 10*3/mm3 15.70* 12.03   HEMOGLOBIN g/dL 11.0* 12.0   PLATELETS 10*3/mm3 230 205           Results from last 7 days  Lab Units 12/15/17  0302 12/14/17  1900   SODIUM mmol/L 136 137   POTASSIUM mmol/L 4.6 4.0   CHLORIDE mmol/L 104 106   CO2 mmol/L <10.0* 14.1*   BUN mg/dL 16 13   CREATININE mg/dL 1.05 0.78   CALCIUM mg/dL 9.3 9.9   GLUCOSE mg/dL 369* 87       Results from last 7 days  Lab Units 12/15/17  0423   MAGNESIUM mg/dL 1.4*     No results found for: HGBA1C    Results from last 7 days  Lab Units 12/15/17  0302 12/14/17  1900   BILIRUBIN mg/dL 0.4 0.5   ALK PHOS U/L 120* 123*   AST (SGOT) U/L 20 16   ALT (SGPT) U/L 15 11                   Results from last 7 days  Lab Units 12/15/17  0444   PH, ARTERIAL pH units 7.160*   PO2 ART mm Hg 136.0*   PCO2, ARTERIAL mm Hg 9.1*   HCO3 ART mmol/L 3.2*         I have reviewed the patient's laboratory results.     Imaging Results (last 72 hours)     ** No results found for the last 72 hours. **          I have personally reviewed the patient's radiologic imaging.      Assessment/Plan     Active Problems:  - Diabetic ketoacidosis: collaborated with both Dr Noel, OB, and Dr Guerrier, anesthesiology, to initiate IV insulin drip per DKA protocol while patient was in the OR for emergent cesarian section. CCU nurses assisted with setup and monitored blood glucose levels intra-operatively. Patient is now s/p cesarian section and resting in recovery. Her kussmal breathing persists but is significantly improved. Blood glucose levels are steadily trending down. Will monitor BMP q4h. Orders are in to adjust IV maintenance fluids as glucose levels begin to stabilize. Greatly appreciate Dr Noel and Dr Guerrier in the management of this patient. Also appreciate the hard work of nurses and other ancillary staff, both in L&D and those who came over from the CCU. Due to lack of higher acuity level beds, Dr Noel has elected to transfer the patient to [facility] for further management. While we are waiting to transportation to be arranged, I will continue to be available as needed.    Thank you for the consultation.         I discussed the patients findings and my recommendations with the patient, her family, Dr Noel, Dr Guerrier and L&D and CCU nurses.    * Patient is high risk due to diabetic ketoacidosis requiring emergent cesarian section due to fetal distress    Beck Archer MD  12/15/17  5:58 AM           Electronically signed by Beck Archer MD at 12/15/2017  6:34 AM

## 2017-12-15 NOTE — ANESTHESIA PROCEDURE NOTES
Spinal Block    Patient location during procedure: OB  Start Time: 12/15/2017 3:48 AM  Stop Time: 12/15/2017 3:51 AM  Indication:at surgeon's request, post-op pain management and procedure for pain  Performed By  Anesthesiologist: TOSHIA LOWERY  Preanesthetic Checklist  Completed: patient identified, site marked, surgical consent, pre-op evaluation, timeout performed, IV checked, risks and benefits discussed and monitors and equipment checked  Spinal Block Prep:  Patient Position:sitting  Sterile Tech:cap, gloves, mask and sterile barriers  Prep:Betadine  Patient Monitoring:blood pressure monitoring, continuous pulse oximetry and EKG  Spinal Block Procedure  Approach:midline  Guidance:landmark technique and palpation technique  Location:L3-L4  Needle Type:Pencan  Needle Gauge:24 G  Placement of Spinal needle event:cerebrospinal fluid aspirated  Paresthesia: no  Fluid Appearance:clear  Post Assessment  Patient Tolerance:patient tolerated the procedure well with no apparent complications  Complications no  Additional Notes  Injected Bupivacaine 0.75% with Dextrose 8.25% (1.6 cc) with Duramorph 0.3 mg and Fentanyl 25 mcg intrathecal.

## 2017-12-15 NOTE — NURSING NOTE
Discharge instructions given understanding verbalized appointment for [facility]ST on Monday the 18th at 2:00pm no questions or concerns patient left floor ambulatory

## 2017-12-15 NOTE — PROGRESS NOTES
Adult Nutrition  Assessment/PES    Patient Name:  Emily Bagley  YOB: 1995  MRN: [id number]  Admit Date:  12/15/2017    Assessment Date:  12/15/2017    Comments:  Patient treated for DKA after ermergent c/-section. Infant transferred to  NICU,  Will follow as diet advances and increase po for glucose control post partum          Reason for Assessment       12/15/17 1446    Reason for Assessment    Reason For Assessment/Visit physician consult    Time Spent (min) 10    Diagnosis Diagnosis    Endocrine DM Type 1    Metabolic/ICU Ketoacidosis    Obstetrical Diagnosis High risk pregnancy   s/p emergent                 Anthropometrics       12/15/17 1459    Anthropometrics (Special Considerations)    Weight Change Gain    Weight Change Amount 20    Weight Change Time Frame 2 weeks    Assessment of Weight Change Excess            Labs/Tests/Procedures/Meds       12/15/17 1448    Labs/Tests/Procedures/Meds    Labs/Tests Review Glucose;Hgb A1C;Other (comment);Urinary ketone    Medication Review Insulin            Physical Findings       12/15/17 1500    Physical Findings/Assessment    Additional Documentation Physical Appearance (Group)    Physical Appearance    Skin other (see comments)   flushed              Nutrition Prescription Ordered       12/15/17 1449    Nutrition Prescription PO    Current PO Diet Clear Liquid              Problem/Interventions:            Problem 3       12/15/17 1450    Nutrition Diagnoses Problem 3    Problem 3 Nutrition Appropriate for Condition at this Time    Etiology (related to) Medical Diagnosis    Endocrine DM Type 1    Metabolic/ICU Ketoacidosis    Obstetrical Diagnosis High risk pregnancy   s/p emergent c/section                Intervention Goal       12/15/17 1459    Intervention Goal    General Nutrition support treatment    PO Tolerate PO            Nutrition Intervention       12/15/17 1500    Nutrition Intervention    RD/Tech Action Other (comment)    await diet order advancement              Education/Evaluation       12/15/17 1504    Monitor/Evaluation    Monitor PO intake;Pertinent labs   BS/food intake.  Glucose control post partum, affected by delivery and  surgery        Electronically signed by:  Mallory Clemente RD  12/15/17 3:14 PM

## 2017-12-16 LAB
ALBUMIN SERPL-MCNC: 2.3 G/DL (ref 3.2–4.8)
ALBUMIN/GLOB SERPL: 1.2 G/DL (ref 1.5–2.5)
ALP SERPL-CCNC: 87 U/L (ref 25–100)
ALT SERPL W P-5'-P-CCNC: 9 U/L (ref 7–40)
ANION GAP SERPL CALCULATED.3IONS-SCNC: 6 MMOL/L (ref 3–11)
AST SERPL-CCNC: 21 U/L (ref 0–33)
BACTERIA SPEC AEROBE CULT: NORMAL
BILIRUB SERPL-MCNC: 0.3 MG/DL (ref 0.3–1.2)
BILIRUB UR QL STRIP: ABNORMAL
BUN BLD-MCNC: 15 MG/DL (ref 9–23)
BUN/CREAT SERPL: 25 (ref 7–25)
CALCIUM SPEC-SCNC: 8 MG/DL (ref 8.7–10.4)
CHLORIDE SERPL-SCNC: 106 MMOL/L (ref 99–109)
CLARITY UR: CLEAR
CO2 SERPL-SCNC: 18 MMOL/L (ref 20–31)
COLOR UR: YELLOW
CREAT BLD-MCNC: 0.6 MG/DL (ref 0.6–1.3)
DEPRECATED RDW RBC AUTO: 44.7 FL (ref 37–54)
ERYTHROCYTE [DISTWIDTH] IN BLOOD BY AUTOMATED COUNT: 15.1 % (ref 11.3–14.5)
GFR SERPL CREATININE-BSD FRML MDRD: 127 ML/MIN/1.73
GLOBULIN UR ELPH-MCNC: 1.9 GM/DL
GLUCOSE BLD-MCNC: 172 MG/DL (ref 70–100)
GLUCOSE BLDC GLUCOMTR-MCNC: 109 MG/DL (ref 70–130)
GLUCOSE BLDC GLUCOMTR-MCNC: 114 MG/DL (ref 70–130)
GLUCOSE BLDC GLUCOMTR-MCNC: 138 MG/DL (ref 70–130)
GLUCOSE BLDC GLUCOMTR-MCNC: 171 MG/DL (ref 70–130)
GLUCOSE BLDC GLUCOMTR-MCNC: 172 MG/DL (ref 70–130)
GLUCOSE BLDC GLUCOMTR-MCNC: 180 MG/DL (ref 70–130)
GLUCOSE BLDC GLUCOMTR-MCNC: 181 MG/DL (ref 70–130)
GLUCOSE BLDC GLUCOMTR-MCNC: 194 MG/DL (ref 70–130)
GLUCOSE BLDC GLUCOMTR-MCNC: 219 MG/DL (ref 70–130)
GLUCOSE BLDC GLUCOMTR-MCNC: 44 MG/DL (ref 70–130)
GLUCOSE BLDC GLUCOMTR-MCNC: 44 MG/DL (ref 70–130)
GLUCOSE BLDC GLUCOMTR-MCNC: 70 MG/DL (ref 70–130)
GLUCOSE BLDC GLUCOMTR-MCNC: 76 MG/DL (ref 70–130)
GLUCOSE BLDC GLUCOMTR-MCNC: 87 MG/DL (ref 70–130)
GLUCOSE UR STRIP-MCNC: ABNORMAL MG/DL
HCT VFR BLD AUTO: 26.5 % (ref 34.5–44)
HGB BLD-MCNC: 8.6 G/DL (ref 11.5–15.5)
HGB UR QL STRIP.AUTO: NEGATIVE
KETONES UR QL STRIP: ABNORMAL
LEUKOCYTE ESTERASE UR QL STRIP.AUTO: NEGATIVE
MAGNESIUM SERPL-MCNC: 1.6 MG/DL (ref 1.3–2.7)
MCH RBC QN AUTO: 26.5 PG (ref 27–31)
MCHC RBC AUTO-ENTMCNC: 32.5 G/DL (ref 32–36)
MCV RBC AUTO: 81.5 FL (ref 80–99)
NITRITE UR QL STRIP: NEGATIVE
PH UR STRIP.AUTO: 5.5 [PH] (ref 5–8)
PLATELET # BLD AUTO: 150 10*3/MM3 (ref 150–450)
PMV BLD AUTO: 12.4 FL (ref 6–12)
POTASSIUM BLD-SCNC: 4 MMOL/L (ref 3.5–5.5)
PROT SERPL-MCNC: 4.2 G/DL (ref 5.7–8.2)
PROT UR QL STRIP: NEGATIVE
RBC # BLD AUTO: 3.25 10*6/MM3 (ref 3.89–5.14)
SODIUM BLD-SCNC: 130 MMOL/L (ref 132–146)
SP GR UR STRIP: 1.02 (ref 1–1.03)
UROBILINOGEN UR QL STRIP: ABNORMAL
WBC NRBC COR # BLD: 10.48 10*3/MM3 (ref 4.5–13.5)

## 2017-12-16 PROCEDURE — 63710000001 INSULIN DETEMIR PER 5 UNITS: Performed by: OBSTETRICS & GYNECOLOGY

## 2017-12-16 PROCEDURE — 81003 URINALYSIS AUTO W/O SCOPE: CPT | Performed by: OBSTETRICS & GYNECOLOGY

## 2017-12-16 PROCEDURE — 83735 ASSAY OF MAGNESIUM: CPT | Performed by: OBSTETRICS & GYNECOLOGY

## 2017-12-16 PROCEDURE — 80053 COMPREHEN METABOLIC PANEL: CPT | Performed by: OBSTETRICS & GYNECOLOGY

## 2017-12-16 PROCEDURE — 99231 SBSQ HOSP IP/OBS SF/LOW 25: CPT | Performed by: OBSTETRICS & GYNECOLOGY

## 2017-12-16 PROCEDURE — 85027 COMPLETE CBC AUTOMATED: CPT | Performed by: OBSTETRICS & GYNECOLOGY

## 2017-12-16 PROCEDURE — 82962 GLUCOSE BLOOD TEST: CPT

## 2017-12-16 PROCEDURE — 63710000001 INSULIN REGULAR HUMAN PER 5 UNITS: Performed by: OBSTETRICS & GYNECOLOGY

## 2017-12-16 RX ORDER — HYDROCODONE BITARTRATE AND ACETAMINOPHEN 7.5; 325 MG/1; MG/1
1 TABLET ORAL EVERY 4 HOURS PRN
Status: DISCONTINUED | OUTPATIENT
Start: 2017-12-16 | End: 2017-12-19 | Stop reason: HOSPADM

## 2017-12-16 RX ADMIN — HYDROCODONE BITARTRATE AND ACETAMINOPHEN 1 TABLET: 7.5; 325 TABLET ORAL at 23:40

## 2017-12-16 RX ADMIN — ACETAMINOPHEN 650 MG: 325 TABLET ORAL at 17:30

## 2017-12-16 RX ADMIN — SIMETHICONE CHEW TAB 80 MG 80 MG: 80 TABLET ORAL at 04:06

## 2017-12-16 RX ADMIN — IBUPROFEN 600 MG: 600 TABLET, FILM COATED ORAL at 04:06

## 2017-12-16 RX ADMIN — HYDROCODONE BITARTRATE AND ACETAMINOPHEN 1 TABLET: 7.5; 325 TABLET ORAL at 04:06

## 2017-12-16 RX ADMIN — INSULIN HUMAN 10 UNITS: 100 INJECTION, SOLUTION PARENTERAL at 12:29

## 2017-12-16 RX ADMIN — INSULIN LISPRO 3 UNITS: 100 INJECTION, SOLUTION INTRAVENOUS; SUBCUTANEOUS at 07:44

## 2017-12-16 RX ADMIN — INSULIN DETEMIR 30 UNITS: 100 INJECTION, SOLUTION SUBCUTANEOUS at 10:46

## 2017-12-16 RX ADMIN — IBUPROFEN 600 MG: 600 TABLET, FILM COATED ORAL at 23:39

## 2017-12-16 RX ADMIN — PRENATAL VIT W/ FE FUMARATE-FA TAB 27-0.8 MG 1 TABLET: 27-0.8 TAB at 10:49

## 2017-12-16 RX ADMIN — SODIUM CHLORIDE, POTASSIUM CHLORIDE, SODIUM LACTATE AND CALCIUM CHLORIDE 999 ML/HR: 600; 310; 30; 20 INJECTION, SOLUTION INTRAVENOUS at 03:09

## 2017-12-16 RX ADMIN — INSULIN LISPRO 3 UNITS: 100 INJECTION, SOLUTION INTRAVENOUS; SUBCUTANEOUS at 06:17

## 2017-12-16 RX ADMIN — INSULIN LISPRO 5 UNITS: 100 INJECTION, SOLUTION INTRAVENOUS; SUBCUTANEOUS at 12:28

## 2017-12-16 RX ADMIN — INSULIN HUMAN 8 UNITS: 100 INJECTION, SOLUTION PARENTERAL at 16:59

## 2017-12-16 RX ADMIN — INSULIN LISPRO 3 UNITS: 100 INJECTION, SOLUTION INTRAVENOUS; SUBCUTANEOUS at 00:01

## 2017-12-16 RX ADMIN — HYDROCODONE BITARTRATE AND ACETAMINOPHEN 1 TABLET: 7.5; 325 TABLET ORAL at 13:06

## 2017-12-16 RX ADMIN — INSULIN LISPRO 3 UNITS: 100 INJECTION, SOLUTION INTRAVENOUS; SUBCUTANEOUS at 10:46

## 2017-12-16 RX ADMIN — HYDROCODONE BITARTRATE AND ACETAMINOPHEN 1 TABLET: 7.5; 325 TABLET ORAL at 17:30

## 2017-12-16 RX ADMIN — ACETAMINOPHEN 650 MG: 325 TABLET ORAL at 13:07

## 2017-12-16 NOTE — PROGRESS NOTES
Postpartum Progress Note    Patient name: Emily Bagley  YOB: 1995   MRN: [id number]  Referring Provider: Milligan,Douglas A   Admission Date: 12/15/2017  Date of Service: 2017    ID: 22 y.o.     Diagnosis:   S/p  delivery * No surgery found *   Patient Active Problem List   Diagnosis   • Pre-existing type 1 diabetes mellitus during pregnancy in third trimester   • Polyhydramnios   • Chronic hypertension in pregnancy   • PIH (pregnancy induced hypertension)   • DKA (diabetic ketoacidoses)   • Diabetes education, encounter for   • Intrauterine fetal death in pregnancy, s/p induction, spontaneous vaginal delivery 10/9/16, Laborist Dr Juan   • Lactic acidosis   • Hypertension   • N&V (nausea and vomiting)   • Hypothyroid   • Endometritis   • IDDM (insulin dependent diabetes mellitus)   • Pregnancy   • Non-stress test reactive   • Diabetes mellitus complicating pregnancy, antepartum, third trimester   • DM (diabetes mellitus) in pregnancy   • Diabetes mellitus in pregnancy   • Nausea and vomiting in pregnancy   • DKA, type 1   • Nausea and vomiting during pregnancy   •  delivery delivered       Subjective:      No complaints.  Moderate lochia.  Ambulating, voiding, tolerating diet.  Pain well controlled.    Objective:      Vital signs:  Vital Signs Range for the last 24 hours  Temperature: Temp:  [98 °F (36.7 °C)-98.9 °F (37.2 °C)] 98.4 °F (36.9 °C)   Temp Source: Temp src: Oral   BP: BP: (108-123)/(69-88) 123/88   Pulse: Heart Rate:  [70-98] 88   Respirations: Resp:  [16-18] 16   Weight: 92.3 kg (203 lb 8 oz)     General: Alert & oriented x4, in no apparent distress  Abdomen: soft, nontender  Uterus: firm, nontender  Incision: clean, dry, intact  Extremities: nontender; no edema      Labs:  Lab Results   Component Value Date    WBC 10.48 2017    HGB 8.6 (L) 2017    HCT 26.5 (L) 2017    MCV 81.5 2017     2017       Results from last  7 days  Lab Units 12/15/17  0846   ABO TYPING  O   RH TYPING  Negative     External Prenatal Results         Pregnancy Outside Results - these were transcribed from office records.  See scanned records for details. Date Time   Hgb ^ 12.9 g/dL 17    Hct      ABO ^ O  17    Rh ^ Negative  17    Antibody Screen ^ Negative  17    Glucose Fasting GTT      Glucose Tolerance Test 1 hour      Glucose Tolerance Test 3 hour      Gonorrhea (discrete) ^ NEG  17    Chlamydia (discrete) ^ NEG  17    RPR ^ Non-Reactive  17    VDRL      Syphillis Antibody      Rubella ^ Immune  17    HBsAg ^ Negative  17    Herpes Simplex Virus PCR      Herpes Simplex VIrus Culture      HIV ^ Non-Reactive  17    Hep C RNA Quant PCR      Hep C Antibody ^ non reactive  16    Urine Drug Screen      AFP      Group B Strep      GBS Susceptibility to Clindamycin      GBS Susceptibility to Eythromycin      Fetal Fibronectin      Genetic Testing, Maternal Blood             Legend: ^: Historical          Assessment/Plan:      1. S/p  delivery: Continue postoperative care.  Doing well.  2. IDDM    Will restart insulin: 1 unit of regular for every 8 grams of carbohyrates and Levemir 30 mg q AM.  I spent 15 minutes with this patient of which greater than 50% was face to face counseling and coordination of care. Questions were answered.    Ben Whitaker MD  2017

## 2017-12-17 PROBLEM — E10.10 DKA, TYPE 1 (HCC): Status: RESOLVED | Noted: 2017-12-15 | Resolved: 2017-12-17

## 2017-12-17 PROBLEM — O24.913 DIABETES MELLITUS COMPLICATING PREGNANCY, ANTEPARTUM, THIRD TRIMESTER: Status: RESOLVED | Noted: 2017-11-15 | Resolved: 2017-12-17

## 2017-12-17 LAB
BACTERIA SPEC AEROBE CULT: NORMAL
BACTERIA SPEC AEROBE CULT: NORMAL
GLUCOSE BLDC GLUCOMTR-MCNC: 106 MG/DL (ref 70–130)
GLUCOSE BLDC GLUCOMTR-MCNC: 142 MG/DL (ref 70–130)
GLUCOSE BLDC GLUCOMTR-MCNC: 57 MG/DL (ref 70–130)
GLUCOSE BLDC GLUCOMTR-MCNC: 71 MG/DL (ref 70–130)
GLUCOSE BLDC GLUCOMTR-MCNC: 78 MG/DL (ref 70–130)
GLUCOSE BLDC GLUCOMTR-MCNC: 86 MG/DL (ref 70–130)

## 2017-12-17 PROCEDURE — 99231 SBSQ HOSP IP/OBS SF/LOW 25: CPT | Performed by: OBSTETRICS & GYNECOLOGY

## 2017-12-17 PROCEDURE — 82962 GLUCOSE BLOOD TEST: CPT

## 2017-12-17 PROCEDURE — 63710000001 INSULIN DETEMIR PER 5 UNITS: Performed by: OBSTETRICS & GYNECOLOGY

## 2017-12-17 RX ORDER — LISINOPRIL 10 MG/1
10 TABLET ORAL
Status: DISCONTINUED | OUTPATIENT
Start: 2017-12-17 | End: 2017-12-19 | Stop reason: HOSPADM

## 2017-12-17 RX ORDER — LABETALOL HYDROCHLORIDE 5 MG/ML
20-80 INJECTION, SOLUTION INTRAVENOUS
Status: DISPENSED | OUTPATIENT
Start: 2017-12-17 | End: 2017-12-18

## 2017-12-17 RX ADMIN — LISINOPRIL 10 MG: 10 TABLET ORAL at 11:42

## 2017-12-17 RX ADMIN — INSULIN HUMAN 10 UNITS: 100 INJECTION, SOLUTION PARENTERAL at 11:44

## 2017-12-17 RX ADMIN — PRENATAL VIT W/ FE FUMARATE-FA TAB 27-0.8 MG 1 TABLET: 27-0.8 TAB at 08:01

## 2017-12-17 RX ADMIN — INSULIN HUMAN 6 UNITS: 100 INJECTION, SOLUTION PARENTERAL at 07:52

## 2017-12-17 RX ADMIN — HYDROCODONE BITARTRATE AND ACETAMINOPHEN 1 TABLET: 7.5; 325 TABLET ORAL at 12:52

## 2017-12-17 RX ADMIN — LABETALOL HYDROCHLORIDE 20 MG: 5 INJECTION INTRAVENOUS at 17:58

## 2017-12-17 RX ADMIN — HYDROCODONE BITARTRATE AND ACETAMINOPHEN 1 TABLET: 7.5; 325 TABLET ORAL at 18:01

## 2017-12-17 RX ADMIN — INSULIN DETEMIR 30 UNITS: 100 INJECTION, SOLUTION SUBCUTANEOUS at 08:01

## 2017-12-17 NOTE — PROGRESS NOTES
2017  PPD #2    Subjective   Emily feels well.  Patient describes her lochia less than menses.  Pain is well controlled  Her infant however is doing poorly in the NICU       Objective   Temp: Temp:  [97.9 °F (36.6 °C)-98.4 °F (36.9 °C)] 98.2 °F (36.8 °C) Temp src: Oral   BP: BP: (132-139)/(85-96) 139/96        Pulse: Heart Rate:  [] 84  RR: Resp:  [16-20] 18    General:  No acute distress   Abdomen: Fundus firm and beneath umbilicus, incision intact   Extremities: Edema    Pelvis: deferred     Lab Results   Component Value Date    WBC 10.48 2017    HGB 8.6 (L) 2017    HCT 26.5 (L) 2017    MCV 81.5 2017     2017    HEPBSAG Negative 2017       Assessment  1. PPD# 2 after Primary  (LTCS)   2. Type 1 DM  3. History of DKA  4. History of non reassuring FHR tracing    Plan  1. Continue current insulin  2. Add lisinopril  3. To [facility] on pass      This note has been electronically signed.    Satinder Lewis MD  2017

## 2017-12-17 NOTE — PLAN OF CARE
Problem: Postpartum ( Delivery) (Adult)  Intervention: Monitor/Manage Pain    17 1232   Manage Acute Burn Pain   Bowel Intervention ambulation promoted;adequate fluid intake promoted   Pain Management Interventions medication offered but refused       Intervention: Prevent/Manage DVT/VTE Risk    17 1232   Support Surgical/Anesthesia Recovery   Venous Thromboembolism Prevent/Manage sequential compression devices off;ambulation promoted;compression stockings on       Intervention: Monitor/Manage Bleeding    17 1232   Cardiac Interventions    Bleed Management Rh status confirmed;uterine massage provided (delivered)           Problem: Diabetes, Type 1 (Adult)  Intervention: Support/Optimize Psychosocial Response to Condition    17 1232   Coping Strategies   Supportive Measures active listening utilized;counseling provided       Intervention: Optimize Glycemic Control    17 1232   Nutrition Interventions   Glycemic Management blood glucose monitoring;insulin dose matched to carbohydrate intake

## 2017-12-18 LAB
ALP SERPL-CCNC: 99 U/L (ref 25–100)
ALT SERPL W P-5'-P-CCNC: 12 U/L (ref 7–40)
AST SERPL-CCNC: 20 U/L (ref 0–33)
BILIRUB SERPL-MCNC: 0.3 MG/DL (ref 0.3–1.2)
CREAT BLD-MCNC: 0.6 MG/DL (ref 0.6–1.3)
DEPRECATED RDW RBC AUTO: 46.6 FL (ref 37–54)
ERYTHROCYTE [DISTWIDTH] IN BLOOD BY AUTOMATED COUNT: 15.5 % (ref 11.3–14.5)
GLUCOSE BLD-MCNC: 423 MG/DL (ref 70–100)
GLUCOSE BLDC GLUCOMTR-MCNC: 109 MG/DL (ref 70–130)
GLUCOSE BLDC GLUCOMTR-MCNC: 133 MG/DL (ref 70–130)
GLUCOSE BLDC GLUCOMTR-MCNC: 144 MG/DL (ref 70–130)
GLUCOSE BLDC GLUCOMTR-MCNC: 203 MG/DL (ref 70–130)
GLUCOSE BLDC GLUCOMTR-MCNC: 308 MG/DL (ref 70–130)
GLUCOSE BLDC GLUCOMTR-MCNC: 410 MG/DL (ref 70–130)
GLUCOSE BLDC GLUCOMTR-MCNC: 427 MG/DL (ref 70–130)
GLUCOSE BLDC GLUCOMTR-MCNC: 478 MG/DL (ref 70–130)
HCT VFR BLD AUTO: 30.1 % (ref 34.5–44)
HGB BLD-MCNC: 9.3 G/DL (ref 11.5–15.5)
LDH SERPL-CCNC: 238 U/L (ref 120–246)
MCH RBC QN AUTO: 25.9 PG (ref 27–31)
MCHC RBC AUTO-ENTMCNC: 30.9 G/DL (ref 32–36)
MCV RBC AUTO: 83.8 FL (ref 80–99)
PLATELET # BLD AUTO: 162 10*3/MM3 (ref 150–450)
PMV BLD AUTO: 12.9 FL (ref 6–12)
RBC # BLD AUTO: 3.59 10*6/MM3 (ref 3.89–5.14)
URATE SERPL-MCNC: 4.9 MG/DL (ref 3.1–7.8)
WBC NRBC COR # BLD: 7.7 10*3/MM3 (ref 4.5–13.5)

## 2017-12-18 PROCEDURE — 84460 ALANINE AMINO (ALT) (SGPT): CPT | Performed by: OBSTETRICS & GYNECOLOGY

## 2017-12-18 PROCEDURE — 63710000001 INSULIN DETEMIR PER 5 UNITS: Performed by: OBSTETRICS & GYNECOLOGY

## 2017-12-18 PROCEDURE — 82565 ASSAY OF CREATININE: CPT | Performed by: OBSTETRICS & GYNECOLOGY

## 2017-12-18 PROCEDURE — 84550 ASSAY OF BLOOD/URIC ACID: CPT | Performed by: OBSTETRICS & GYNECOLOGY

## 2017-12-18 PROCEDURE — 63710000001 INSULIN LISPRO (HUMAN) PER 5 UNITS: Performed by: OBSTETRICS & GYNECOLOGY

## 2017-12-18 PROCEDURE — 84450 TRANSFERASE (AST) (SGOT): CPT | Performed by: OBSTETRICS & GYNECOLOGY

## 2017-12-18 PROCEDURE — 82947 ASSAY GLUCOSE BLOOD QUANT: CPT

## 2017-12-18 PROCEDURE — 99231 SBSQ HOSP IP/OBS SF/LOW 25: CPT | Performed by: OBSTETRICS & GYNECOLOGY

## 2017-12-18 PROCEDURE — 82247 BILIRUBIN TOTAL: CPT | Performed by: OBSTETRICS & GYNECOLOGY

## 2017-12-18 PROCEDURE — 63710000001 INSULIN REGULAR HUMAN PER 5 UNITS: Performed by: OBSTETRICS & GYNECOLOGY

## 2017-12-18 PROCEDURE — 83615 LACTATE (LD) (LDH) ENZYME: CPT | Performed by: OBSTETRICS & GYNECOLOGY

## 2017-12-18 PROCEDURE — 84075 ASSAY ALKALINE PHOSPHATASE: CPT | Performed by: OBSTETRICS & GYNECOLOGY

## 2017-12-18 PROCEDURE — 85027 COMPLETE CBC AUTOMATED: CPT | Performed by: OBSTETRICS & GYNECOLOGY

## 2017-12-18 RX ORDER — NIFEDIPINE 30 MG/1
30 TABLET, EXTENDED RELEASE ORAL
Status: DISCONTINUED | OUTPATIENT
Start: 2017-12-18 | End: 2017-12-19 | Stop reason: HOSPADM

## 2017-12-18 RX ADMIN — INSULIN HUMAN 10 UNITS: 100 INJECTION, SOLUTION PARENTERAL at 12:02

## 2017-12-18 RX ADMIN — HYDROCODONE BITARTRATE AND ACETAMINOPHEN 1 TABLET: 7.5; 325 TABLET ORAL at 18:30

## 2017-12-18 RX ADMIN — LISINOPRIL 10 MG: 10 TABLET ORAL at 08:55

## 2017-12-18 RX ADMIN — INSULIN LISPRO 5 UNITS: 100 INJECTION, SOLUTION INTRAVENOUS; SUBCUTANEOUS at 13:54

## 2017-12-18 RX ADMIN — PRENATAL VIT W/ FE FUMARATE-FA TAB 27-0.8 MG 1 TABLET: 27-0.8 TAB at 08:55

## 2017-12-18 RX ADMIN — LABETALOL HYDROCHLORIDE 40 MG: 5 INJECTION INTRAVENOUS at 06:36

## 2017-12-18 RX ADMIN — INSULIN HUMAN 10 UNITS: 100 INJECTION, SOLUTION PARENTERAL at 17:22

## 2017-12-18 RX ADMIN — INSULIN LISPRO 14 UNITS: 100 INJECTION, SOLUTION INTRAVENOUS; SUBCUTANEOUS at 10:46

## 2017-12-18 RX ADMIN — INSULIN HUMAN 6 UNITS: 100 INJECTION, SOLUTION PARENTERAL at 08:48

## 2017-12-18 RX ADMIN — HYDROCODONE BITARTRATE AND ACETAMINOPHEN 1 TABLET: 7.5; 325 TABLET ORAL at 01:08

## 2017-12-18 RX ADMIN — NIFEDIPINE 30 MG: 30 TABLET, FILM COATED, EXTENDED RELEASE ORAL at 10:19

## 2017-12-18 RX ADMIN — LABETALOL HYDROCHLORIDE 20 MG: 5 INJECTION INTRAVENOUS at 05:16

## 2017-12-18 RX ADMIN — INSULIN DETEMIR 28 UNITS: 100 INJECTION, SOLUTION SUBCUTANEOUS at 08:53

## 2017-12-18 NOTE — PROGRESS NOTES
2017  PPD #3    Subjective   Emily feels well.  Patient describes her lochia less than menses.  Pain is well controlled  Her infant remains on the ventilator in the NICU and remains in serious condition  The patient had several significant elevations in blood pressure in the past 24 hours       Objective   Temp: Temp:  [97.7 °F (36.5 °C)-98.9 °F (37.2 °C)] 98.9 °F (37.2 °C) Temp src: Axillary   BP: BP: (135-180)/() 136/91        Pulse: Heart Rate:  [70-97] 75  RR: Resp:  [18] 18    General:  No acute distress   Abdomen: Fundus firm and beneath umbilicus, Pfannenstiel skin incision intact and healing well    Extremities: Edema rrace    Pelvis: deferred     Lab Results   Component Value Date    WBC 10.48 2017    HGB 8.6 (L) 2017    HCT 26.5 (L) 2017    MCV 81.5 2017     2017    HEPBSAG Negative 2017       Assessment  1. PPD# 3 after Primary  (LTCS)  2. Type 1 DM  3. History of DKA  4. History of non reassuring FHR tracing  5. Mild hypertension  Plan  1. Continue lisinopril  2. Add nifedipine 30 mg XL by mouth daily  3. Continue insulin therapy  4. Patient to go on pass to  NICU as her infant remains in serious condition  5. Probable discharged home 2017 once glucose control and blood pressure are improved  6. Repeat CBC and CMP today      This note has been electronically signed.    Satinder Lewis MD  2017

## 2017-12-18 NOTE — PLAN OF CARE
Problem: Postpartum ( Delivery) (Adult)  Goal: Signs and Symptoms of Listed Potential Problems Will be Absent or Manageable (Postpartum)  Outcome: Ongoing (interventions implemented as appropriate)    17 7723   Postpartum ( Delivery)   Problems Assessed (Postpartum ) all   Problems Present (Postpartum ) none

## 2017-12-18 NOTE — PROGRESS NOTES
Adult Nutrition  Assessment/PES    Patient Name:  Emily Bagley  YOB: 1995  MRN: [id number]  Admit Date:  12/15/2017    Assessment Date:  12/18/2017    Comments:  Insulin administration reviewed.  Meal time insulin held 12/17  after BS 57, today elevated BS.          Reason for Assessment       12/18/17 1736    Reason for Assessment    Reason For Assessment/Visit follow up protocol    Time Spent (min) 20    Diagnosis --   notes of admission                  Labs/Tests/Procedures/Meds       12/18/17 1729    Labs/Tests/Procedures/Meds    Labs/Tests Review Reviewed;Glucose    Medication Review Insulin   Levemir  q a.m., Humalog to cover high BS, Humulin R  for meal coverage                Nutrition Prescription Ordered       12/18/17 1728    Nutrition Prescription PO    Current PO Diet Regular    Common Modifiers Consistent Carbohydrate              Problem/Interventions:            Problem 3       12/18/17 1729    Nutrition Diagnoses Problem 3    Problem 3 Nutrition Appropriate for Condition at this Time    Etiology (related to) Medical Diagnosis    Endocrine DM Type 1                Intervention Goal       12/18/17 1731    Intervention Goal    General Improved nutrition related lab(s)    PO Maintain intake            Nutrition Intervention       12/18/17 1732    Nutrition Intervention    RD/Tech Action Care plan reviewd;Follow Tx progress   reviewed insulin RX and adminisatration with RN.  Pt unavailable.            Nutrition Prescription       12/18/17 1732    Nutrition Prescription PO    PO Prescription Begin/change diet    Common Modifiers Consistent Carbohydrate    Other Modifiers Gestational calorie level    Gestational Calorie Level Other (comment)   2400    New PO Prescription Ordered? No, recommended   Rec Calorie level order, information on CHO content on tray ticket.  Consistent CHO without calorie RX w/o that info            Education/Evaluation       12/18/17 1736    Monitor/Evaluation     Monitor Pertinent labs;PO intake        Electronically signed by:  Mallory Clemente RD  12/18/17 5:37 PM

## 2017-12-18 NOTE — PLAN OF CARE
Problem: Diabetes, Type 1 (Adult)  Goal: Signs and Symptoms of Listed Potential Problems Will be Absent or Manageable (Diabetes, Type 1)  Outcome: Ongoing (interventions implemented as appropriate)    12/18/17 5319   Diabetes, Type 1   Problems Assessed (Type 1 Diabetes) all;impaired glycemic control   Problems Present (Type 1 Diabetes) impaired glycemic control;situational response

## 2017-12-18 NOTE — PLAN OF CARE
Problem: Diabetes, Type 1 (Adult)  Goal: Signs and Symptoms of Listed Potential Problems Will be Absent or Manageable (Diabetes, Type 1)  Outcome: Ongoing (interventions implemented as appropriate)    12/18/17 1231   Diabetes, Type 1   Problems Assessed (Type 1 Diabetes) all   Problems Present (Type 1 Diabetes) impaired glycemic control

## 2017-12-19 VITALS
SYSTOLIC BLOOD PRESSURE: 135 MMHG | RESPIRATION RATE: 16 BRPM | DIASTOLIC BLOOD PRESSURE: 91 MMHG | WEIGHT: 203.5 LBS | BODY MASS INDEX: 31.94 KG/M2 | HEART RATE: 91 BPM | HEIGHT: 67 IN | OXYGEN SATURATION: 100 % | TEMPERATURE: 98.1 F

## 2017-12-19 LAB
GLUCOSE BLDC GLUCOMTR-MCNC: 146 MG/DL (ref 70–130)
GLUCOSE BLDC GLUCOMTR-MCNC: 156 MG/DL (ref 70–130)
GLUCOSE BLDC GLUCOMTR-MCNC: 161 MG/DL (ref 70–130)
GLUCOSE BLDC GLUCOMTR-MCNC: 332 MG/DL (ref 70–130)
GLUCOSE BLDC GLUCOMTR-MCNC: 338 MG/DL (ref 70–130)
GLUCOSE BLDC GLUCOMTR-MCNC: 353 MG/DL (ref 70–130)
GLUCOSE BLDC GLUCOMTR-MCNC: 394 MG/DL (ref 70–130)
GLUCOSE BLDC GLUCOMTR-MCNC: 418 MG/DL (ref 70–130)
GLUCOSE BLDC GLUCOMTR-MCNC: 423 MG/DL (ref 70–130)

## 2017-12-19 PROCEDURE — 82962 GLUCOSE BLOOD TEST: CPT

## 2017-12-19 PROCEDURE — 63710000001 INSULIN DETEMIR PER 5 UNITS: Performed by: OBSTETRICS & GYNECOLOGY

## 2017-12-19 PROCEDURE — 99238 HOSP IP/OBS DSCHRG MGMT 30/<: CPT | Performed by: OBSTETRICS & GYNECOLOGY

## 2017-12-19 RX ORDER — HYDROCODONE BITARTRATE AND ACETAMINOPHEN 7.5; 325 MG/1; MG/1
1 TABLET ORAL EVERY 4 HOURS PRN
Qty: 8 TABLET | Refills: 0 | Status: SHIPPED | OUTPATIENT
Start: 2017-12-19 | End: 2018-05-24

## 2017-12-19 RX ORDER — LISINOPRIL 10 MG/1
10 TABLET ORAL
Qty: 30 TABLET | Refills: 0 | Status: SHIPPED | OUTPATIENT
Start: 2017-12-19 | End: 2018-01-18

## 2017-12-19 RX ORDER — NIFEDIPINE 30 MG/1
30 TABLET, FILM COATED, EXTENDED RELEASE ORAL
Qty: 30 TABLET | Refills: 0 | Status: SHIPPED | OUTPATIENT
Start: 2017-12-19 | End: 2018-01-18

## 2017-12-19 RX ADMIN — INSULIN HUMAN 4 UNITS: 100 INJECTION, SOLUTION PARENTERAL at 08:54

## 2017-12-19 RX ADMIN — PRENATAL VIT W/ FE FUMARATE-FA TAB 27-0.8 MG 1 TABLET: 27-0.8 TAB at 09:03

## 2017-12-19 RX ADMIN — INSULIN DETEMIR 32 UNITS: 100 INJECTION, SOLUTION SUBCUTANEOUS at 09:03

## 2017-12-19 RX ADMIN — INSULIN LISPRO 4 UNITS: 100 INJECTION, SOLUTION INTRAVENOUS; SUBCUTANEOUS at 12:31

## 2017-12-19 RX ADMIN — HYDROCODONE BITARTRATE AND ACETAMINOPHEN 1 TABLET: 7.5; 325 TABLET ORAL at 13:50

## 2017-12-19 RX ADMIN — NIFEDIPINE 30 MG: 30 TABLET, FILM COATED, EXTENDED RELEASE ORAL at 10:11

## 2017-12-19 RX ADMIN — INSULIN LISPRO 10 UNITS: 100 INJECTION, SOLUTION INTRAVENOUS; SUBCUTANEOUS at 11:14

## 2017-12-19 RX ADMIN — LISINOPRIL 10 MG: 10 TABLET ORAL at 09:03

## 2017-12-19 RX ADMIN — HYDROCODONE BITARTRATE AND ACETAMINOPHEN 1 TABLET: 7.5; 325 TABLET ORAL at 02:08

## 2017-12-19 NOTE — DISCHARGE SUMMARY
Discharge Summary    Patient Name: Emily Bagley  : 1995  MRN: [id number]  Date of Service: 2017  Referring Provider: Jose Noel MD  Discharge Provider: Ben Whitaker MD    Date of Admission: 12/15/2017    Date of Discharge:  2017         Admission Diagnosis: DKA, type 1 [E10.10]     Discharge Diagnosis:DKA after delivery    Procedures:  , Low Transverse     12/15/2017    3:55 AM      Hospital Course:  Patient is a 22 y.o. female  status post primary  section, low transverse incision at 36w1d. Complications at time of delivery included non reassuring fetal status and DKA.  Following delivery at outside hospital, she was transferred to [facility] for management of her IDDM. Her baby was transferred to [facility]. She was initially maintained on an insulin drip. She was then transitioned to subcutaneous insulin injections. Her glucose levels on day of discharge were below 200.  She remained afebrile, with vital signs stable. She was ready for discharge on postpartum day 4.     Infant:   female  fetus 4775 g (10 lb 8.4 oz)  with Apgar scores of 0  , 5   at five minutes.    Discharge Condition: Stable    Discharge to: Home    Discharge Medications:    Emily Bagley   Home Medication Instructions GILLIAN:[id number]    Printed on:17 0832   Medication Information                      HYDROcodone-acetaminophen (NORCO) 7.5-325 MG per tablet  Take 1 tablet by mouth Every 4 (Four) Hours As Needed for Moderate Pain  for up to 8 doses.             insulin detemir (LEVEMIR) 100 UNIT/ML injection  Inject 32 Units under the skin Daily for 30 days.             insulin regular (humuLIN R,novoLIN R) 100 UNIT/ML injection  Inject 1 Units under the skin 3 (Three) Times a Day Before Meals for 30 doses.             lisinopril (PRINIVIL,ZESTRIL) 10 MG tablet  Take 1 tablet by mouth Daily for 30 doses.             NIFEdipine XL (ADALAT CC) 30 MG 24 hr tablet  Take 1 tablet by mouth Daily for 30  days.             Prenatal Vit-Fe Fumarate-FA (PRENATAL VITAMIN 27-0.8) 27-0.8 MG tablet tablet  Take 1 tablet by mouth Daily.                 Discharge Diet: Consistent carbohydrate diet 2000 kcal    Discharge Activity: No driving until no longer taking narcotics, Pelvic rest x 6 weeks (nothing in the vagina, no intercourse, tampons, douches) , No lifting >5 lbs for 6 weeks and No strenuous activity     Follow up appointments:Her OB in one week    Contraception: Will follow up with her primary OB in 1-2 weeks to further discuss short and long term options.  Ben Whitaker MD  12/19/2017 8:17 AM

## 2017-12-19 NOTE — NURSING NOTE
D/C instructions provided to patient regarding postpartum c section, informed patient to do the 1 unit per 5 carb ratio, consistent carb 2000 jae diet. Informed on when to come to hospital or MD office for BP symptoms. Informed patient that meds were sent to pharmacy and lortab rx was given to patient. Patient informed to call MD office today and schedule follow up for 1 week. Patient verbalized understanding.

## 2017-12-27 ENCOUNTER — APPOINTMENT (OUTPATIENT)
Dept: WOMENS IMAGING | Facility: HOSPITAL | Age: 22
End: 2017-12-27
Attending: OBSTETRICS & GYNECOLOGY

## 2018-02-06 ENCOUNTER — OFFICE VISIT (OUTPATIENT)
Dept: ENDOCRINOLOGY | Facility: CLINIC | Age: 23
End: 2018-02-06

## 2018-02-06 VITALS
WEIGHT: 166 LBS | HEART RATE: 80 BPM | SYSTOLIC BLOOD PRESSURE: 122 MMHG | DIASTOLIC BLOOD PRESSURE: 70 MMHG | BODY MASS INDEX: 26.68 KG/M2 | OXYGEN SATURATION: 99 % | HEIGHT: 66 IN

## 2018-02-06 DIAGNOSIS — O24.013 PRE-EXISTING TYPE 1 DIABETES MELLITUS DURING PREGNANCY IN THIRD TRIMESTER: ICD-10-CM

## 2018-02-06 DIAGNOSIS — IMO0001 UNCONTROLLED TYPE 1 DIABETES MELLITUS WITHOUT COMPLICATION: ICD-10-CM

## 2018-02-06 DIAGNOSIS — E10.9 TYPE 1 DIABETES MELLITUS WITHOUT COMPLICATION (HCC): Primary | ICD-10-CM

## 2018-02-06 LAB
ALBUMIN SERPL-MCNC: 4.3 G/DL (ref 3.2–4.8)
ALBUMIN/GLOB SERPL: 1.7 G/DL (ref 1.5–2.5)
ALP SERPL-CCNC: 146 U/L (ref 25–100)
ALT SERPL W P-5'-P-CCNC: 28 U/L (ref 7–40)
ANION GAP SERPL CALCULATED.3IONS-SCNC: 9 MMOL/L (ref 3–11)
AST SERPL-CCNC: 33 U/L (ref 0–33)
BILIRUB SERPL-MCNC: 0.2 MG/DL (ref 0.3–1.2)
BUN BLD-MCNC: 18 MG/DL (ref 9–23)
BUN/CREAT SERPL: 25.7 (ref 7–25)
CALCIUM SPEC-SCNC: 9.6 MG/DL (ref 8.7–10.4)
CHLORIDE SERPL-SCNC: 108 MMOL/L (ref 99–109)
CO2 SERPL-SCNC: 23 MMOL/L (ref 20–31)
CREAT BLD-MCNC: 0.7 MG/DL (ref 0.6–1.3)
GFR SERPL CREATININE-BSD FRML MDRD: 107 ML/MIN/1.73
GLOBULIN UR ELPH-MCNC: 2.6 GM/DL
GLUCOSE BLD-MCNC: 70 MG/DL (ref 70–100)
GLUCOSE BLDC GLUCOMTR-MCNC: 98 MG/DL (ref 70–130)
HBA1C MFR BLD: 9.4 %
POTASSIUM BLD-SCNC: 4.2 MMOL/L (ref 3.5–5.5)
PROT SERPL-MCNC: 6.9 G/DL (ref 5.7–8.2)
SODIUM BLD-SCNC: 140 MMOL/L (ref 132–146)
T4 FREE SERPL-MCNC: 1.24 NG/DL (ref 0.89–1.76)
TSH SERPL DL<=0.05 MIU/L-ACNC: 2.13 MIU/ML (ref 0.35–5.35)

## 2018-02-06 PROCEDURE — 83036 HEMOGLOBIN GLYCOSYLATED A1C: CPT | Performed by: INTERNAL MEDICINE

## 2018-02-06 PROCEDURE — 82947 ASSAY GLUCOSE BLOOD QUANT: CPT | Performed by: INTERNAL MEDICINE

## 2018-02-06 PROCEDURE — 99214 OFFICE O/P EST MOD 30 MIN: CPT | Performed by: INTERNAL MEDICINE

## 2018-02-06 PROCEDURE — 84443 ASSAY THYROID STIM HORMONE: CPT | Performed by: INTERNAL MEDICINE

## 2018-02-06 PROCEDURE — 80053 COMPREHEN METABOLIC PANEL: CPT | Performed by: INTERNAL MEDICINE

## 2018-02-06 PROCEDURE — 84439 ASSAY OF FREE THYROXINE: CPT | Performed by: INTERNAL MEDICINE

## 2018-02-06 PROCEDURE — 84681 ASSAY OF C-PEPTIDE: CPT | Performed by: INTERNAL MEDICINE

## 2018-02-06 NOTE — PROGRESS NOTES
"Emily Bagley 22 y.o.  CC:Follow-up and Diabetes (Type I, after delivery on 12-, birth weight was 10 lbs, 8 oz OB: Dr Rodriguez in [facility])      [place]: Follow-up and Diabetes (Type I, after delivery on 12-, birth weight was 10 lbs, 8 oz OB: Dr Rodriguez in [facility])    Delivered at 36 weeks  Baby 10 + lbs, was in nicu, now home with a feeding tube  She is still on levemir and humalog   Baby lost weight, switched formulas and now is gaining again    She did not have a low sugar   Had resp distress and had to be transported to    Sees pediatrician in [place]   Her sugars are \"horrible\"   Running 400 - 500   occ reads HI   Is testing 3 x a day  Lowest sugar is 7 pm   She is taking 35 u levemir (up from 28 units)   She has titrated levemir   She is interested in insulin pump therapy   Some blurry vision   Discussed titrating levemir.  Results for orders placed or performed in visit on 18   POC Glycosylated Hemoglobin (Hb A1C)   Result Value Ref Range    Hemoglobin A1C 9.4 %   POC Glucose Fingerstick   Result Value Ref Range    Glucose 98 70 - 130 mg/dL   she is utd with eye exam  No neuropathy   Ur alb due - is pp     No Known Allergies    Current Outpatient Prescriptions:   •  insulin detemir (LEVEMIR FLEXPEN) 100 UNIT/ML injection, Inject 35 Units under the skin Daily., Disp: , Rfl:   •  Insulin Lispro (HUMALOG KWIKPEN) 100 UNIT/ML solution pen-injector, Inject  under the skin. Inject 20 - 35 units TID with meals (1:5 carb ratio), Disp: , Rfl:   •  HYDROcodone-acetaminophen (NORCO) 7.5-325 MG per tablet, Take 1 tablet by mouth Every 4 (Four) Hours As Needed for Moderate Pain  for up to 8 doses., Disp: 8 tablet, Rfl: 0  •  Prenatal Vit-Fe Fumarate-FA (PRENATAL VITAMIN 27-0.8) 27-0.8 MG tablet tablet, Take 1 tablet by mouth Daily., Disp: , Rfl:   Patient Active Problem List    Diagnosis   • Nausea and vomiting during pregnancy [O21.9]   •  delivery delivered [O82]   • Nausea and vomiting in " pregnancy [O21.9]   • Diabetes mellitus in pregnancy [O24.919]   • DM (diabetes mellitus) in pregnancy [O24.919]   • Non-stress test reactive [Z36.89]   • Pregnancy [Z34.90]   • IDDM (insulin dependent diabetes mellitus) [E11.9, Z79.4]   • Endometritis [N71.9]   • PIH (pregnancy induced hypertension) [O13.9]   • DKA (diabetic ketoacidoses) [E13.10]   • Intrauterine fetal death in pregnancy, s/p induction, spontaneous vaginal delivery 10/9/16, Laborist Dr Juan [O36.4XX0]   • Lactic acidosis [E87.2]   • N&V (nausea and vomiting) [R11.2]   • Hypothyroid [E03.9]   • Pre-existing type 1 diabetes mellitus during pregnancy in third trimester [O24.013]   • Polyhydramnios [O40.9XX0]   • Chronic hypertension in pregnancy [O10.919]   • Hypertension [I10]     Overview Note:     With pregnancy     • Diabetes education, encounter for [Z71.89]     Overview Note:     Type 1       Review of Systems   Constitutional: Negative for activity change, appetite change, chills, diaphoresis, fatigue, fever and unexpected weight change.   HENT: Negative for congestion, dental problem, drooling, ear discharge, ear pain, facial swelling, hearing loss, mouth sores, nosebleeds, postnasal drip, rhinorrhea, sinus pressure, sneezing, sore throat, tinnitus, trouble swallowing and voice change.    Eyes: Negative for photophobia, pain, discharge, redness, itching and visual disturbance.   Respiratory: Negative for apnea, cough, choking, chest tightness, shortness of breath, wheezing and stridor.    Cardiovascular: Negative for chest pain, palpitations and leg swelling.   Gastrointestinal: Negative for abdominal distention, abdominal pain, anal bleeding, blood in stool, constipation, diarrhea, nausea, rectal pain and vomiting.   Endocrine: Positive for polydipsia. Negative for cold intolerance, heat intolerance, polyphagia and polyuria.   Genitourinary: Negative for decreased urine volume, difficulty urinating, dysuria, enuresis, flank pain,  "frequency, genital sores, hematuria and urgency.   Musculoskeletal: Negative for arthralgias, back pain, gait problem, joint swelling, myalgias, neck pain and neck stiffness.   Skin: Negative for color change, pallor, rash and wound.   Allergic/Immunologic: Negative for environmental allergies, food allergies and immunocompromised state.   Neurological: Negative for dizziness, tremors, seizures, syncope, facial asymmetry, speech difficulty, weakness, light-headedness, numbness and headaches.   Hematological: Negative for adenopathy. Does not bruise/bleed easily.   Psychiatric/Behavioral: Negative for agitation, behavioral problems, confusion, decreased concentration, dysphoric mood, hallucinations, self-injury, sleep disturbance and suicidal ideas. The patient is not nervous/anxious and is not hyperactive.      Social History     Social History   • Marital status: Single     Spouse name: N/A   • Number of children: N/A   • Years of education: N/A     Occupational History   • Not on file.     Social History Main Topics   • Smoking status: Never Smoker   • Smokeless tobacco: Never Used   • Alcohol use No   • Drug use: No   • Sexual activity: Yes     Partners: Male     Other Topics Concern   • Not on file     Social History Narrative     Family History   Problem Relation Age of Onset   • Hypertension Father    • Cancer Maternal Grandfather    • Cancer Paternal Grandmother    • Arthritis Paternal Grandfather    • Heart attack Paternal Grandfather    • Hypertension Paternal Grandfather      /70  Pulse 80  Ht 167.6 cm (66\")  Wt 75.3 kg (166 lb)  LMP 04/04/2017  SpO2 99%  Breastfeeding? No  BMI 26.79 kg/m2  Physical Exam   Constitutional: She is oriented to person, place, and time. She appears well-developed and well-nourished.   HENT:   Head: Normocephalic and atraumatic.   Nose: Nose normal.   Mouth/Throat: Oropharynx is clear and moist.   Eyes: Conjunctivae, EOM and lids are normal. Pupils are equal, round, " and reactive to light.   Neck: Trachea normal and normal range of motion. Neck supple. Carotid bruit is not present. No tracheal deviation present. No thyroid mass and no thyromegaly present.   Cardiovascular: Normal rate, regular rhythm, normal heart sounds and intact distal pulses.  Exam reveals no gallop and no friction rub.    No murmur heard.  Pulmonary/Chest: Effort normal and breath sounds normal. No respiratory distress. She has no wheezes.   Musculoskeletal: Normal range of motion. She exhibits no edema or deformity.    Emily had a diabetic foot exam performed today.   During the foot exam she had a monofilament test performed.    Neurological Sensory Findings - Unaltered hot/cold right ankle/foot discrimination and unaltered hot/cold left ankle/foot discrimination. Unaltered sharp/dull right ankle/foot discrimination and unaltered sharp/dull left ankle/foot discrimination. No right ankle/foot altered proprioception and no left ankle/foot altered proprioception    Vascular Status -  Her exam exhibits right foot vasculature normal. Her exam exhibits no right foot edema. Her exam exhibits left foot vasculature normal. Her exam exhibits no left foot edema.   Skin Integrity  -  Her right foot skin is intact.     Emily 's left foot skin is intact. .  Lymphadenopathy:     She has no cervical adenopathy.   Neurological: She is alert and oriented to person, place, and time. She has normal reflexes. She displays normal reflexes. No cranial nerve deficit.   Skin: Skin is warm and dry. No rash noted. No cyanosis or erythema. Nails show no clubbing.   Psychiatric: She has a normal mood and affect. Her speech is normal and behavior is normal. Judgment and thought content normal. Cognition and memory are normal.   Nursing note and vitals reviewed.    Results for orders placed or performed during the hospital encounter of 12/15/17   Urine Culture - Urine, Urine, Clean Catch   Result Value Ref Range    Urine Culture No  growth at 2 days    Lactic Acid, Reflex   Result Value Ref Range    Lactate 0.9 0.5 - 2.0 mmol/L   Hemoglobin A1c   Result Value Ref Range    Hemoglobin A1C 8.90 (H) 4.80 - 5.60 %   Comprehensive Metabolic Panel   Result Value Ref Range    Glucose 76 70 - 100 mg/dL    BUN 19 9 - 23 mg/dL    Creatinine 0.90 0.60 - 1.30 mg/dL    Sodium 140 132 - 146 mmol/L    Potassium 3.9 3.5 - 5.5 mmol/L    Chloride 109 99 - 109 mmol/L    CO2 11.0 (L) 20.0 - 31.0 mmol/L    Calcium 8.9 8.7 - 10.4 mg/dL    Total Protein 5.4 (L) 5.7 - 8.2 g/dL    Albumin 3.00 (L) 3.20 - 4.80 g/dL    ALT (SGPT) 11 7 - 40 U/L    AST (SGOT) 19 0 - 33 U/L    Alkaline Phosphatase 97 25 - 100 U/L    Total Bilirubin 0.3 0.3 - 1.2 mg/dL    eGFR Non African Amer 80 >60 mL/min/1.73    Globulin 2.4 gm/dL    A/G Ratio 1.3 (L) 1.5 - 2.5 g/dL    BUN/Creatinine Ratio 21.1 7.0 - 25.0    Anion Gap 20.0 (H) 3.0 - 11.0 mmol/L   Urinalysis With / Culture If Indicated - Urine, Clean Catch   Result Value Ref Range    Color, UA Yellow Yellow, Straw    Appearance, UA Cloudy (A) Clear    pH, UA 6.0 5.0 - 8.0    Specific Gravity, UA 1.020 1.001 - 1.030    Glucose,  mg/dL (Trace) (A) Negative    Ketones, UA >=160 mg/dL (4+) (A) Negative    Bilirubin, UA Moderate (2+) (A) Negative    Blood, UA Moderate (2+) (A) Negative    Protein,  mg/dL (2+) (A) Negative    Leuk Esterase, UA Negative Negative    Nitrite, UA Negative Negative    Urobilinogen, UA 0.2 E.U./dL 0.2 - 1.0 E.U./dL   Calcium, Ionized   Result Value Ref Range    Ionized Calcium 1.29 1.12 - 1.32 mmol/L   Urinalysis, Microscopic Only - Urine, Clean Catch   Result Value Ref Range    RBC, UA 7-12 (A) None Seen, 0-2 /HPF    WBC, UA 21-30 (A) None Seen /HPF    Bacteria, UA None Seen None Seen, Trace /HPF    Squamous Epithelial Cells, UA 3-6 (A) None Seen, 0-2 /HPF    Hyaline Casts, UA 0-6 0 - 6 /LPF    Methodology Manual Light Microscopy    Phosphorus   Result Value Ref Range    Phosphorus 3.5 2.4 - 5.1 mg/dL    Basic Metabolic Panel   Result Value Ref Range    Glucose 292 (H) 70 - 100 mg/dL    BUN 15 9 - 23 mg/dL    Creatinine 0.90 0.60 - 1.30 mg/dL    Sodium 135 132 - 146 mmol/L    Potassium 4.3 3.5 - 5.5 mmol/L    Chloride 106 99 - 109 mmol/L    CO2 11.0 (L) 20.0 - 31.0 mmol/L    Calcium 8.4 (L) 8.7 - 10.4 mg/dL    eGFR Non African Amer 80 >60 mL/min/1.73    BUN/Creatinine Ratio 16.7 7.0 - 25.0    Anion Gap 18.0 (H) 3.0 - 11.0 mmol/L   Magnesium   Result Value Ref Range    Magnesium 1.2 (L) 1.3 - 2.7 mg/dL   Electrolyte Panel   Result Value Ref Range    Sodium 134 132 - 146 mmol/L    Potassium 4.4 3.5 - 5.5 mmol/L    Chloride 111 (H) 99 - 109 mmol/L    CO2 11.0 (L) 20.0 - 31.0 mmol/L    Anion Gap 12.0 (H) 3.0 - 11.0 mmol/L   Urinalysis - Urine, Catheter   Result Value Ref Range    Color, UA Yellow Yellow, Straw    Appearance, UA Clear Clear    pH, UA 5.5 5.0 - 8.0    Specific Gravity, UA 1.018 1.001 - 1.030    Glucose,  mg/dL (Trace) (A) Negative    Ketones, UA >=160 mg/dL (4+) (A) Negative    Bilirubin, UA Moderate (2+) (A) Negative    Blood, UA Moderate (2+) (A) Negative    Protein, UA Trace (A) Negative    Leuk Esterase, UA Negative Negative    Nitrite, UA Negative Negative    Urobilinogen, UA 0.2 E.U./dL 0.2 - 1.0 E.U./dL   Phosphorus   Result Value Ref Range    Phosphorus 2.8 2.4 - 5.1 mg/dL   Basic Metabolic Panel   Result Value Ref Range    Glucose 180 (H) 70 - 100 mg/dL    BUN 15 9 - 23 mg/dL    Creatinine 0.80 0.60 - 1.30 mg/dL    Sodium 132 132 - 146 mmol/L    Potassium 4.2 3.5 - 5.5 mmol/L    Chloride 111 (H) 99 - 109 mmol/L    CO2 16.0 (L) 20.0 - 31.0 mmol/L    Calcium 8.6 (L) 8.7 - 10.4 mg/dL    eGFR Non African Amer 91 >60 mL/min/1.73    BUN/Creatinine Ratio 18.8 7.0 - 25.0    Anion Gap 5.0 3.0 - 11.0 mmol/L   Calcium, Ionized   Result Value Ref Range    Ionized Calcium 1.33 (H) 1.12 - 1.32 mmol/L   Urinalysis - Urine, Catheter   Result Value Ref Range    Color, UA Yellow Yellow, Straw     Appearance, UA Clear Clear    pH, UA 5.5 5.0 - 8.0    Specific Gravity, UA 1.021 1.001 - 1.030    Glucose, UA >=1000 mg/dL (3+) (A) Negative    Ketones, UA 80 mg/dL (3+) (A) Negative    Bilirubin, UA Large (3+) (A) Negative    Blood, UA Small (1+) (A) Negative    Protein, UA Trace (A) Negative    Leuk Esterase, UA Negative Negative    Nitrite, UA Negative Negative    Urobilinogen, UA 0.2 E.U./dL 0.2 - 1.0 E.U./dL   CBC (No Diff)   Result Value Ref Range    WBC 10.48 4.50 - 13.50 10*3/mm3    RBC 3.25 (L) 3.89 - 5.14 10*6/mm3    Hemoglobin 8.6 (L) 11.5 - 15.5 g/dL    Hematocrit 26.5 (L) 34.5 - 44.0 %    MCV 81.5 80.0 - 99.0 fL    MCH 26.5 (L) 27.0 - 31.0 pg    MCHC 32.5 32.0 - 36.0 g/dL    RDW 15.1 (H) 11.3 - 14.5 %    RDW-SD 44.7 37.0 - 54.0 fl    MPV 12.4 (H) 6.0 - 12.0 fL    Platelets 150 150 - 450 10*3/mm3   Comprehensive Metabolic Panel   Result Value Ref Range    Glucose 172 (H) 70 - 100 mg/dL    BUN 15 9 - 23 mg/dL    Creatinine 0.60 0.60 - 1.30 mg/dL    Sodium 130 (L) 132 - 146 mmol/L    Potassium 4.0 3.5 - 5.5 mmol/L    Chloride 106 99 - 109 mmol/L    CO2 18.0 (L) 20.0 - 31.0 mmol/L    Calcium 8.0 (L) 8.7 - 10.4 mg/dL    Total Protein 4.2 (L) 5.7 - 8.2 g/dL    Albumin 2.30 (L) 3.20 - 4.80 g/dL    ALT (SGPT) 9 7 - 40 U/L    AST (SGOT) 21 0 - 33 U/L    Alkaline Phosphatase 87 25 - 100 U/L    Total Bilirubin 0.3 0.3 - 1.2 mg/dL    eGFR Non African Amer 127 >60 mL/min/1.73    Globulin 1.9 gm/dL    A/G Ratio 1.2 (L) 1.5 - 2.5 g/dL    BUN/Creatinine Ratio 25.0 7.0 - 25.0    Anion Gap 6.0 3.0 - 11.0 mmol/L   Magnesium   Result Value Ref Range    Magnesium 1.6 1.3 - 2.7 mg/dL   Urinalysis - Urine, Catheter   Result Value Ref Range    Color, UA Yellow Yellow, Straw    Appearance, UA Clear Clear    pH, UA 5.5 5.0 - 8.0    Specific Gravity, UA 1.022 1.001 - 1.030    Glucose,  mg/dL (Trace) (A) Negative    Ketones, UA Trace (A) Negative    Bilirubin, UA Small (1+) (A) Negative    Blood, UA Negative Negative     Protein, UA Negative Negative    Leuk Esterase, UA Negative Negative    Nitrite, UA Negative Negative    Urobilinogen, UA 0.2 E.U./dL 0.2 - 1.0 E.U./dL   CBC (No Diff)   Result Value Ref Range    WBC 7.70 4.50 - 13.50 10*3/mm3    RBC 3.59 (L) 3.89 - 5.14 10*6/mm3    Hemoglobin 9.3 (L) 11.5 - 15.5 g/dL    Hematocrit 30.1 (L) 34.5 - 44.0 %    MCV 83.8 80.0 - 99.0 fL    MCH 25.9 (L) 27.0 - 31.0 pg    MCHC 30.9 (L) 32.0 - 36.0 g/dL    RDW 15.5 (H) 11.3 - 14.5 %    RDW-SD 46.6 37.0 - 54.0 fl    MPV 12.9 (H) 6.0 - 12.0 fL    Platelets 162 150 - 450 10*3/mm3   Preeclampsia Panel   Result Value Ref Range    Alkaline Phosphatase 99 25 - 100 U/L    ALT (SGPT) 12 7 - 40 U/L    AST (SGOT) 20 0 - 33 U/L    Creatinine 0.60 0.60 - 1.30 mg/dL    Total Bilirubin 0.3 0.3 - 1.2 mg/dL     120 - 246 U/L    Uric Acid 4.9 3.1 - 7.8 mg/dL   Glucose, Random   Result Value Ref Range    Glucose 423 (C) 70 - 100 mg/dL   POC Glucose Once   Result Value Ref Range    Glucose 76 70 - 130 mg/dL   POC Glucose Once   Result Value Ref Range    Glucose 158 (H) 70 - 130 mg/dL   POC Glucose Once   Result Value Ref Range    Glucose 161 (H) 70 - 130 mg/dL   POC Glucose Once   Result Value Ref Range    Glucose 66 (L) 70 - 130 mg/dL   POC Glucose Once   Result Value Ref Range    Glucose 207 (H) 70 - 130 mg/dL   POC Glucose Once   Result Value Ref Range    Glucose 241 (H) 70 - 130 mg/dL   POC Glucose Once   Result Value Ref Range    Glucose 333 (H) 70 - 130 mg/dL   POC Glucose Once   Result Value Ref Range    Glucose 344 (H) 70 - 130 mg/dL   POC Glucose Once   Result Value Ref Range    Glucose 240 (H) 70 - 130 mg/dL   POC Glucose Once   Result Value Ref Range    Glucose 181 (H) 70 - 130 mg/dL   POC Glucose Once   Result Value Ref Range    Glucose 125 70 - 130 mg/dL   POC Glucose Once   Result Value Ref Range    Glucose 61 (L) 70 - 130 mg/dL   POC Glucose Once   Result Value Ref Range    Glucose 156 (H) 70 - 130 mg/dL   POC Glucose Once   Result Value  Ref Range    Glucose 167 (H) 70 - 130 mg/dL   POC Glucose Once   Result Value Ref Range    Glucose 199 (H) 70 - 130 mg/dL   POC Glucose Once   Result Value Ref Range    Glucose 208 (H) 70 - 130 mg/dL   POC Glucose Once   Result Value Ref Range    Glucose 167 (H) 70 - 130 mg/dL   POC Glucose Once   Result Value Ref Range    Glucose 109 70 - 130 mg/dL   POC Glucose Once   Result Value Ref Range    Glucose 44 (C) 70 - 130 mg/dL   POC Glucose Once   Result Value Ref Range    Glucose 44 (C) 70 - 130 mg/dL   POC Glucose Once   Result Value Ref Range    Glucose 87 70 - 130 mg/dL   POC Glucose Once   Result Value Ref Range    Glucose 114 70 - 130 mg/dL   POC Glucose Once   Result Value Ref Range    Glucose 138 (H) 70 - 130 mg/dL   POC Glucose Once   Result Value Ref Range    Glucose 171 (H) 70 - 130 mg/dL   POC Glucose Once   Result Value Ref Range    Glucose 180 (H) 70 - 130 mg/dL   POC Glucose Once   Result Value Ref Range    Glucose 181 (H) 70 - 130 mg/dL   POC Glucose Once   Result Value Ref Range    Glucose 172 (H) 70 - 130 mg/dL   POC Glucose Once   Result Value Ref Range    Glucose 194 (H) 70 - 130 mg/dL   POC Glucose Once   Result Value Ref Range    Glucose 219 (H) 70 - 130 mg/dL   POC Glucose Once   Result Value Ref Range    Glucose 70 70 - 130 mg/dL   POC Glucose Once   Result Value Ref Range    Glucose 76 70 - 130 mg/dL   POC Glucose Once   Result Value Ref Range    Glucose 86 70 - 130 mg/dL   POC Glucose Once   Result Value Ref Range    Glucose 106 70 - 130 mg/dL   POC Glucose Once   Result Value Ref Range    Glucose 142 (H) 70 - 130 mg/dL   POC Glucose Once   Result Value Ref Range    Glucose 57 (L) 70 - 130 mg/dL   POC Glucose Once   Result Value Ref Range    Glucose 71 70 - 130 mg/dL   POC Glucose Once   Result Value Ref Range    Glucose 78 70 - 130 mg/dL   POC Glucose Once   Result Value Ref Range    Glucose 144 (H) 70 - 130 mg/dL   POC Glucose Once   Result Value Ref Range    Glucose 427 (H) 70 - 130 mg/dL    POC Glucose Once   Result Value Ref Range    Glucose 410 (H) 70 - 130 mg/dL   POC Glucose Once   Result Value Ref Range    Glucose 478 (C) 70 - 130 mg/dL   POC Glucose Once   Result Value Ref Range    Glucose 308 (H) 70 - 130 mg/dL   POC Glucose Once   Result Value Ref Range    Glucose 203 (H) 70 - 130 mg/dL   POC Glucose Once   Result Value Ref Range    Glucose 109 70 - 130 mg/dL   POC Glucose Once   Result Value Ref Range    Glucose 133 (H) 70 - 130 mg/dL   POC Glucose Once   Result Value Ref Range    Glucose 161 (H) 70 - 130 mg/dL   POC Glucose Once   Result Value Ref Range    Glucose 156 (H) 70 - 130 mg/dL   POC Glucose Once   Result Value Ref Range    Glucose 394 (H) 70 - 130 mg/dL   POC Glucose Once   Result Value Ref Range    Glucose 353 (H) 70 - 130 mg/dL   POC Glucose Once   Result Value Ref Range    Glucose 423 (H) 70 - 130 mg/dL   POC Glucose Once   Result Value Ref Range    Glucose 418 (H) 70 - 130 mg/dL   POC Glucose Once   Result Value Ref Range    Glucose 338 (H) 70 - 130 mg/dL   POC Glucose Once   Result Value Ref Range    Glucose 332 (H) 70 - 130 mg/dL   POC Glucose Once   Result Value Ref Range    Glucose 146 (H) 70 - 130 mg/dL   Type & Screen   Result Value Ref Range    ABO Type O     RH type Negative     Antibody Screen Positive    Antibody Identification   Result Value Ref Range    Anti-D, Passive PASSIVE D ANTIBODY, PATIENT RECEIVED RHIG    Postpartum RhIg Evaluation   Result Value Ref Range    ABO Type O     RH type Negative    Fetal Bleed Screen   Result Value Ref Range    Fetal Bleed Screen Negative    Doses of Rh Immune Globulin   Result Value Ref Range    Number of Doses Fetal Screen Negative - Recommend 1 vial of RhIg      Emily was seen today for follow-up and diabetes.    Diagnoses and all orders for this visit:    Type 1 diabetes mellitus without complication  -     POC Glycosylated Hemoglobin (Hb A1C)  -     POC Glucose Fingerstick      Problem List Items Addressed This Visit         Endocrine    Uncontrolled type 1 diabetes mellitus without complication     Blood sugar and 90 day average sugar reviewed  Results for orders placed or performed in visit on 02/06/18   POC Glycosylated Hemoglobin (Hb A1C)   Result Value Ref Range    Hemoglobin A1C 9.4 %   POC Glucose Fingerstick   Result Value Ref Range    Glucose 98 70 - 130 mg/dL     Discussed need to test ac and hs and give insulin before meals - insulin to carb 1:7  Also discussed increasing levemir by 2 units every day until fasting sugar is in good range (120 or less)  We discussed insulin pump and pump/ sensor options for over 30 min with comparison and contrast of the options available and at this time she would prefer a tubing free pump (due to infant).  She will look at information provided and online ratings and will give us a final decision by email so that we can proceed with PA etc  c peptide done today   F/u 3 months   She is testing sugars 3 times a day, is using sugar to adjust insulin dose  Biggest barrier to improved control is basal insulin          Relevant Medications    insulin detemir (LEVEMIR FLEXPEN) 100 UNIT/ML injection    Insulin Lispro (HUMALOG KWIKPEN) 100 UNIT/ML solution pen-injector    RESOLVED: Pre-existing type 1 diabetes mellitus during pregnancy in third trimester    Relevant Medications    insulin detemir (LEVEMIR FLEXPEN) 100 UNIT/ML injection    Insulin Lispro (HUMALOG KWIKPEN) 100 UNIT/ML solution pen-injector      Other Visit Diagnoses     Type 1 diabetes mellitus without complication    -  Primary    Relevant Medications    insulin detemir (LEVEMIR FLEXPEN) 100 UNIT/ML injection    Insulin Lispro (HUMALOG KWIKPEN) 100 UNIT/ML solution pen-injector    Other Relevant Orders    POC Glycosylated Hemoglobin (Hb A1C) (Completed)    POC Glucose Fingerstick (Completed)    TSH    T4, Free    Comprehensive Metabolic Panel    C-Peptide        Return in about 3 months (around 5/6/2018) for Recheck 30 min  .    Crystal Reynolds MA  Signed Ne Kerr MD

## 2018-02-06 NOTE — ASSESSMENT & PLAN NOTE
Blood sugar and 90 day average sugar reviewed  Results for orders placed or performed in visit on 02/06/18   POC Glycosylated Hemoglobin (Hb A1C)   Result Value Ref Range    Hemoglobin A1C 9.4 %   POC Glucose Fingerstick   Result Value Ref Range    Glucose 98 70 - 130 mg/dL     Discussed need to test ac and hs and give insulin before meals - insulin to carb 1:7  Also discussed increasing levemir by 2 units every day until fasting sugar is in good range (120 or less)  We discussed insulin pump and pump/ sensor options for over 30 min with comparison and contrast of the options available and at this time she would prefer a tubing free pump (due to infant).  She will look at information provided and online ratings and will give us a final decision by email so that we can proceed with PA etc  c peptide done today   F/u 3 months   She is testing sugars 3 times a day, is using sugar to adjust insulin dose  Biggest barrier to improved control is basal insulin

## 2018-02-07 LAB — C PEPTIDE SERPL-MCNC: <0.1 NG/ML (ref 1.1–4.4)

## 2018-03-02 ENCOUNTER — TELEPHONE (OUTPATIENT)
Dept: ENDOCRINOLOGY | Facility: CLINIC | Age: 23
End: 2018-03-02

## 2018-03-02 NOTE — TELEPHONE ENCOUNTER
Notification received from [facility] [employer] advising they denied coverage for an insulin pump and supplies.  Patient needs to test and document blood sugars QID and follow up with Dr Kerr on a regular basis.  Letter was sent to patient as well

## 2018-04-30 RX ORDER — INSULIN GLARGINE 100 [IU]/ML
INJECTION, SOLUTION SUBCUTANEOUS
Qty: 1 EACH | Refills: 0 | OUTPATIENT
Start: 2018-04-30 | End: 2018-05-24 | Stop reason: ALTCHOICE

## 2018-04-30 NOTE — TELEPHONE ENCOUNTER
PA request received for lantus solo star pens   Request faxed back to [facility] to change to lantus vials with same instructions

## 2018-04-30 NOTE — TELEPHONE ENCOUNTER
PA request received for humalog kwikpens.  Tiffanie at pharmacy tried novolog pens which were denied and then tried humalog vials which was covered.  rx given to change to humalog vials and insulin syringes

## 2018-05-24 ENCOUNTER — OFFICE VISIT (OUTPATIENT)
Dept: ENDOCRINOLOGY | Facility: CLINIC | Age: 23
End: 2018-05-24

## 2018-05-24 VITALS
SYSTOLIC BLOOD PRESSURE: 110 MMHG | HEART RATE: 121 BPM | BODY MASS INDEX: 23.39 KG/M2 | DIASTOLIC BLOOD PRESSURE: 62 MMHG | OXYGEN SATURATION: 100 % | WEIGHT: 149 LBS | HEIGHT: 67 IN

## 2018-05-24 DIAGNOSIS — E10.9 TYPE 1 DIABETES MELLITUS WITHOUT COMPLICATION (HCC): ICD-10-CM

## 2018-05-24 DIAGNOSIS — IMO0001 UNCONTROLLED TYPE 1 DIABETES MELLITUS WITHOUT COMPLICATION: Primary | ICD-10-CM

## 2018-05-24 LAB
GLUCOSE BLDC GLUCOMTR-MCNC: 197 MG/DL (ref 70–130)
HBA1C MFR BLD: 10.9 %

## 2018-05-24 PROCEDURE — 82947 ASSAY GLUCOSE BLOOD QUANT: CPT | Performed by: INTERNAL MEDICINE

## 2018-05-24 PROCEDURE — 83036 HEMOGLOBIN GLYCOSYLATED A1C: CPT | Performed by: INTERNAL MEDICINE

## 2018-05-24 PROCEDURE — 99214 OFFICE O/P EST MOD 30 MIN: CPT | Performed by: INTERNAL MEDICINE

## 2018-05-24 RX ORDER — INSULIN GLARGINE 100 [IU]/ML
INJECTION, SOLUTION SUBCUTANEOUS
COMMUNITY
Start: 2017-10-03 | End: 2019-04-25 | Stop reason: ALTCHOICE

## 2018-05-24 RX ORDER — SYRINGE AND NEEDLE,INSULIN,1ML 29 G X1/2"
SYRINGE, EMPTY DISPOSABLE MISCELLANEOUS
COMMUNITY
Start: 2018-04-30

## 2018-05-24 RX ORDER — INSULIN GLARGINE 100 [IU]/ML
45 INJECTION, SOLUTION SUBCUTANEOUS DAILY
COMMUNITY
End: 2020-02-24

## 2018-05-24 NOTE — ASSESSMENT & PLAN NOTE
Blood sugar and 90 day average sugar reviewed  Results for orders placed or performed in visit on 05/24/18   POC Glycosylated Hemoglobin (Hb A1C)   Result Value Ref Range    Hemoglobin A1C 10.9 %   POC Glucose Fingerstick   Result Value Ref Range    Glucose 197 (A) 70 - 130 mg/dL     She has been out of insulin for 2 weeks  Called pharmacy and it is ready for    Patient notified, samples provided   She does not bring blood sugars with her   Asked her to restart testing and document sugar, insulin dose with meal  1 u per 7 carbs and 1 unit lowers sugar 20 points  Asked her to update eye exam  No neuropathy  Ur alb borderline pos 12/17 (24 hour urine during pregnancy)  Repeat at next ov  F/u 4 weeks with records- grids provided  If intensifying insulin at that point will resubmit paperwork for pump therapy

## 2018-05-24 NOTE — PROGRESS NOTES
Emily Kevyn 23 y.o.  CC:Follow-up and Diabetes (Type I, eye exam was 2 years ago, pt has been out of insulin x 2 weeks)      [place]: Follow-up and Diabetes (Type I, eye exam was 2 years ago, pt has been out of insulin x 2 weeks)    Has not had insulin for 2 weeks   Crystal JOSEPH) spoke to pharmacy- they state basaglar is what is approved and verbal order was provided for this (45 units once daily)  They do have humalog vials ready for her as well- she was not aware these were ready to be picked up   Doing well otherwise   Results for orders placed or performed in visit on 05/24/18   POC Glycosylated Hemoglobin (Hb A1C)   Result Value Ref Range    Hemoglobin A1C 10.9 %   POC Glucose Fingerstick   Result Value Ref Range    Glucose 197 (A) 70 - 130 mg/dL   discussed average sugar 270- goal 140 or less   Reviewed risk of higher sugars with her - discussed damage to eye, kidney and nerves in feet  She is considering insulin pump and have asked her to intensify insulin regimen and document fsbs ab and hs, dose of insulin prior to meal (based on tdd insulin 80 u - 1 unit covers 7 carbs and lowers sugar by 20 points)    She Is overdue for eye exam - reminded her about this   She was testing 3-4 times a day before she ran out of insulin   Baby doing well   Insurance would not cover insulin pump due to not testing and not intensifying regimen   Options for pumps discussed and reviewed options for pump, sensors and combinations   No burning or pain in feet   Ur alb marginally high 4/18     No Known Allergies    Current Outpatient Prescriptions:   •  glucose blood test strip, Use to test sugar before meals and at bedtime 4 times a day, Disp: 150 each, Rfl: 5  •  HUMALOG 100 UNIT/ML injection, Inject 25-30 units TID with meals (1:5 carb ratio), Disp: 10 mL, Rfl: 1  •  insulin glargine (LANTUS) 100 UNIT/ML injection, Inject 25 units BID (Patient taking differently: 45 Units. Inject 25 units BID), Disp: 1 each, Rfl: 0  Patient Active  Problem List    Diagnosis   • Uncontrolled type 1 diabetes mellitus without complication [E10.65]   • Nausea and vomiting during pregnancy [O21.9]   •  delivery delivered [O82]   • Nausea and vomiting in pregnancy [O21.9]   • Diabetes mellitus in pregnancy [O24.919]   • DM (diabetes mellitus) in pregnancy [O24.919]   • Non-stress test reactive [Z36.89]   • Pregnancy [Z34.90]   • IDDM (insulin dependent diabetes mellitus) [E11.9, Z79.4]   • Endometritis [N71.9]   • PIH (pregnancy induced hypertension) [O13.9]   • DKA (diabetic ketoacidoses) [E13.10]   • Intrauterine fetal death in pregnancy, s/p induction, spontaneous vaginal delivery 10/9/16, Laborist Dr Juan [O36.4XX0]   • Lactic acidosis [E87.2]   • N&V (nausea and vomiting) [R11.2]   • Hypothyroid [E03.9]   • Polyhydramnios [O40.9XX0]   • Chronic hypertension in pregnancy [O10.919]   • Hypertension [I10]     Overview Note:     With pregnancy     • Diabetes education, encounter for [Z71.89]     Overview Note:     Type 1       Review of Systems   Constitutional: Negative for activity change, appetite change, chills, diaphoresis, fatigue, fever and unexpected weight change.   HENT: Negative for congestion, dental problem, drooling, ear discharge, ear pain, facial swelling, hearing loss, mouth sores, nosebleeds, postnasal drip, rhinorrhea, sinus pressure, sneezing, sore throat, tinnitus, trouble swallowing and voice change.    Eyes: Negative for photophobia, pain, discharge, redness, itching and visual disturbance.   Respiratory: Negative for apnea, cough, choking, chest tightness, shortness of breath, wheezing and stridor.    Cardiovascular: Negative for chest pain, palpitations and leg swelling.   Gastrointestinal: Negative for abdominal distention, abdominal pain, anal bleeding, blood in stool, constipation, diarrhea, nausea, rectal pain and vomiting.   Endocrine: Negative for cold intolerance, heat intolerance, polydipsia, polyphagia and polyuria.  "  Genitourinary: Negative for decreased urine volume, difficulty urinating, dysuria, enuresis, flank pain, frequency, genital sores, hematuria and urgency.   Musculoskeletal: Negative for arthralgias, back pain, gait problem, joint swelling, myalgias, neck pain and neck stiffness.   Skin: Negative for color change, pallor, rash and wound.   Allergic/Immunologic: Negative for environmental allergies, food allergies and immunocompromised state.   Neurological: Negative for dizziness, tremors, seizures, syncope, facial asymmetry, speech difficulty, weakness, light-headedness, numbness and headaches.   Hematological: Negative for adenopathy. Does not bruise/bleed easily.   Psychiatric/Behavioral: Negative for agitation, behavioral problems, confusion, decreased concentration, dysphoric mood, hallucinations, self-injury, sleep disturbance and suicidal ideas. The patient is not nervous/anxious and is not hyperactive.      Social History     Social History   • Marital status: Single     Spouse name: N/A   • Number of children: N/A   • Years of education: N/A     Occupational History   • Not on file.     Social History Main Topics   • Smoking status: Never Smoker   • Smokeless tobacco: Never Used   • Alcohol use No   • Drug use: No   • Sexual activity: Yes     Partners: Male     Other Topics Concern   • Not on file     Social History Narrative   • No narrative on file     Family History   Problem Relation Age of Onset   • Hypertension Father    • Cancer Maternal Grandfather    • Cancer Paternal Grandmother    • Arthritis Paternal Grandfather    • Heart attack Paternal Grandfather    • Hypertension Paternal Grandfather      /62   Pulse (!) 121   Ht 170.2 cm (67\")   Wt 67.6 kg (149 lb)   LMP 04/04/2017   SpO2 100%   BMI 23.34 kg/m²   Physical Exam   Constitutional: She is oriented to person, place, and time. She appears well-developed and well-nourished.   HENT:   Head: Normocephalic and atraumatic.   Nose: Nose " normal.   Mouth/Throat: Oropharynx is clear and moist.   Eyes: Conjunctivae, EOM and lids are normal. Pupils are equal, round, and reactive to light.   Neck: Trachea normal and normal range of motion. Neck supple. Carotid bruit is not present. No tracheal deviation present. No thyroid mass and no thyromegaly present.   Cardiovascular: Normal rate, regular rhythm, normal heart sounds and intact distal pulses.  Exam reveals no gallop and no friction rub.    No murmur heard.  Pulmonary/Chest: Effort normal and breath sounds normal. No respiratory distress. She has no wheezes.   Musculoskeletal: Normal range of motion. She exhibits no edema or deformity.    Emily had a diabetic foot exam performed today.   During the foot exam she had a monofilament test performed.    Neurological Sensory Findings - Unaltered hot/cold right ankle/foot discrimination and unaltered hot/cold left ankle/foot discrimination. Unaltered sharp/dull right ankle/foot discrimination and unaltered sharp/dull left ankle/foot discrimination. No right ankle/foot altered proprioception and no left ankle/foot altered proprioception  Vascular Status -  Her right foot exhibits normal foot vasculature  and no edema. Her left foot exhibits normal foot vasculature  and no edema.  Skin Integrity  -  Her right foot skin is intact.Her left foot skin is intact..  Lymphadenopathy:     She has no cervical adenopathy.   Neurological: She is alert and oriented to person, place, and time. She has normal reflexes. She displays normal reflexes. No cranial nerve deficit.   Skin: Skin is warm and dry. No rash noted. No cyanosis or erythema. Nails show no clubbing.   Psychiatric: She has a normal mood and affect. Her speech is normal and behavior is normal. Judgment and thought content normal. Cognition and memory are normal.   Nursing note and vitals reviewed.    Results for orders placed or performed in visit on 02/06/18   TSH   Result Value Ref Range    TSH 2.128  0.350 - 5.350 mIU/mL   T4, Free   Result Value Ref Range    Free T4 1.24 0.89 - 1.76 ng/dL   Comprehensive Metabolic Panel   Result Value Ref Range    Glucose 70 70 - 100 mg/dL    BUN 18 9 - 23 mg/dL    Creatinine 0.70 0.60 - 1.30 mg/dL    Sodium 140 132 - 146 mmol/L    Potassium 4.2 3.5 - 5.5 mmol/L    Chloride 108 99 - 109 mmol/L    CO2 23.0 20.0 - 31.0 mmol/L    Calcium 9.6 8.7 - 10.4 mg/dL    Total Protein 6.9 5.7 - 8.2 g/dL    Albumin 4.30 3.20 - 4.80 g/dL    ALT (SGPT) 28 7 - 40 U/L    AST (SGOT) 33 0 - 33 U/L    Alkaline Phosphatase 146 (H) 25 - 100 U/L    Total Bilirubin 0.2 (L) 0.3 - 1.2 mg/dL    eGFR Non African Amer 107 >60 mL/min/1.73    Globulin 2.6 gm/dL    A/G Ratio 1.7 1.5 - 2.5 g/dL    BUN/Creatinine Ratio 25.7 (H) 7.0 - 25.0    Anion Gap 9.0 3.0 - 11.0 mmol/L   C-Peptide   Result Value Ref Range    C-Peptide <0.1 (L) 1.1 - 4.4 ng/mL   POC Glycosylated Hemoglobin (Hb A1C)   Result Value Ref Range    Hemoglobin A1C 9.4 %   POC Glucose Fingerstick   Result Value Ref Range    Glucose 98 70 - 130 mg/dL     Emily was seen today for follow-up and diabetes.    Diagnoses and all orders for this visit:    Uncontrolled type 1 diabetes mellitus without complication  -     POC Glycosylated Hemoglobin (Hb A1C)  -     POC Glucose Fingerstick      Problem List Items Addressed This Visit        Endocrine    Uncontrolled type 1 diabetes mellitus without complication - Primary     Blood sugar and 90 day average sugar reviewed  Results for orders placed or performed in visit on 05/24/18   POC Glycosylated Hemoglobin (Hb A1C)   Result Value Ref Range    Hemoglobin A1C 10.9 %   POC Glucose Fingerstick   Result Value Ref Range    Glucose 197 (A) 70 - 130 mg/dL     She has been out of insulin for 2 weeks  Called pharmacy and it is ready for    Patient notified, samples provided   She does not bring blood sugars with her   Asked her to restart testing and document sugar, insulin dose with meal  1 u per 7 carbs and  1 unit lowers sugar 20 points  Asked her to update eye exam  No neuropathy  Ur alb borderline pos 12/17 (24 hour urine during pregnancy)  Repeat at next ov  F/u 4 weeks with records- grids provided  If intensifying insulin at that point will resubmit paperwork for pump therapy           Relevant Medications    Insulin Glargine (BASAGLAR KWIKPEN) 100 UNIT/ML injection pen    Insulin Lispro (HUMALOG) 100 UNIT/ML solution cartridge    insulin glargine (LANTUS) 100 UNIT/ML injection    Other Relevant Orders    POC Glycosylated Hemoglobin (Hb A1C) (Completed)    POC Glucose Fingerstick (Completed)    Type 1 diabetes mellitus    Relevant Medications    Insulin Glargine (BASAGLAR KWIKPEN) 100 UNIT/ML injection pen    Insulin Lispro (HUMALOG) 100 UNIT/ML solution cartridge    insulin glargine (LANTUS) 100 UNIT/ML injection        Return in about 4 weeks (around 6/21/2018) for Recheck 30 min .    Crystal Reynolds MA  Signed Ne Kerr MD

## 2018-05-29 ENCOUNTER — PRIOR AUTHORIZATION (OUTPATIENT)
Dept: INTERNAL MEDICINE | Facility: CLINIC | Age: 23
End: 2018-05-29

## 2018-06-11 ENCOUNTER — TELEPHONE (OUTPATIENT)
Dept: INTERNAL MEDICINE | Facility: CLINIC | Age: 23
End: 2018-06-11

## 2018-06-11 NOTE — TELEPHONE ENCOUNTER
The PA took too long and the patient is pregnant.  She is a type 1 diabetic and cannot safely go without insulin.  Because of the problems with her insurance company coverage of the preferred insulin during pregnancy (levemir), we were forced to use basaglar and that is what she is using now.  Because of the risk to her and her unborn child, I could not wait for them to decide on coverage.  Ok to stay on basaglar at this point because she has it and is taking it and I do not want to disrupt glucose control during pregnancy by changing her insulin repeatedly.   Thanks, [facility]

## 2018-06-11 NOTE — TELEPHONE ENCOUNTER
[facility] CALLED TO CHECK THE STATUES OF THE PATIENT'S PRIOR AUTH  ON HER LEVEMIR FLEX TOUCH. SHE STATES THAT THE FAXED OVER A PA ON 05/31/2018 AND AGAIN ON 06/04/2018. IN THE PATIENT'S CHART IT STATES THAT THE PA WAS FAXED BACK ON 05/25/2018 AND THAT THE RX WAS CHANGED TO BASAGLAR. LOOKED IN PT MED LIST AND DID NOT SEE THAT IT HAS BEEN SENT IN CAN YOU PLEASE ADVISE?      [facility] CALL BACK NUMBER [phone] AND THE REF# IS [id number]

## 2019-02-15 RX ORDER — SYRINGE AND NEEDLE,INSULIN,1ML 29 G X1/2"
SYRINGE, EMPTY DISPOSABLE MISCELLANEOUS
Refills: 3 | OUTPATIENT
Start: 2019-02-15

## 2019-04-25 ENCOUNTER — OFFICE VISIT (OUTPATIENT)
Dept: ENDOCRINOLOGY | Facility: CLINIC | Age: 24
End: 2019-04-25

## 2019-04-25 VITALS
BODY MASS INDEX: 25.9 KG/M2 | OXYGEN SATURATION: 98 % | DIASTOLIC BLOOD PRESSURE: 64 MMHG | SYSTOLIC BLOOD PRESSURE: 128 MMHG | HEIGHT: 67 IN | WEIGHT: 165 LBS | HEART RATE: 108 BPM

## 2019-04-25 DIAGNOSIS — E10.65 TYPE 1 DIABETES MELLITUS WITH HYPERGLYCEMIA (HCC): Primary | ICD-10-CM

## 2019-04-25 DIAGNOSIS — I10 ESSENTIAL HYPERTENSION: ICD-10-CM

## 2019-04-25 LAB
ALBUMIN SERPL-MCNC: 4.4 G/DL (ref 3.5–5.2)
ALBUMIN UR-MCNC: <1.2 MG/L
ALBUMIN/GLOB SERPL: 1.3 G/DL
ALP SERPL-CCNC: 164 U/L (ref 39–117)
ALT SERPL W P-5'-P-CCNC: 18 U/L (ref 1–33)
ANION GAP SERPL CALCULATED.3IONS-SCNC: 13.4 MMOL/L
AST SERPL-CCNC: 26 U/L (ref 1–32)
BILIRUB SERPL-MCNC: 0.3 MG/DL (ref 0.2–1.2)
BUN BLD-MCNC: 12 MG/DL (ref 6–20)
BUN/CREAT SERPL: 18.5 (ref 7–25)
CALCIUM SPEC-SCNC: 10.3 MG/DL (ref 8.6–10.5)
CHLORIDE SERPL-SCNC: 102 MMOL/L (ref 98–107)
CHOLEST SERPL-MCNC: 271 MG/DL (ref 0–200)
CO2 SERPL-SCNC: 27.6 MMOL/L (ref 22–29)
CREAT BLD-MCNC: 0.65 MG/DL (ref 0.57–1)
CREAT UR-MCNC: 34.7 MG/DL
GFR SERPL CREATININE-BSD FRML MDRD: 114 ML/MIN/1.73
GLOBULIN UR ELPH-MCNC: 3.3 GM/DL
GLUCOSE BLD-MCNC: 68 MG/DL (ref 65–99)
GLUCOSE BLDC GLUCOMTR-MCNC: 78 MG/DL (ref 70–130)
HBA1C MFR BLD: 9.8 %
HDLC SERPL-MCNC: 63 MG/DL (ref 40–60)
LDLC SERPL CALC-MCNC: ABNORMAL MG/DL (ref 0–100)
LDLC/HDLC SERPL: ABNORMAL {RATIO}
MICROALBUMIN/CREAT UR: NORMAL MG/G
POTASSIUM BLD-SCNC: 4.1 MMOL/L (ref 3.5–5.2)
PROT SERPL-MCNC: 7.7 G/DL (ref 6–8.5)
SODIUM BLD-SCNC: 143 MMOL/L (ref 136–145)
T4 FREE SERPL-MCNC: 1.21 NG/DL (ref 0.93–1.7)
TRIGL SERPL-MCNC: 768 MG/DL (ref 0–150)
TSH SERPL DL<=0.05 MIU/L-ACNC: 2.74 MIU/ML (ref 0.27–4.2)
VLDLC SERPL-MCNC: ABNORMAL MG/DL (ref 5–40)

## 2019-04-25 PROCEDURE — 82570 ASSAY OF URINE CREATININE: CPT | Performed by: INTERNAL MEDICINE

## 2019-04-25 PROCEDURE — 83036 HEMOGLOBIN GLYCOSYLATED A1C: CPT | Performed by: INTERNAL MEDICINE

## 2019-04-25 PROCEDURE — 80053 COMPREHEN METABOLIC PANEL: CPT | Performed by: INTERNAL MEDICINE

## 2019-04-25 PROCEDURE — 84439 ASSAY OF FREE THYROXINE: CPT | Performed by: INTERNAL MEDICINE

## 2019-04-25 PROCEDURE — 82947 ASSAY GLUCOSE BLOOD QUANT: CPT | Performed by: INTERNAL MEDICINE

## 2019-04-25 PROCEDURE — 84443 ASSAY THYROID STIM HORMONE: CPT | Performed by: INTERNAL MEDICINE

## 2019-04-25 PROCEDURE — 83721 ASSAY OF BLOOD LIPOPROTEIN: CPT | Performed by: INTERNAL MEDICINE

## 2019-04-25 PROCEDURE — 99214 OFFICE O/P EST MOD 30 MIN: CPT | Performed by: INTERNAL MEDICINE

## 2019-04-25 PROCEDURE — 82043 UR ALBUMIN QUANTITATIVE: CPT | Performed by: INTERNAL MEDICINE

## 2019-04-25 PROCEDURE — 80061 LIPID PANEL: CPT | Performed by: INTERNAL MEDICINE

## 2019-04-25 RX ORDER — BLOOD SUGAR DIAGNOSTIC
STRIP MISCELLANEOUS
Qty: 100 EACH | Refills: 5 | Status: SHIPPED | OUTPATIENT
Start: 2019-04-25

## 2019-04-25 RX ORDER — FLURBIPROFEN SODIUM 0.3 MG/ML
SOLUTION/ DROPS OPHTHALMIC DAILY
Refills: 1 | COMMUNITY
Start: 2019-03-11
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
## 2019-04-26 LAB — ARTICHOKE IGE QN: 126 MG/DL (ref 0–100)

## 2019-04-26 NOTE — ASSESSMENT & PLAN NOTE
Uncontrolled type 1 diabetes currently with symptomatic hypoglycemia treated with juice x 2 and peanut butter crackers in office  Is utd with eye exam  No neuropathy   Due for ur alb check   Repeat blood sugar 58- additional juice provided and she and boyfriend() were going to get food   Discussed if continued low sugars fasting would decrease basal insulin by 10-20% (4-6 units).  Also reviewed pump and sensor options  Glucose meter provided and asked her to bring meter for download at next office visit.    F/u 3-4 months or sooner prn- email provieded

## 2019-05-23 ENCOUNTER — TELEPHONE (OUTPATIENT)
Dept: INTERNAL MEDICINE | Facility: CLINIC | Age: 24
End: 2019-05-23

## 2020-02-05 ENCOUNTER — TRANSCRIBE ORDERS (OUTPATIENT)
Dept: OBSTETRICS AND GYNECOLOGY | Facility: HOSPITAL | Age: 25
End: 2020-02-05

## 2020-02-05 DIAGNOSIS — Z98.891 HISTORY OF C-SECTION: ICD-10-CM

## 2020-02-05 DIAGNOSIS — O24.414 INSULIN CONTROLLED GESTATIONAL DIABETES MELLITUS (GDM) DURING PREGNANCY, ANTEPARTUM: Primary | ICD-10-CM

## 2020-02-05 DIAGNOSIS — Z36.3 ANTENATAL SCREENING FOR MALFORMATION USING ULTRASONICS: ICD-10-CM

## 2020-02-24 ENCOUNTER — OFFICE VISIT (OUTPATIENT)
Dept: OBSTETRICS AND GYNECOLOGY | Facility: HOSPITAL | Age: 25
End: 2020-02-24

## 2020-02-24 ENCOUNTER — LAB REQUISITION (OUTPATIENT)
Dept: LAB | Facility: HOSPITAL | Age: 25
End: 2020-02-24

## 2020-02-24 ENCOUNTER — HOSPITAL ENCOUNTER (OUTPATIENT)
Dept: WOMENS IMAGING | Facility: HOSPITAL | Age: 25
Discharge: HOME | End: 2020-02-24
Admitting: NURSE PRACTITIONER

## 2020-02-24 VITALS — BODY MASS INDEX: 28.82 KG/M2 | WEIGHT: 184 LBS | DIASTOLIC BLOOD PRESSURE: 81 MMHG | SYSTOLIC BLOOD PRESSURE: 133 MMHG

## 2020-02-24 DIAGNOSIS — Z36.3 ANTENATAL SCREENING FOR MALFORMATION USING ULTRASONICS: ICD-10-CM

## 2020-02-24 DIAGNOSIS — Z00.00 ENCOUNTER FOR GENERAL ADULT MEDICAL EXAMINATION WITHOUT ABNORMAL FINDINGS: ICD-10-CM

## 2020-02-24 DIAGNOSIS — Z34.90 PREGNANCY, UNSPECIFIED GESTATIONAL AGE: ICD-10-CM

## 2020-02-24 DIAGNOSIS — Q24.9 CONGENITAL HEART DEFECT: ICD-10-CM

## 2020-02-24 DIAGNOSIS — O24.414 INSULIN CONTROLLED GESTATIONAL DIABETES MELLITUS (GDM) DURING PREGNANCY, ANTEPARTUM: ICD-10-CM

## 2020-02-24 DIAGNOSIS — Z98.891 HISTORY OF C-SECTION: ICD-10-CM

## 2020-02-24 DIAGNOSIS — IMO0001 UNCONTROLLED TYPE 1 DIABETES MELLITUS WITHOUT COMPLICATION: ICD-10-CM

## 2020-02-24 DIAGNOSIS — O36.4XX0 FETAL DEATH AFFECTING MANAGEMENT OF MOTHER, SINGLE OR UNSPECIFIED FETUS: Primary | ICD-10-CM

## 2020-02-24 LAB
BILIRUB BLD-MCNC: NEGATIVE MG/DL
CLARITY, POC: CLEAR
COLOR UR: YELLOW
GLUCOSE UR STRIP-MCNC: ABNORMAL MG/DL
KETONES UR QL: NEGATIVE
LEUKOCYTE EST, POC: NEGATIVE
NITRITE UR-MCNC: NEGATIVE MG/ML
PH UR: 7 [PH] (ref 5–8)
PROT UR STRIP-MCNC: NEGATIVE MG/DL
RBC # UR STRIP: NEGATIVE /UL
SP GR UR: 1.01 (ref 1–1.03)
UROBILINOGEN UR QL: NORMAL

## 2020-02-24 PROCEDURE — 36415 COLL VENOUS BLD VENIPUNCTURE: CPT

## 2020-02-24 PROCEDURE — 76811 OB US DETAILED SNGL FETUS: CPT | Performed by: OBSTETRICS & GYNECOLOGY

## 2020-02-24 PROCEDURE — 99241 PR OFFICE CONSULTATION NEW/ESTAB PATIENT 15 MIN: CPT | Performed by: OBSTETRICS & GYNECOLOGY

## 2020-02-24 PROCEDURE — 76811 OB US DETAILED SNGL FETUS: CPT

## 2020-02-24 RX ORDER — PRENATAL WITH FERROUS FUM AND FOLIC ACID 3080; 920; 120; 400; 22; 1.84; 3; 20; 10; 1; 12; 200; 27; 25; 2 [IU]/1; [IU]/1; MG/1; [IU]/1; MG/1; MG/1; MG/1; MG/1; MG/1; MG/1; UG/1; MG/1; MG/1; MG/1; MG/1
1 TABLET ORAL DAILY
COMMUNITY
End: 2020-02-24

## 2020-02-24 RX ORDER — INSULIN ASPART 100 [IU]/ML
INJECTION, SOLUTION INTRAVENOUS; SUBCUTANEOUS
COMMUNITY
Start: 2020-01-28

## 2020-02-24 RX ORDER — ASPIRIN 81 MG/1
81 TABLET ORAL DAILY
COMMUNITY

## 2020-02-24 RX ORDER — PROCHLORPERAZINE 25 MG/1
SUPPOSITORY RECTAL
COMMUNITY
Start: 2020-02-04

## 2020-02-24 NOTE — PROGRESS NOTES
Pt denies lof, vag bleeding or cramping. Denies genetic testing. Denies ha, blurred vision or epigastric pain. Sees endocrinologist, Dr Draper in [place], for DM. Checks glucose throughout day with Dexcom. Fasting range: . 2hrs PP: 150-200. Longer than 1 yr since last eye exam.

## 2020-02-24 NOTE — PROGRESS NOTES
Documentation of the ultasound findings, images, and interpretations as well as consultation note will be available in the patient's Viewpoint report located in the Chart Review Imaging tab in [employer].

## 2020-02-28 ENCOUNTER — TELEPHONE (OUTPATIENT)
Dept: WOMENS IMAGING | Facility: HOSPITAL | Age: 25
End: 2020-02-28

## 2020-02-28 NOTE — TELEPHONE ENCOUNTER
Attempted to reach patient with normal NIPS results. No answer/no voicemail. Results forwarded to her OB provider.     Patient returned my call. Informed her of normal NIPS results. She v/u.   
8

## 2020-03-23 ENCOUNTER — OFFICE VISIT (OUTPATIENT)
Dept: OBSTETRICS AND GYNECOLOGY | Facility: HOSPITAL | Age: 25
End: 2020-03-23

## 2020-03-23 ENCOUNTER — HOSPITAL ENCOUNTER (OUTPATIENT)
Dept: WOMENS IMAGING | Facility: HOSPITAL | Age: 25
Discharge: HOME | End: 2020-03-23
Admitting: OBSTETRICS & GYNECOLOGY

## 2020-03-23 VITALS — SYSTOLIC BLOOD PRESSURE: 137 MMHG | BODY MASS INDEX: 30.2 KG/M2 | WEIGHT: 192.8 LBS | DIASTOLIC BLOOD PRESSURE: 72 MMHG

## 2020-03-23 DIAGNOSIS — O36.4XX0 FETAL DEATH AFFECTING MANAGEMENT OF MOTHER, SINGLE OR UNSPECIFIED FETUS: ICD-10-CM

## 2020-03-23 DIAGNOSIS — Z34.90 PREGNANCY, UNSPECIFIED GESTATIONAL AGE: ICD-10-CM

## 2020-03-23 DIAGNOSIS — IMO0001 UNCONTROLLED TYPE 1 DIABETES MELLITUS WITHOUT COMPLICATION: ICD-10-CM

## 2020-03-23 DIAGNOSIS — O35.BXX0 CONGENITAL HEART DISEASE OF FETUS AFFECTING ANTEPARTUM CARE OF MOTHER, SINGLE OR UNSPECIFIED FETUS: ICD-10-CM

## 2020-03-23 DIAGNOSIS — Q24.9 CONGENITAL HEART DEFECT: ICD-10-CM

## 2020-03-23 DIAGNOSIS — O34.219 PREVIOUS CESAREAN DELIVERY AFFECTING PREGNANCY: ICD-10-CM

## 2020-03-23 DIAGNOSIS — O24.919 DIABETES MELLITUS DURING PREGNANCY, ANTEPARTUM, UNSPECIFIED DIABETES MELLITUS TYPE: Primary | ICD-10-CM

## 2020-03-23 PROBLEM — O21.9 NAUSEA AND VOMITING IN PREGNANCY: Status: RESOLVED | Noted: 2017-12-14 | Resolved: 2020-03-23

## 2020-03-23 PROBLEM — O21.9 NAUSEA AND VOMITING DURING PREGNANCY: Status: RESOLVED | Noted: 2017-12-15 | Resolved: 2020-03-23

## 2020-03-23 PROBLEM — Z36.89 NON-STRESS TEST REACTIVE: Status: RESOLVED | Noted: 2017-11-11 | Resolved: 2020-03-23

## 2020-03-23 PROCEDURE — 76816 OB US FOLLOW-UP PER FETUS: CPT | Performed by: OBSTETRICS & GYNECOLOGY

## 2020-03-23 PROCEDURE — 76816 OB US FOLLOW-UP PER FETUS: CPT

## 2020-04-20 ENCOUNTER — HOSPITAL ENCOUNTER (OUTPATIENT)
Dept: WOMENS IMAGING | Facility: HOSPITAL | Age: 25
Discharge: HOME | End: 2020-04-20
Admitting: OBSTETRICS & GYNECOLOGY

## 2020-04-20 ENCOUNTER — OFFICE VISIT (OUTPATIENT)
Dept: OBSTETRICS AND GYNECOLOGY | Facility: HOSPITAL | Age: 25
End: 2020-04-20

## 2020-04-20 VITALS — SYSTOLIC BLOOD PRESSURE: 130 MMHG | WEIGHT: 194 LBS | DIASTOLIC BLOOD PRESSURE: 83 MMHG | BODY MASS INDEX: 30.38 KG/M2

## 2020-04-20 DIAGNOSIS — O35.BXX0 CONGENITAL HEART DISEASE OF FETUS AFFECTING ANTEPARTUM CARE OF MOTHER, SINGLE OR UNSPECIFIED FETUS: ICD-10-CM

## 2020-04-20 DIAGNOSIS — O34.219 PREVIOUS CESAREAN DELIVERY AFFECTING PREGNANCY: ICD-10-CM

## 2020-04-20 DIAGNOSIS — O35.BXX0 CONGENITAL HEART DISEASE OF FETUS AFFECTING ANTEPARTUM CARE OF MOTHER, SINGLE OR UNSPECIFIED FETUS: Primary | ICD-10-CM

## 2020-04-20 DIAGNOSIS — Z3A.31 31 WEEKS GESTATION OF PREGNANCY: ICD-10-CM

## 2020-04-20 DIAGNOSIS — O24.919 DIABETES MELLITUS DURING PREGNANCY, ANTEPARTUM, UNSPECIFIED DIABETES MELLITUS TYPE: ICD-10-CM

## 2020-04-20 DIAGNOSIS — O24.013 PRE-EXISTING TYPE 1 DIABETES MELLITUS DURING PREGNANCY IN THIRD TRIMESTER: ICD-10-CM

## 2020-04-20 LAB
BILIRUB BLD-MCNC: NEGATIVE MG/DL
CLARITY, POC: CLEAR
COLOR UR: YELLOW
GLUCOSE UR STRIP-MCNC: ABNORMAL MG/DL
KETONES UR QL: ABNORMAL
LEUKOCYTE EST, POC: ABNORMAL
NITRITE UR-MCNC: NEGATIVE MG/ML
PH UR: 7 [PH] (ref 5–8)
PROT UR STRIP-MCNC: NEGATIVE MG/DL
RBC # UR STRIP: NEGATIVE /UL
SP GR UR: 1.01 (ref 1–1.03)
UROBILINOGEN UR QL: NORMAL

## 2020-04-20 PROCEDURE — 76816 OB US FOLLOW-UP PER FETUS: CPT | Performed by: OBSTETRICS & GYNECOLOGY

## 2020-04-20 PROCEDURE — 76816 OB US FOLLOW-UP PER FETUS: CPT

## 2020-04-20 RX ORDER — BLOOD-GLUCOSE METER
EACH MISCELLANEOUS SEE ADMIN INSTRUCTIONS
COMMUNITY
Start: 2020-03-13

## 2020-04-20 RX ORDER — LANCETS 33 GAUGE
EACH MISCELLANEOUS
COMMUNITY
Start: 2020-03-11

## 2020-04-20 RX ORDER — INSULIN GLARGINE 100 [IU]/ML
45 INJECTION, SOLUTION SUBCUTANEOUS EVERY 24 HOURS
COMMUNITY

## 2020-04-20 NOTE — PROGRESS NOTES
Pt denies lof, vag bleeding or cramping. Scheduled to go back to [facility] for appt 4/27. Uses dexcom for blood glucose monitoring. Fasting range: 200's. 2hrs PP: 150-200's. Dr Draper in [place] manages her IDDM. States she gives glucose readings to him weekly. Waiting to get insulin pump. Denies ha, blurred vision or epigastric pain.

## 2020-04-20 NOTE — PROGRESS NOTES
Documentation of the ultasound findings, images, and interpretations will be available in the patient's Viewpoint report which is located in the imaging tab in chart review.

## 2021-08-16 NOTE — H&P
Chief complaint   Chief Complaint   Patient presents with   • Abdominal Pain   • Headache       History of Present Illness: Patient is a 22 y.o. female who was admitted yesterday with IUP at 30w5d weeks gestation secondary to abdominal pain. G 3, P 0110.       Past Medical History:   Diagnosis Date   • Anxiety 2014    took meds briefly   • Diabetes mellitus 2000    Type 1   • Gestational hypertension 2016   • Hypertension 2016    With pregnancy (2017-pt denies previously recorded dx of CHTN)   • Kidney failure 2016    R/T diabetes (8/10/2017- patient denies)    • Polyhydramnios    History of IUFD    Blood Type: O -  Fetal Gender: Female    Past Surgical History:   Procedure Laterality Date   • FINGER/THUMB ARTHROPLASTY Left     age 9 yr   • FINGER/THUMB CLOSED REDUCTION Left 2004   • FINGER/THUMB CLOSED REDUCTION       Family History   Problem Relation Age of Onset   • Hypertension Father    • Cancer Maternal Grandfather    • Cancer Paternal Grandmother    • Arthritis Paternal Grandfather    • Heart attack Paternal Grandfather    • Hypertension Paternal Grandfather      Social History   Substance Use Topics   • Smoking status: Never Smoker   • Smokeless tobacco: Never Used   • Alcohol use No     Prescriptions Prior to Admission   Medication Sig Dispense Refill Last Dose   • aspirin 81 MG EC tablet Take 81 mg by mouth Daily.   11/7/2017 at am   • insulin aspart (novoLOG FLEXPEN) 100 UNIT/ML solution pen-injector sc pen Inject 30 Units under the skin 3 (Three) Times a Day With Meals. (Patient taking differently: Inject 15-25 Units under the skin 4 (Four) Times a Day Before Meals & at Bedtime As Needed (bs). Sliding scale 1unit per 6 grams of carbs) 30 mL 5 11/7/2017 at 1430   • insulin detemir (LEVEMIR) 100 UNIT/ML injection Inject 60 Units under the skin Daily. (Patient taking differently: Inject 60 Units under the skin Every Morning.) 30 mL 5 11/7/2017 at am   • Prenatal Vit-Fe Fumarate-FA (PRENATAL VITAMIN  Pt called back please call pt    27-0.8) 27-0.8 MG tablet tablet Take 1 tablet by mouth Daily.   11/7/2017 at am     Allergies:  Review of patient's allergies indicates no known allergies.      Vital Signs  Temp:  [97.8 °F (36.6 °C)] 97.8 °F (36.6 °C)  Heart Rate:  [] 96  Resp:  [16-19] 16  BP: (107-129)/(67-82) 129/80    Radiology  Imaging Results (last 24 hours)     ** No results found for the last 24 hours. **          Labs  Lab Results (last 24 hours)     Procedure Component Value Units Date/Time    CBC & Differential [[id number]] Collected:  11/07/17 1917    Specimen:  Blood Updated:  11/07/17 1931    Narrative:       The following orders were created for panel order CBC & Differential.  Procedure                               Abnormality         Status                     ---------                               -----------         ------                     CBC Auto Differential[[id number]]        Abnormal            Final result                 Please view results for these tests on the individual orders.    CBC Auto Differential [[id number]]  (Abnormal) Collected:  11/07/17 1917    Specimen:  Blood Updated:  11/07/17 1931     WBC 11.04 10*3/mm3      RBC 4.29 10*6/mm3      Hemoglobin 11.9 (L) g/dL      Hematocrit 37.0 %      MCV 86.2 fL      MCH 27.7 pg      MCHC 32.2 (L) g/dL      RDW 13.8 %      RDW-SD 42.4 fl      MPV 11.6 (H) fL      Platelets 247 10*3/mm3      Neutrophil % 81.6 (H) %      Lymphocyte % 10.5 (L) %      Monocyte % 6.8 %      Eosinophil % 0.2 %      Basophil % 0.1 %      Immature Grans % 0.8 (H) %      Neutrophils, Absolute 9.01 (H) 10*3/mm3      Lymphocytes, Absolute 1.16 10*3/mm3      Monocytes, Absolute 0.75 10*3/mm3      Eosinophils, Absolute 0.02 10*3/mm3      Basophils, Absolute 0.01 10*3/mm3      Immature Grans, Absolute 0.09 (H) 10*3/mm3     Uric Acid [[id number]]  (Normal) Collected:  11/07/17 1917    Specimen:  Blood Updated:  11/07/17 1946     Uric Acid 4.6 mg/dL     Comprehensive Metabolic Panel [[id number]]   (Abnormal) Collected:  11/07/17 1917    Specimen:  Blood Updated:  11/07/17 1948     Glucose 127 (H) mg/dL      BUN 9 mg/dL      Creatinine 0.58 mg/dL      Sodium 133 (L) mmol/L      Potassium 3.6 mmol/L      Chloride 106 mmol/L      CO2 15.9 (L) mmol/L      Calcium 8.9 mg/dL      Total Protein 6.7 g/dL      Albumin 3.60 g/dL      ALT (SGPT) 17 U/L      AST (SGOT) 21 U/L      Alkaline Phosphatase 81 U/L       Note New Reference Ranges        Total Bilirubin 0.5 mg/dL      eGFR Non African Amer 133 mL/min/1.73      Globulin 3.1 gm/dL      A/G Ratio 1.2 (L) g/dL      BUN/Creatinine Ratio 15.5     Anion Gap 11.1 mmol/L     Osmolality, Calculated [[id number]]  (Abnormal) Collected:  11/07/17 1917    Specimen:  Blood Updated:  11/07/17 1948     Osmolality Calc 266.6 (L) mOsm/kg     Urine Culture - Urine, Urine, Clean Catch [[id number]] Collected:  11/07/17 1942    Specimen:  Urine from Urine, Catheter Updated:  11/07/17 1955    Urinalysis With / Culture If Indicated - Urine, Catheter [[id number]]  (Abnormal) Collected:  11/07/17 1942    Specimen:  Urine from Urine, Catheter Updated:  11/07/17 1957     Color, UA Yellow     Appearance, UA Clear     pH, UA 5.5     Specific Gravity, UA >1.030 (H)     Glucose, UA >=1000 mg/dL (3+) (A)     Ketones, UA >=160 mg/dL (4+) (A)     Bilirubin, UA Negative     Blood, UA Negative     Protein, UA 30 mg/dL (1+) (A)     Leuk Esterase, UA Negative     Nitrite, UA Negative     Urobilinogen, UA 0.2 E.U./dL    Urinalysis, Microscopic Only - Urine, Clean Catch [[id number]] Collected:  11/07/17 1942    Specimen:  Urine from Urine, Catheter Updated:  11/07/17 2019     RBC, UA 0-2 /HPF      WBC, UA 0-2 /HPF      Bacteria, UA None Seen /HPF      Squamous Epithelial Cells, UA 0-2 /HPF      Hyaline Casts, UA None Seen /LPF      Methodology Manual Light Microscopy            Review of Systems    The following systems were reviewed and negative;  ENT, respiratory, cardiovascular, gastrointestinal,  genitourinary, breast, endocrine and allergies / immunologic.      Physical Exam:      General Appearance:    Alert, cooperative, in no acute distress   Head:    Normocephalic, without obvious abnormality, atraumatic   Eyes:            Lids and lashes normal, conjunctivae and sclerae normal, no   icterus, no pallor, corneas clear, PERRLA   Ears:    Ears appear intact with no abnormalities noted   Throat:   No oral lesions, no thrush, oral mucosa moist   Neck:   No adenopathy, supple, trachea midline, no thyromegaly, no     carotid bruit, no JVD   Back:     No kyphosis present, no scoliosis present, no skin lesions,       erythema or scars, no tenderness to percussion or                   palpation,   range of motion normal   Lungs:     Clear to auscultation,respirations regular, even and                   unlabored    Heart:    Regular rhythm and normal rate, normal S1 and S2, no            murmur, no gallop, no rub, no click   Breast Exam:    Deferred   Abdomen:     Normal bowel sounds, no masses, no organomegaly, soft        non-tender, non-distended, no guarding, no rebound                 tenderness   Genitalia:    Dilated .5 cm, No Change   Extremities:   Moves all extremities well, no edema, no cyanosis, no              redness   Pulses:   Pulses palpable and equal bilaterally   Skin:   No bleeding, bruising or rash   Lymph nodes:   No palpable adenopathy   Neurologic:   Cranial nerves 2 - 12 grossly intact, sensation intact, DTR        present and equal bilaterally         Assessment: Patient is a 22 y.o. female who presents with IUP at 30w6d weeks gestation. G 3, P 0110.  Chief Complaint   Patient presents with   • Abdominal Pain   • Headache       Plan of Care:  Proceed with NST & BPP today. If normal will discharge to home.       Jose Noel III, MD  11/08/17  8:44 AM

## (undated) DEVICE — BG RESUSCITATOR INFANT BABYBLUE2

## (undated) DEVICE — HYDROGEL COATED LATEX URINE METER FOLEY TRAY,16 FR/CH (5.3 MM), 5 ML CATHETER PRE-CONNECTED TO 2000 ML DRAINAGE BAG WITH NEEDLE SAMPLING: Brand: DOVER

## (undated) DEVICE — CATH IV ANGIOCATH/AUTOGARD 18G 1.75IN GRN

## (undated) DEVICE — ADHS SKIN DERMABOND TOPICAL HI VISC

## (undated) DEVICE — PK C/SECT 70